# Patient Record
Sex: FEMALE | Race: BLACK OR AFRICAN AMERICAN | NOT HISPANIC OR LATINO | Employment: FULL TIME | URBAN - METROPOLITAN AREA
[De-identification: names, ages, dates, MRNs, and addresses within clinical notes are randomized per-mention and may not be internally consistent; named-entity substitution may affect disease eponyms.]

---

## 2019-08-19 PROBLEM — Z34.90 PREGNANCY: Status: ACTIVE | Noted: 2019-08-19

## 2020-02-13 PROBLEM — N93.9 VAGINAL BLEEDING: Status: ACTIVE | Noted: 2020-02-13

## 2020-03-28 PROBLEM — N85.8 ALTERATION IN COMFORT ASSOCIATED WITH UTERINE CONTRACTIONS: Status: ACTIVE | Noted: 2020-03-28

## 2020-09-03 ENCOUNTER — TELEPHONE (OUTPATIENT)
Dept: PHARMACY | Facility: CLINIC | Age: 30
End: 2020-09-03

## 2020-09-03 NOTE — TELEPHONE ENCOUNTER
LVM for callback to inform patient that [facility] received prescription for Jadenu and benefits investigation is required.  [facility] will be back in touch once insurance determination is received.

## 2020-09-09 NOTE — TELEPHONE ENCOUNTER
DOCUMENTATION ONLY:  Deferasirox 360 mg and 90 mg is covered under the patients medicaid plan without authorization for $0

## 2020-09-09 NOTE — TELEPHONE ENCOUNTER
Call Attempt #1.  M for Jerri initial consult.  Copay $0.00 for both deferasirox 90mg and deferasirox 360mg.

## 2020-09-09 NOTE — TELEPHONE ENCOUNTER
Initial Jadenu consult completed on 2020.  Jadenu will be shipped on 9/10/2020 to arrive at patient's home on 2020 via [employer]. $0.00 copay. Address confirmed. Patient plans to start Jadenu on 2020. Confirmed 2 patient identifiers - name and . Therapy Appropriate.    Patient was counseled on the administration directions:  -Take 1 - 360 mg tablet WITH 1 - 90mg tablet by mouth daily  -Take on an empty stomach or with a light meal  -Store at room temperature, in a cool, dry, place.  Keep out of reach of children/pets.    Patient was counseled on the following possible side effects, which include, but are not limited to:  Diarrhea, upset stomach, stomach pain/cramping, rash.  Patient was given hydrocortisone cream 1% to use for rash relief.  -Contact MD if signs of allergic reaction, infection, kidney/liver problems, bleeding, changes in hearing or eyesight.     DDIs:  Medication list reviewed and potential DDIs addressed.     Patient verbalized understanding. Patient does not have any further questions or concerns at this time.  Patient plans to start Jadenu on 2020. Consultation included: indication; goals of treatment; administration; storage and handling; side effects; how to handle side effects; the importance of compliance; how to handle missed doses; the importance of laboratory monitoring; the importance of keeping all follow up appointments.  Patient understands to report any medication changes to [facility] and provider. All questions answered and addressed to patients satisfaction. I will f/u with her in 1 week from start, [facility] to contact patient in 3 weeks for refills.

## 2020-09-18 NOTE — TELEPHONE ENCOUNTER
Patient just started Jadenu about 3- 4 days ago. Agreeable to completion of 7 day touchbase after being on medication for at least 7 days. Will call patient on 9/22.

## 2020-09-22 ENCOUNTER — TELEPHONE (OUTPATIENT)
Dept: PHARMACY | Facility: CLINIC | Age: 30
End: 2020-09-22

## 2020-09-22 NOTE — TELEPHONE ENCOUNTER
Call Attempt 1: [facility] reach [facility]. LVM requesting call back to discuss how she is doing on her medication.

## 2020-09-29 NOTE — TELEPHONE ENCOUNTER
Start date for Jerri: 9/15/20  Dosing, how taking: Take deferasirox (Jadeni) 1 - 360mg tablet WITH 1 - 90mg tablet once daily at the same time each day. Takes on an empty stomach around noon.   Storage: room temperature  Side effects: Patient reports some nausea. Denies any vomiting. Will send message to MD to get script for anti-nausea medication. Appetite slightly decreased. Still having about 1 meal a day.

## 2020-10-09 ENCOUNTER — TELEPHONE (OUTPATIENT)
Dept: PHARMACY | Facility: CLINIC | Age: 30
End: 2020-10-09

## 2020-10-09 NOTE — TELEPHONE ENCOUNTER
Jadenu refill confirmed. [facility] will ship Jadenu (360mg and 90mg) on 10/13 via [employer] for delivery on 10/14. Copay $0.00. Confirmed 2 patient identifiers - name and .     Patient reports taking Jadenu 360mg and 90mg tablets (total 450mg dose) once daily. She has approximately 1 week of medication remaining. Patient reports that nausea has mostly subsided. She picked up the Zofran from her local [facility] pharmacy. She reports she has not needed the medication, but will keep it on hand if needed. She reports experiencing mild headaches. She is taking Tylenol when needed for relief and resting when appropriate. No missed doses, no new medications, no new allergies or health conditions reported at this time. Allergies reviewed and medication reconciliation complete (reviewed and documented in [facility]). Patient counseled on importance of maintaining adherence and keeping lab appointments which were scheduled. All questions answered and addressed to patients satisfaction. Advised to call [facility] and provider if any issues arise.  Pt verbalized understanding.

## 2020-11-07 ENCOUNTER — PATIENT MESSAGE (OUTPATIENT)
Dept: PHARMACY | Facility: CLINIC | Age: 30
End: 2020-11-07

## 2020-11-09 ENCOUNTER — TELEPHONE (OUTPATIENT)
Dept: PHARMACY | Facility: CLINIC | Age: 30
End: 2020-11-09

## 2020-12-07 ENCOUNTER — PATIENT MESSAGE (OUTPATIENT)
Dept: PHARMACY | Facility: CLINIC | Age: 30
End: 2020-12-07

## 2020-12-09 ENCOUNTER — SPECIALTY PHARMACY (OUTPATIENT)
Dept: PHARMACY | Facility: CLINIC | Age: 30
End: 2020-12-09

## 2020-12-09 DIAGNOSIS — E83.19 IRON OVERLOAD: Primary | ICD-10-CM

## 2020-12-09 NOTE — TELEPHONE ENCOUNTER
"At follow up and refill call today, patient reported that she is having "frequent" headaches due to the Jadenu. The side effect of headaches from the PI has 'frequency not defined.' Rates the pain a 5/10. She does not normally take any medication for her headaches but uses Excedrin as needed and this did help. She stopped taking the Jadenu about 2.5 weeks ago. She does not seem willing to restart due to the headaches. I explained the importance of the medication with regard to her ferritin level. Will message MD to see if there is anything they advise or would prescribe for her headaches for her to continue on therapy.   "

## 2020-12-16 NOTE — TELEPHONE ENCOUNTER
Marivel Pepper RN notified [facility] that patient has missed the last couple of appt scheduled with Dr. Holguin. At last call, patient has stopped her Jadenu and did not seem willing to restart due to side effects of headaches. Asked if we should close out patient's Jadenu in our system at this time or continue to follow up.

## 2020-12-22 NOTE — TELEPHONE ENCOUNTER
Closing out due to MD office not being able to reach patient for follow up and patient self-discontinuing her medications. MD office is aware to reach out if patient decides to restart therapy again in the future.

## 2020-12-22 NOTE — TELEPHONE ENCOUNTER
Provider has still not been able to reach patient for follow up concerning Paulu d/c. Messaged office to see if appropriate for [facility] to close out at this time.

## 2021-01-13 ENCOUNTER — SPECIALTY PHARMACY (OUTPATIENT)
Dept: PHARMACY | Facility: CLINIC | Age: 31
End: 2021-01-13

## 2021-01-13 DIAGNOSIS — E83.19 IRON OVERLOAD: Primary | ICD-10-CM

## 2021-02-17 ENCOUNTER — PATIENT MESSAGE (OUTPATIENT)
Dept: PHARMACY | Facility: CLINIC | Age: 31
End: 2021-02-17

## 2021-02-26 ENCOUNTER — HOSPITAL ENCOUNTER (EMERGENCY)
Facility: HOSPITAL | Age: 31
Discharge: LEFT BEFORE DISCHARGE | End: 2021-02-26
Attending: EMERGENCY MEDICINE
Payer: MEDICAID

## 2021-02-26 VITALS
WEIGHT: 150 LBS | TEMPERATURE: 99 F | OXYGEN SATURATION: 96 % | BODY MASS INDEX: 23.54 KG/M2 | SYSTOLIC BLOOD PRESSURE: 98 MMHG | HEART RATE: 92 BPM | DIASTOLIC BLOOD PRESSURE: 53 MMHG | RESPIRATION RATE: 18 BRPM | HEIGHT: 67 IN

## 2021-02-26 DIAGNOSIS — R50.9 FEVER, UNSPECIFIED FEVER CAUSE: Primary | ICD-10-CM

## 2021-02-26 LAB
ALBUMIN SERPL BCP-MCNC: 4.7 G/DL (ref 3.5–5.2)
ALP SERPL-CCNC: 62 U/L (ref 55–135)
ALT SERPL W/O P-5'-P-CCNC: 32 U/L (ref 10–44)
ANION GAP SERPL CALC-SCNC: 7 MMOL/L (ref 8–16)
ANISOCYTOSIS BLD QL SMEAR: SLIGHT
AST SERPL-CCNC: 42 U/L (ref 10–40)
B-HCG UR QL: NEGATIVE
BACTERIA #/AREA URNS HPF: ABNORMAL /HPF
BASOPHILS # BLD AUTO: 0.02 K/UL (ref 0–0.2)
BASOPHILS NFR BLD: 0.2 % (ref 0–1.9)
BILIRUB SERPL-MCNC: 6.1 MG/DL (ref 0.1–1)
BILIRUB UR QL STRIP: NEGATIVE
BUN SERPL-MCNC: 7 MG/DL (ref 6–20)
CALCIUM SERPL-MCNC: 8.7 MG/DL (ref 8.7–10.5)
CHLORIDE SERPL-SCNC: 108 MMOL/L (ref 95–110)
CLARITY UR: ABNORMAL
CO2 SERPL-SCNC: 25 MMOL/L (ref 23–29)
COLOR UR: ABNORMAL
CREAT SERPL-MCNC: 0.6 MG/DL (ref 0.5–1.4)
CTP QC/QA: YES
CTP QC/QA: YES
DIFFERENTIAL METHOD: ABNORMAL
EOSINOPHIL # BLD AUTO: 0 K/UL (ref 0–0.5)
EOSINOPHIL NFR BLD: 0.1 % (ref 0–8)
ERYTHROCYTE [DISTWIDTH] IN BLOOD BY AUTOMATED COUNT: 18.3 % (ref 11.5–14.5)
EST. GFR  (AFRICAN AMERICAN): >60 ML/MIN/1.73 M^2
EST. GFR  (NON AFRICAN AMERICAN): >60 ML/MIN/1.73 M^2
GLUCOSE SERPL-MCNC: 98 MG/DL (ref 70–110)
GLUCOSE UR QL STRIP: NEGATIVE
HCT VFR BLD AUTO: 24.8 % (ref 37–48.5)
HGB BLD-MCNC: 8.7 G/DL (ref 12–16)
HGB UR QL STRIP: NEGATIVE
HYALINE CASTS #/AREA URNS LPF: 2 /LPF
HYPOCHROMIA BLD QL SMEAR: ABNORMAL
IMM GRANULOCYTES # BLD AUTO: 0.09 K/UL (ref 0–0.04)
IMM GRANULOCYTES NFR BLD AUTO: 0.8 % (ref 0–0.5)
KETONES UR QL STRIP: NEGATIVE
LACTATE SERPL-SCNC: 1.1 MMOL/L (ref 0.5–2.2)
LEUKOCYTE ESTERASE UR QL STRIP: ABNORMAL
LYMPHOCYTES # BLD AUTO: 1.7 K/UL (ref 1–4.8)
LYMPHOCYTES NFR BLD: 14.3 % (ref 18–48)
MCH RBC QN AUTO: 29.8 PG (ref 27–31)
MCHC RBC AUTO-ENTMCNC: 35.1 G/DL (ref 32–36)
MCV RBC AUTO: 85 FL (ref 82–98)
MICROSCOPIC COMMENT: ABNORMAL
MONOCYTES # BLD AUTO: 1.1 K/UL (ref 0.3–1)
MONOCYTES NFR BLD: 9.5 % (ref 4–15)
NEUTROPHILS # BLD AUTO: 8.8 K/UL (ref 1.8–7.7)
NEUTROPHILS NFR BLD: 75.1 % (ref 38–73)
NITRITE UR QL STRIP: NEGATIVE
NRBC BLD-RTO: 1 /100 WBC
PH UR STRIP: 6 [PH] (ref 5–8)
PLATELET # BLD AUTO: 542 K/UL (ref 150–350)
PMV BLD AUTO: 9.8 FL (ref 9.2–12.9)
POC MOLECULAR INFLUENZA A AGN: NEGATIVE
POC MOLECULAR INFLUENZA B AGN: NEGATIVE
POIKILOCYTOSIS BLD QL SMEAR: SLIGHT
POLYCHROMASIA BLD QL SMEAR: ABNORMAL
POTASSIUM SERPL-SCNC: 3.2 MMOL/L (ref 3.5–5.1)
PROT SERPL-MCNC: 8.9 G/DL (ref 6–8.4)
PROT UR QL STRIP: ABNORMAL
RBC # BLD AUTO: 2.92 M/UL (ref 4–5.4)
RBC #/AREA URNS HPF: 1 /HPF (ref 0–4)
RETICS/RBC NFR AUTO: 16.8 % (ref 0.5–2.5)
SARS-COV-2 RDRP RESP QL NAA+PROBE: NEGATIVE
SICKLE CELLS BLD QL SMEAR: ABNORMAL
SODIUM SERPL-SCNC: 140 MMOL/L (ref 136–145)
SP GR UR STRIP: 1.01 (ref 1–1.03)
SQUAMOUS #/AREA URNS HPF: 14 /HPF
TARGETS BLD QL SMEAR: ABNORMAL
URN SPEC COLLECT METH UR: ABNORMAL
UROBILINOGEN UR STRIP-ACNC: 1 EU/DL
WBC # BLD AUTO: 11.7 K/UL (ref 3.9–12.7)
WBC #/AREA URNS HPF: 6 /HPF (ref 0–5)

## 2021-02-26 PROCEDURE — 85025 COMPLETE CBC W/AUTO DIFF WBC: CPT

## 2021-02-26 PROCEDURE — 81000 URINALYSIS NONAUTO W/SCOPE: CPT

## 2021-02-26 PROCEDURE — 25000003 PHARM REV CODE 250: Performed by: EMERGENCY MEDICINE

## 2021-02-26 PROCEDURE — 87040 BLOOD CULTURE FOR BACTERIA: CPT

## 2021-02-26 PROCEDURE — 63600175 PHARM REV CODE 636 W HCPCS: Performed by: EMERGENCY MEDICINE

## 2021-02-26 PROCEDURE — 81025 URINE PREGNANCY TEST: CPT

## 2021-02-26 PROCEDURE — 99284 EMERGENCY DEPT VISIT MOD MDM: CPT | Mod: 25

## 2021-02-26 PROCEDURE — 85045 AUTOMATED RETICULOCYTE COUNT: CPT

## 2021-02-26 PROCEDURE — U0002 COVID-19 LAB TEST NON-CDC: HCPCS | Performed by: EMERGENCY MEDICINE

## 2021-02-26 PROCEDURE — 36415 COLL VENOUS BLD VENIPUNCTURE: CPT

## 2021-02-26 PROCEDURE — 96365 THER/PROPH/DIAG IV INF INIT: CPT

## 2021-02-26 PROCEDURE — 80053 COMPREHEN METABOLIC PANEL: CPT

## 2021-02-26 PROCEDURE — 96372 THER/PROPH/DIAG INJ SC/IM: CPT | Mod: 59

## 2021-02-26 PROCEDURE — 83605 ASSAY OF LACTIC ACID: CPT

## 2021-02-26 RX ORDER — DEXAMETHASONE SODIUM PHOSPHATE 4 MG/ML
8 INJECTION, SOLUTION INTRA-ARTICULAR; INTRALESIONAL; INTRAMUSCULAR; INTRAVENOUS; SOFT TISSUE
Status: COMPLETED | OUTPATIENT
Start: 2021-02-26 | End: 2021-02-26

## 2021-02-26 RX ORDER — ACETAMINOPHEN 500 MG
1000 TABLET ORAL
Status: COMPLETED | OUTPATIENT
Start: 2021-02-26 | End: 2021-02-26

## 2021-02-26 RX ADMIN — ACETAMINOPHEN 1000 MG: 500 TABLET ORAL at 03:02

## 2021-02-26 RX ADMIN — DEXAMETHASONE SODIUM PHOSPHATE 8 MG: 4 INJECTION, SOLUTION INTRA-ARTICULAR; INTRALESIONAL; INTRAMUSCULAR; INTRAVENOUS; SOFT TISSUE at 03:02

## 2021-02-26 RX ADMIN — CEFTRIAXONE SODIUM 2 G: 2 INJECTION, POWDER, FOR SOLUTION INTRAMUSCULAR; INTRAVENOUS at 03:02

## 2021-03-04 LAB
BACTERIA BLD CULT: NORMAL
BACTERIA BLD CULT: NORMAL

## 2021-03-05 ENCOUNTER — SPECIALTY PHARMACY (OUTPATIENT)
Dept: PHARMACY | Facility: CLINIC | Age: 31
End: 2021-03-05

## 2021-03-05 DIAGNOSIS — E83.19 IRON OVERLOAD: Primary | ICD-10-CM

## 2021-03-29 ENCOUNTER — PATIENT MESSAGE (OUTPATIENT)
Dept: PHARMACY | Facility: CLINIC | Age: 31
End: 2021-03-29

## 2021-04-07 ENCOUNTER — SPECIALTY PHARMACY (OUTPATIENT)
Dept: PHARMACY | Facility: CLINIC | Age: 31
End: 2021-04-07

## 2021-04-07 DIAGNOSIS — E83.19 IRON OVERLOAD: Primary | ICD-10-CM

## 2021-04-15 ENCOUNTER — SPECIALTY PHARMACY (OUTPATIENT)
Dept: PHARMACY | Facility: CLINIC | Age: 31
End: 2021-04-15

## 2021-05-11 ENCOUNTER — SPECIALTY PHARMACY (OUTPATIENT)
Dept: PHARMACY | Facility: CLINIC | Age: 31
End: 2021-05-11

## 2021-05-11 DIAGNOSIS — E83.19 IRON OVERLOAD: Primary | ICD-10-CM

## 2021-06-08 ENCOUNTER — PATIENT MESSAGE (OUTPATIENT)
Dept: PHARMACY | Facility: CLINIC | Age: 31
End: 2021-06-08

## 2021-06-11 ENCOUNTER — SPECIALTY PHARMACY (OUTPATIENT)
Dept: PHARMACY | Facility: CLINIC | Age: 31
End: 2021-06-11

## 2021-06-29 ENCOUNTER — SPECIALTY PHARMACY (OUTPATIENT)
Dept: PHARMACY | Facility: CLINIC | Age: 31
End: 2021-06-29

## 2021-06-29 DIAGNOSIS — E83.19 IRON OVERLOAD: Primary | ICD-10-CM

## 2021-07-09 ENCOUNTER — SPECIALTY PHARMACY (OUTPATIENT)
Dept: PHARMACY | Facility: CLINIC | Age: 31
End: 2021-07-09

## 2021-08-04 ENCOUNTER — HOSPITAL ENCOUNTER (EMERGENCY)
Facility: HOSPITAL | Age: 31
Discharge: HOME | End: 2021-08-04
Attending: EMERGENCY MEDICINE
Payer: MEDICAID

## 2021-08-04 VITALS
HEIGHT: 67 IN | RESPIRATION RATE: 18 BRPM | WEIGHT: 161.81 LBS | SYSTOLIC BLOOD PRESSURE: 108 MMHG | TEMPERATURE: 99 F | BODY MASS INDEX: 25.4 KG/M2 | HEART RATE: 83 BPM | DIASTOLIC BLOOD PRESSURE: 68 MMHG | OXYGEN SATURATION: 96 %

## 2021-08-04 DIAGNOSIS — B34.9 VIRAL SYNDROME: Primary | ICD-10-CM

## 2021-08-04 LAB
ANION GAP SERPL CALC-SCNC: 6 MMOL/L (ref 8–16)
B-HCG UR QL: NEGATIVE
BACTERIA #/AREA URNS HPF: NEGATIVE /HPF
BASOPHILS # BLD AUTO: 0.06 K/UL (ref 0–0.2)
BASOPHILS NFR BLD: 0.6 % (ref 0–1.9)
BILIRUB UR QL STRIP: NEGATIVE
BUN SERPL-MCNC: 7 MG/DL (ref 6–20)
CALCIUM SERPL-MCNC: 8.6 MG/DL (ref 8.7–10.5)
CHLORIDE SERPL-SCNC: 113 MMOL/L (ref 95–110)
CLARITY UR: CLEAR
CO2 SERPL-SCNC: 25 MMOL/L (ref 23–29)
COLOR UR: YELLOW
CREAT SERPL-MCNC: 0.6 MG/DL (ref 0.5–1.4)
CTP QC/QA: YES
DIFFERENTIAL METHOD: ABNORMAL
EOSINOPHIL # BLD AUTO: 0.5 K/UL (ref 0–0.5)
EOSINOPHIL NFR BLD: 4.7 % (ref 0–8)
ERYTHROCYTE [DISTWIDTH] IN BLOOD BY AUTOMATED COUNT: 17.3 % (ref 11.5–14.5)
EST. GFR  (AFRICAN AMERICAN): >60 ML/MIN/1.73 M^2
EST. GFR  (NON AFRICAN AMERICAN): >60 ML/MIN/1.73 M^2
GLUCOSE SERPL-MCNC: 99 MG/DL (ref 70–110)
GLUCOSE UR QL STRIP: NEGATIVE
HCT VFR BLD AUTO: 21.7 % (ref 37–48.5)
HGB BLD-MCNC: 7.9 G/DL (ref 12–16)
HGB UR QL STRIP: NEGATIVE
HYALINE CASTS #/AREA URNS LPF: 2 /LPF
IMM GRANULOCYTES # BLD AUTO: 0.04 K/UL (ref 0–0.04)
IMM GRANULOCYTES NFR BLD AUTO: 0.4 % (ref 0–0.5)
KETONES UR QL STRIP: NEGATIVE
LEUKOCYTE ESTERASE UR QL STRIP: ABNORMAL
LYMPHOCYTES # BLD AUTO: 3.7 K/UL (ref 1–4.8)
LYMPHOCYTES NFR BLD: 35.7 % (ref 18–48)
MCH RBC QN AUTO: 29.8 PG (ref 27–31)
MCHC RBC AUTO-ENTMCNC: 36.4 G/DL (ref 32–36)
MCV RBC AUTO: 82 FL (ref 82–98)
MICROSCOPIC COMMENT: ABNORMAL
MONOCYTES # BLD AUTO: 1.1 K/UL (ref 0.3–1)
MONOCYTES NFR BLD: 10.3 % (ref 4–15)
NEUTROPHILS # BLD AUTO: 5 K/UL (ref 1.8–7.7)
NEUTROPHILS NFR BLD: 48.3 % (ref 38–73)
NITRITE UR QL STRIP: NEGATIVE
NRBC BLD-RTO: 1 /100 WBC
PH UR STRIP: 7 [PH] (ref 5–8)
PLATELET # BLD AUTO: 419 K/UL (ref 150–450)
PMV BLD AUTO: 9.7 FL (ref 9.2–12.9)
POTASSIUM SERPL-SCNC: 3.9 MMOL/L (ref 3.5–5.1)
PROT UR QL STRIP: NEGATIVE
RBC # BLD AUTO: 2.65 M/UL (ref 4–5.4)
RBC #/AREA URNS HPF: 2 /HPF (ref 0–4)
SARS-COV-2 RDRP RESP QL NAA+PROBE: NEGATIVE
SODIUM SERPL-SCNC: 144 MMOL/L (ref 136–145)
SP GR UR STRIP: 1.01 (ref 1–1.03)
SQUAMOUS #/AREA URNS HPF: 3 /HPF
URN SPEC COLLECT METH UR: ABNORMAL
UROBILINOGEN UR STRIP-ACNC: 1 EU/DL
WBC # BLD AUTO: 10.38 K/UL (ref 3.9–12.7)
WBC #/AREA URNS HPF: 2 /HPF (ref 0–5)

## 2021-08-04 PROCEDURE — 36415 COLL VENOUS BLD VENIPUNCTURE: CPT | Performed by: EMERGENCY MEDICINE

## 2021-08-04 PROCEDURE — 85025 COMPLETE CBC W/AUTO DIFF WBC: CPT | Performed by: EMERGENCY MEDICINE

## 2021-08-04 PROCEDURE — 81025 URINE PREGNANCY TEST: CPT | Performed by: EMERGENCY MEDICINE

## 2021-08-04 PROCEDURE — U0002 COVID-19 LAB TEST NON-CDC: HCPCS | Performed by: EMERGENCY MEDICINE

## 2021-08-04 PROCEDURE — 99283 EMERGENCY DEPT VISIT LOW MDM: CPT

## 2021-08-04 PROCEDURE — 81000 URINALYSIS NONAUTO W/SCOPE: CPT | Performed by: EMERGENCY MEDICINE

## 2021-08-04 PROCEDURE — 80048 BASIC METABOLIC PNL TOTAL CA: CPT | Performed by: EMERGENCY MEDICINE

## 2021-08-13 ENCOUNTER — SPECIALTY PHARMACY (OUTPATIENT)
Dept: PHARMACY | Facility: CLINIC | Age: 31
End: 2021-08-13

## 2021-08-17 ENCOUNTER — SPECIALTY PHARMACY (OUTPATIENT)
Dept: PHARMACY | Facility: CLINIC | Age: 31
End: 2021-08-17

## 2021-08-17 ENCOUNTER — PATIENT MESSAGE (OUTPATIENT)
Dept: PHARMACY | Facility: CLINIC | Age: 31
End: 2021-08-17

## 2021-09-14 ENCOUNTER — SPECIALTY PHARMACY (OUTPATIENT)
Dept: PHARMACY | Facility: CLINIC | Age: 31
End: 2021-09-14

## 2021-09-22 ENCOUNTER — SPECIALTY PHARMACY (OUTPATIENT)
Dept: PHARMACY | Facility: CLINIC | Age: 31
End: 2021-09-22

## 2021-09-22 DIAGNOSIS — E83.19 IRON OVERLOAD: Primary | ICD-10-CM

## 2021-10-03 ENCOUNTER — HOSPITAL ENCOUNTER (EMERGENCY)
Facility: HOSPITAL | Age: 31
Discharge: HOME | End: 2021-10-03
Attending: EMERGENCY MEDICINE
Payer: MEDICAID

## 2021-10-03 VITALS
BODY MASS INDEX: 25.22 KG/M2 | SYSTOLIC BLOOD PRESSURE: 110 MMHG | TEMPERATURE: 98 F | OXYGEN SATURATION: 97 % | DIASTOLIC BLOOD PRESSURE: 57 MMHG | WEIGHT: 161 LBS | RESPIRATION RATE: 18 BRPM | HEART RATE: 88 BPM

## 2021-10-03 DIAGNOSIS — B34.9 VIRAL SYNDROME: ICD-10-CM

## 2021-10-03 DIAGNOSIS — D57.00 SICKLE CELL DISEASE WITH CRISIS: Primary | ICD-10-CM

## 2021-10-03 LAB
ALBUMIN SERPL BCP-MCNC: 4.2 G/DL (ref 3.5–5.2)
ALP SERPL-CCNC: 79 U/L (ref 55–135)
ALT SERPL W/O P-5'-P-CCNC: 85 U/L (ref 10–44)
ANION GAP SERPL CALC-SCNC: 4 MMOL/L (ref 8–16)
AST SERPL-CCNC: 93 U/L (ref 10–40)
BASOPHILS # BLD AUTO: 0.03 K/UL (ref 0–0.2)
BASOPHILS NFR BLD: 0.2 % (ref 0–1.9)
BILIRUB SERPL-MCNC: 4.7 MG/DL (ref 0.1–1)
BUN SERPL-MCNC: 5 MG/DL (ref 6–20)
CALCIUM SERPL-MCNC: 8.6 MG/DL (ref 8.7–10.5)
CHLORIDE SERPL-SCNC: 108 MMOL/L (ref 95–110)
CO2 SERPL-SCNC: 25 MMOL/L (ref 23–29)
CREAT SERPL-MCNC: 0.5 MG/DL (ref 0.5–1.4)
CTP QC/QA: YES
CTP QC/QA: YES
DIFFERENTIAL METHOD: ABNORMAL
EOSINOPHIL # BLD AUTO: 0.1 K/UL (ref 0–0.5)
EOSINOPHIL NFR BLD: 0.7 % (ref 0–8)
ERYTHROCYTE [DISTWIDTH] IN BLOOD BY AUTOMATED COUNT: 17.3 % (ref 11.5–14.5)
EST. GFR  (AFRICAN AMERICAN): >60 ML/MIN/1.73 M^2
EST. GFR  (NON AFRICAN AMERICAN): >60 ML/MIN/1.73 M^2
GLUCOSE SERPL-MCNC: 90 MG/DL (ref 70–110)
HCT VFR BLD AUTO: 23 % (ref 37–48.5)
HGB BLD-MCNC: 8.2 G/DL (ref 12–16)
IMM GRANULOCYTES # BLD AUTO: 0.1 K/UL (ref 0–0.04)
IMM GRANULOCYTES NFR BLD AUTO: 0.7 % (ref 0–0.5)
LYMPHOCYTES # BLD AUTO: 2.1 K/UL (ref 1–4.8)
LYMPHOCYTES NFR BLD: 14.3 % (ref 18–48)
MCH RBC QN AUTO: 29.4 PG (ref 27–31)
MCHC RBC AUTO-ENTMCNC: 35.7 G/DL (ref 32–36)
MCV RBC AUTO: 82 FL (ref 82–98)
MONOCYTES # BLD AUTO: 1.2 K/UL (ref 0.3–1)
MONOCYTES NFR BLD: 8.4 % (ref 4–15)
NEUTROPHILS # BLD AUTO: 11 K/UL (ref 1.8–7.7)
NEUTROPHILS NFR BLD: 75.7 % (ref 38–73)
NRBC BLD-RTO: 1 /100 WBC
PLATELET # BLD AUTO: 404 K/UL (ref 150–450)
PMV BLD AUTO: 9.8 FL (ref 9.2–12.9)
POC MOLECULAR INFLUENZA A AGN: NEGATIVE
POC MOLECULAR INFLUENZA B AGN: NEGATIVE
POTASSIUM SERPL-SCNC: 3.6 MMOL/L (ref 3.5–5.1)
PROT SERPL-MCNC: 8.2 G/DL (ref 6–8.4)
RBC # BLD AUTO: 2.79 M/UL (ref 4–5.4)
RETICS/RBC NFR AUTO: 16.2 % (ref 0.5–2.5)
SARS-COV-2 RDRP RESP QL NAA+PROBE: NEGATIVE
SODIUM SERPL-SCNC: 137 MMOL/L (ref 136–145)
WBC # BLD AUTO: 14.54 K/UL (ref 3.9–12.7)

## 2021-10-03 PROCEDURE — 85045 AUTOMATED RETICULOCYTE COUNT: CPT | Performed by: NURSE PRACTITIONER

## 2021-10-03 PROCEDURE — U0002 COVID-19 LAB TEST NON-CDC: HCPCS | Performed by: NURSE PRACTITIONER

## 2021-10-03 PROCEDURE — 25000003 PHARM REV CODE 250: Performed by: NURSE PRACTITIONER

## 2021-10-03 PROCEDURE — 80053 COMPREHEN METABOLIC PANEL: CPT | Performed by: NURSE PRACTITIONER

## 2021-10-03 PROCEDURE — 36415 COLL VENOUS BLD VENIPUNCTURE: CPT | Performed by: NURSE PRACTITIONER

## 2021-10-03 PROCEDURE — 99284 EMERGENCY DEPT VISIT MOD MDM: CPT | Mod: 25

## 2021-10-03 PROCEDURE — 85025 COMPLETE CBC W/AUTO DIFF WBC: CPT | Performed by: NURSE PRACTITIONER

## 2021-10-03 RX ORDER — ACETAMINOPHEN 325 MG/1
650 TABLET ORAL
Status: COMPLETED | OUTPATIENT
Start: 2021-10-03 | End: 2021-10-03

## 2021-10-03 RX ORDER — ONDANSETRON 4 MG/1
4 TABLET, ORALLY DISINTEGRATING ORAL EVERY 8 HOURS PRN
Qty: 9 TABLET | Refills: 0 | Status: SHIPPED | OUTPATIENT
Start: 2021-10-03 | End: 2021-10-06

## 2021-10-03 RX ORDER — HYDROCODONE BITARTRATE AND ACETAMINOPHEN 5; 325 MG/1; MG/1
1 TABLET ORAL EVERY 8 HOURS PRN
Qty: 15 TABLET | Refills: 0 | Status: SHIPPED | OUTPATIENT
Start: 2021-10-03 | End: 2021-10-08

## 2021-10-03 RX ADMIN — ACETAMINOPHEN 650 MG: 325 TABLET ORAL at 01:10

## 2021-10-03 RX ADMIN — SODIUM CHLORIDE 1000 ML: 900 INJECTION, SOLUTION INTRAVENOUS at 12:10

## 2021-10-15 PROBLEM — Z34.90 PREGNANCY: Status: RESOLVED | Noted: 2019-08-19 | Resolved: 2021-10-15

## 2021-10-19 ENCOUNTER — SPECIALTY PHARMACY (OUTPATIENT)
Dept: PHARMACY | Facility: CLINIC | Age: 31
End: 2021-10-19

## 2021-10-22 ENCOUNTER — SPECIALTY PHARMACY (OUTPATIENT)
Dept: PHARMACY | Facility: CLINIC | Age: 31
End: 2021-10-22

## 2021-10-30 ENCOUNTER — SPECIALTY PHARMACY (OUTPATIENT)
Dept: PHARMACY | Facility: CLINIC | Age: 31
End: 2021-10-30
Payer: MEDICAID

## 2021-12-03 ENCOUNTER — SPECIALTY PHARMACY (OUTPATIENT)
Dept: PHARMACY | Facility: CLINIC | Age: 31
End: 2021-12-03
Payer: MEDICAID

## 2022-01-04 ENCOUNTER — SPECIALTY PHARMACY (OUTPATIENT)
Dept: PHARMACY | Facility: CLINIC | Age: 32
End: 2022-01-04
Payer: MEDICAID

## 2022-01-04 DIAGNOSIS — E83.19 IRON OVERLOAD: Primary | ICD-10-CM

## 2022-01-04 NOTE — TELEPHONE ENCOUNTER
Specialty Pharmacy - Medication/Referral Authorization  Specialty Pharmacy - Refill Coordination  Specialty Pharmacy - Clinical Reassessment    Specialty Medication Orders Linked to Encounter    Flowsheet Row Most Recent Value   Medication #1 deferasirox (JADENU) 90 mg Tab (Order#[id number], Rx#[id number])   Medication #2 deferasirox (JADENU) 360 mg Tab (Order#[id number], Rx#[id number])        Jada Duarte is a 31 y.o. female, who is followed by the specialty pharmacy service for management and education.    Recent Encounters     Date Type Provider Description    01/04/2022 Specialty Pharmacy Anuja Martínez, Héctor Referral Authorization; Refill Coordination; Follow-up Clinical Reassessment    12/03/2021 Specialty Pharmacy Judy Ayla Refill Coordination    12/03/2021 Specialty Pharmacy DAGO FELTON PharmD Referral Authorization    10/30/2021 Specialty Pharmacy Marivel Gaston Refill Coordination    10/22/2021 Specialty Pharmacy Judy Ayla Refill Coordination        Clinical call attempts since last clinical assessment   No call attempts found.     Today she received follow up education for her specialty medication(s).    Current Outpatient Medications   Medication Sig    deferasirox (JADENU) 360 mg Tab Take 1 tablet (360 mg total) by mouth every other day. Take with 90mg tablet for total dose of 450mg    deferasirox (JADENU) 90 mg Tab Take 1 tablet (90 mg) by mouth every other day. Take with 360mg tablet for total dose of 450mg    folic acid (FOLVITE) 1 MG tablet Take one pill by mouth daily.   Last reviewed on 1/4/2022  1:45 PM by Anuja Martínez, Héctor    Review of patient's allergies indicates:  No Known AllergiesLast reviewed on  1/4/2022 1:45 PM by Anuja Martínez    Drug Interactions    Drug interactions evaluated: yes  Clinically relevant drug interactions identified: no  Provided the patient with educational material regarding drug interactions: not applicable       Medication  Adherence    Patient reported X missed doses in the last month: 2  Any gaps in refill history greater than 2 weeks in the last 3 months: no  Demonstrates understanding of importance of adherence: yes  Informant: patient  Reliability of informant: reliable  Provider-estimated medication adherence level: good  Reasons for non-adherence: no problems identified  Adherence tools used: directed education  Support network for adherence: family member, healthcare provider  Confirmed plan for next specialty medication refill: delivery by pharmacy  Refills needed for supportive medications: not needed       Adverse Effects    Activity change: Pos  Fatigue: Pos  Headaches: Pos  Skin: System reviewed and is negative  Eyes: System reviewed and is negative  Endocrine and Metabolic: System reviewed and is negative  Gastrointestinal: System reviewed and is negative  Genitourinary: System reviewed and is negative  Cardiovascular: System reviewed and is negative  Hematologic: System reviewed and is negative  Musculoskeletal: System reviewed and is negative  HENT: System reviewed and is negative  Psychiatric: System reviewed and is negative  Respiratory: System reviewed and is negative       Assessment Questions - Documented Responses    Flowsheet Row Most Recent Value   Assessment    Medication Reconciliation completed for patient Yes   During the past 4 weeks, has patient missed any activities due to condition or medication? No   During the past 4 weeks, did patient have any of the following urgent care visits? ER Admission   Goals of Therapy Status Achieving   Welcome packet contents reviewed and discussed with patient? No   Assesment completed? Yes   Plan Therapy continued   Do you need to open a clinical intervention (i-vent)? No   Do you want to schedule first shipment? No   Medication #1 Assessment Info    Patient status Existing medication, Exisiting to [facility]   Is this medication appropriate for the patient? Yes   Is this  medication effective? Yes   Medication #2 Assessment Info    Patient status Existing medication, Exisiting to [facility]   Is this medication appropriate for the patient? Yes   Is this medication effective? Yes        Refill Questions - Documented Responses    Flowsheet Row Most Recent Value   Patient Availability and HIPAA Verification    Does patient want to proceed with activity? Yes   HIPAA/medical authority confirmed? Yes   Relationship to patient of person spoken to? Self   Refill Screening Questions    Changes to allergies? No   Changes to medications? No   New conditions since last clinic visit? No   Unplanned office visit, urgent care, ED, or hospital admission in the last 4 weeks? Yes  [for weakness]   How does patient/caregiver feel medication is working? Very good   Financial problems or insurance changes? No   How many doses of your specialty medications were missed in the last 4 weeks? 2  [takes every other. Feels like she missed 2 days.]   Why were doses missed? Simply forgot   Would patient like to speak to a pharmacist? Yes   When does the patient need to receive the medication? 01/11/22   Refill Delivery Questions    How will the patient receive the medication? Delivery [facility]   When does the patient need to receive the medication? 01/11/22   Shipping Address Home   Address in [facility] confirmed and updated if neccessary? Yes   Expected Copay ($) 0   Is the patient able to afford the medication copay? Yes   Payment Method zero copay   Days supply of Refill 30   Supplies needed? No supplies needed   Refill activity completed? Yes   Refill activity plan Refill scheduled   Shipment/Pickup Date: 01/11/22          Objective    She has a past medical history of Anemia, Encounter for blood transfusion, Pneumonia, Pregnancy, Sickle cell anemia, Sickle cell disease (5/27/2015), and UTI (urinary tract infection).    Tried/failed medications: none    BP Readings from Last 4 Encounters:   12/26/21 119/64  "  12/01/21 (!) 105/54   10/15/21 100/70   10/03/21 (!) 110/57     Ht Readings from Last 4 Encounters:   12/26/21 5' 8" (1.727 m)   12/01/21 5' 8" (1.727 m)   10/15/21 5' 7" (1.702 m)   08/04/21 5' 7" (1.702 m)     Wt Readings from Last 4 Encounters:   12/26/21 72.6 kg (160 lb)   12/01/21 73.2 kg (161 lb 6 oz)   10/15/21 72.3 kg (159 lb 6.4 oz)   10/03/21 73 kg (161 lb)     Recent Labs   Lab Result Units 12/26/21  2136 12/01/21  1208   Creatinine mg/dL 0.7 0.6   ALT U/L 18 16   AST U/L 33 33   Hemoglobin g/dL 7.5 L 7.5 L     The goals of prescribed drug therapy management include:  · Supporting patient to meet the prescriber's medical treatment objectives  · Improving or maintaining quality of life  · Maintaining optimal therapy adherence  · Minimizing and managing side effects      Goals of Therapy Status: Achieving    Assessment/Plan  Patient plans to continue therapy without changes      Indication, dosage, appropriateness, effectiveness, safety and convenience of her specialty medication(s) were reviewed today.     Patient Counseling    Counseled the patient on the following: doses and administration discussed, safe handling, storage, and disposal discussed, possible adverse effects and management discussed, possible drug and prescription drug interactions discussed, possible drug and OTC drug and food interactions discussed, lab monitoring and follow-up discussed, therapeutic rationale discussed, cost of medications and cost implications discussed, adherence and missed doses discussed, pharmacy contact information discussed       Dosing, how taking Jadenu: she confirmed that she is still taking 1-360mg and 1-90mg tablet by mouth every other day. She did note missing about 2 days of her Jadenu. In the past she has mentioned missing doses but then taking her dose daily to help. Provider has been informed of missed doses in the past. Her ferritin level is stable and has improved since starting Jadenu and therefore " dosage and therapy are still appropriate. Encouraged her to continue to take her Jadenu as prescribed.   Storage: stores medication at room temperature  Side effects: she did note that her headaches have returned. This was an issue in the past; however, pt has been doing well on Jadenu without issues of headache for some time. Uncertain if coming from the Jadenu. She denies using any OTC options. She was prescribed migraine medication, topiramate, for headaches in the past. She confirmed that she has not used the topiramate. Explained that if her headaches persist, we would reach out to provider to get new script for topiramate if needed. Pt confirmed understanding. Denies reviewing over Jadenu medication again (side effects and warnings/precautions).   Recent infections: Denies any fever, chills, SOB  Pain: denies any pain   Energy, fatigue: energy level is low  ED/UC visits: yes. Went to ER on 12/26 due to weakness. She was tested and confirmed negative for COVID and flu and had no acute anemia  Medication list reviewed. No new allergies or health conditions.     Labs reviewed from: 12/26/21  CBC and CMP reviewed. Went to ER on 12/26 due to weakness. She was tested and confirmed negative for COVID and flu and had no acute anemia. tBili is elevated at 4.5 but is stable.   Ferritin from 12/1: 626        Tasks added this encounter   2/3/2022 - Refill Call (Auto Added)  3/28/2022 - Clinical - Follow Up Assesement (90 day)   Tasks due within next 3 months   No tasks due.     Anuja Martínez, PharmD  [facility]  [address] [place], [place] [address]  [facility] [address]  Phone: [phone]  Fax: [phone]

## 2022-01-14 ENCOUNTER — PATIENT OUTREACH (OUTPATIENT)
Dept: ADMINISTRATIVE | Facility: HOSPITAL | Age: 32
End: 2022-01-14
Payer: MEDICAID

## 2022-01-24 ENCOUNTER — PATIENT MESSAGE (OUTPATIENT)
Dept: ADMINISTRATIVE | Facility: HOSPITAL | Age: 32
End: 2022-01-24
Payer: MEDICAID

## 2022-01-26 ENCOUNTER — HOSPITAL ENCOUNTER (EMERGENCY)
Facility: HOSPITAL | Age: 32
Discharge: HOME | End: 2022-01-26
Attending: EMERGENCY MEDICINE
Payer: MEDICAID

## 2022-01-26 VITALS
HEART RATE: 90 BPM | HEIGHT: 68 IN | DIASTOLIC BLOOD PRESSURE: 69 MMHG | SYSTOLIC BLOOD PRESSURE: 107 MMHG | TEMPERATURE: 99 F | RESPIRATION RATE: 16 BRPM | BODY MASS INDEX: 24.25 KG/M2 | WEIGHT: 160 LBS | OXYGEN SATURATION: 95 %

## 2022-01-26 DIAGNOSIS — J02.9 SORE THROAT: ICD-10-CM

## 2022-01-26 DIAGNOSIS — B34.9 VIRAL SYNDROME: Primary | ICD-10-CM

## 2022-01-26 LAB — SARS-COV-2 RNA RESP QL NAA+PROBE: NOT DETECTED

## 2022-01-26 PROCEDURE — 99283 EMERGENCY DEPT VISIT LOW MDM: CPT

## 2022-01-26 PROCEDURE — U0002 COVID-19 LAB TEST NON-CDC: HCPCS | Performed by: EMERGENCY MEDICINE

## 2022-01-26 RX ORDER — PREDNISONE 20 MG/1
40 TABLET ORAL DAILY
Qty: 10 TABLET | Refills: 0 | Status: SHIPPED | OUTPATIENT
Start: 2022-01-26 | End: 2022-01-31

## 2022-01-26 NOTE — ED PROVIDER NOTES
Encounter Date: 2022       History     Chief Complaint   Patient presents with    Sore Throat     Sore throat x 2days      30 yo female with history of sickle cell disease here with sore throat, congestion and cough x 2 days. No fever. No SOB. No known sick contacts. Gradual onset. Minimally improved with cough drops. No aggravating factors. Similar to previous.         Review of patient's allergies indicates:  No Known Allergies  Past Medical History:   Diagnosis Date    Anemia     Encounter for blood transfusion     Pneumonia     Pregnancy         Sickle cell anemia     Sickle cell disease 2015    UTI (urinary tract infection)      Past Surgical History:   Procedure Laterality Date    CHOLECYSTECTOMY       Family History   Problem Relation Age of Onset    No Known Problems Mother     No Known Problems Father      Social History     Tobacco Use    Smoking status: Never Smoker    Smokeless tobacco: Never Used   Substance Use Topics    Alcohol use: No     Alcohol/week: 0.0 standard drinks     Comment: occ    Drug use: No     Review of Systems   Constitutional: Positive for fatigue. Negative for fever.   HENT: Positive for congestion and sore throat.    Respiratory: Positive for cough. Negative for shortness of breath.    Cardiovascular: Negative.    Gastrointestinal: Negative.    All other systems reviewed and are negative.      Physical Exam     Initial Vitals [22 0548]   BP Pulse Resp Temp SpO2   107/69 90 16 98.8 °F (37.1 °C) 95 %      MAP       --         Physical Exam    Nursing note and vitals reviewed.  Constitutional: She appears well-developed and well-nourished. She is not diaphoretic. No distress.   HENT:   Head: Normocephalic and atraumatic.   Eyes: Conjunctivae and EOM are normal. Pupils are equal, round, and reactive to light. No scleral icterus.   Neck: Neck supple.   Normal range of motion.  Cardiovascular: Normal rate, regular rhythm and intact distal pulses.    Pulmonary/Chest: Breath sounds normal. No respiratory distress. She has no wheezes. She has no rales.   Abdominal: Abdomen is soft. Bowel sounds are normal. She exhibits no distension. There is no abdominal tenderness. There is no rebound.   Musculoskeletal:         General: No tenderness or edema. Normal range of motion.      Cervical back: Normal range of motion and neck supple.     Neurological: She is alert and oriented to person, place, and time. She has normal strength and normal reflexes.   Skin: Skin is warm and dry. Capillary refill takes less than 2 seconds. No rash noted.         ED Course   Procedures  Labs Reviewed   SARS-COV-2 (COVID-19) QUALITATIVE PCR          Imaging Results    None          Medications - No data to display  Medical Decision Making:   Clinical Tests:   Lab Tests: Ordered                      Clinical Impression:   Final diagnoses:  [B34.9] Viral syndrome (Primary)  [J02.9] Sore throat          ED Disposition Condition    Discharge Stable        ED Prescriptions     Medication Sig Dispense Start Date End Date Auth. Provider    predniSONE (DELTASONE) 20 MG tablet Take 2 tablets (40 mg total) by mouth once daily. for 5 days 10 tablet 1/26/2022 1/31/2022 Tae Phelan MD        Follow-up Information     Follow up With Specialties Details Why Contact Info    Cal Clay MD Family Medicine Schedule an appointment as soon as possible for a visit   [address] [place] [address]  [place] [address]  [phone]             Tae Phelan MD  01/26/22 0626

## 2022-01-30 ENCOUNTER — HOSPITAL ENCOUNTER (EMERGENCY)
Facility: HOSPITAL | Age: 32
Discharge: HOME | End: 2022-01-30
Attending: STUDENT IN AN ORGANIZED HEALTH CARE EDUCATION/TRAINING PROGRAM
Payer: MEDICAID

## 2022-01-30 VITALS
BODY MASS INDEX: 23.64 KG/M2 | DIASTOLIC BLOOD PRESSURE: 74 MMHG | HEART RATE: 92 BPM | WEIGHT: 156 LBS | TEMPERATURE: 99 F | OXYGEN SATURATION: 96 % | RESPIRATION RATE: 18 BRPM | SYSTOLIC BLOOD PRESSURE: 123 MMHG | HEIGHT: 68 IN

## 2022-01-30 DIAGNOSIS — J02.9 PHARYNGITIS, UNSPECIFIED ETIOLOGY: Primary | ICD-10-CM

## 2022-01-30 LAB
GROUP A STREP, MOLECULAR: NEGATIVE
SARS-COV-2 RNA RESP QL NAA+PROBE: NOT DETECTED

## 2022-01-30 PROCEDURE — U0002 COVID-19 LAB TEST NON-CDC: HCPCS | Performed by: CLINICAL NURSE SPECIALIST

## 2022-01-30 PROCEDURE — 99284 EMERGENCY DEPT VISIT MOD MDM: CPT

## 2022-01-30 PROCEDURE — 87651 STREP A DNA AMP PROBE: CPT | Performed by: CLINICAL NURSE SPECIALIST

## 2022-01-30 RX ORDER — AZITHROMYCIN 250 MG/1
250 TABLET, FILM COATED ORAL DAILY
Qty: 6 TABLET | Refills: 0 | Status: SHIPPED | OUTPATIENT
Start: 2022-01-30 | End: 2022-07-14

## 2022-01-30 RX ORDER — BENZONATATE 100 MG/1
100 CAPSULE ORAL 3 TIMES DAILY PRN
Qty: 20 CAPSULE | Refills: 0 | Status: SHIPPED | OUTPATIENT
Start: 2022-01-30 | End: 2022-02-09

## 2022-01-30 NOTE — DISCHARGE INSTRUCTIONS
Take Tylenol or Motrin as needed for body aches, fever, headache.  Check portal for COVID swab results.  Take medications as prescribed

## 2022-01-30 NOTE — ED PROVIDER NOTES
Encounter Date: 2022       History     Chief Complaint   Patient presents with    Sore Throat     Pt stated that since Tuesday she has been experiencing sore throat/body aches/cough. Seen in ED 2 days prior - covid negative.     Boni Duarte is an 31 y.o. female who complains of sore throat, body aches, cough since . Patient was seen in the ER COVID swab was negative and placed on steroids.  Patient states no improvement with steroid.  Needs work excuse.        Review of patient's allergies indicates:  No Known Allergies  Past Medical History:   Diagnosis Date    Anemia     Encounter for blood transfusion     Pneumonia     Pregnancy         Sickle cell anemia     Sickle cell disease 2015    UTI (urinary tract infection)      Past Surgical History:   Procedure Laterality Date    CHOLECYSTECTOMY       Family History   Problem Relation Age of Onset    No Known Problems Mother     No Known Problems Father      Social History     Tobacco Use    Smoking status: Never Smoker    Smokeless tobacco: Never Used   Substance Use Topics    Alcohol use: No     Alcohol/week: 0.0 standard drinks     Comment: occ    Drug use: No     Review of Systems   Constitutional: Negative for fever.   HENT: Positive for sore throat.    Respiratory: Positive for cough. Negative for shortness of breath.    Cardiovascular: Negative for chest pain.   Gastrointestinal: Negative for nausea.   Genitourinary: Negative for dysuria.   Musculoskeletal: Positive for arthralgias. Negative for back pain.   Skin: Negative for rash.   Neurological: Negative for weakness.   Hematological: Does not bruise/bleed easily.   All other systems reviewed and are negative.      Physical Exam     Initial Vitals [22 0946]   BP Pulse Resp Temp SpO2   123/74 92 18 98.7 °F (37.1 °C) 96 %      MAP       --         Physical Exam    Nursing note and vitals reviewed.  Constitutional: She appears well-developed and  well-nourished.   HENT:   Head: Normocephalic and atraumatic.   Mouth/Throat: Posterior oropharyngeal edema and posterior oropharyngeal erythema present.   Eyes: Pupils are equal, round, and reactive to light.   Neck:   Normal range of motion.  Cardiovascular: Normal rate and regular rhythm.   Pulmonary/Chest: Breath sounds normal.   Abdominal: Abdomen is soft. Bowel sounds are normal.   Musculoskeletal:         General: Normal range of motion.      Cervical back: Normal range of motion.     Neurological: She is alert and oriented to person, place, and time.   Skin: Skin is warm and dry.   Psychiatric: She has a normal mood and affect.         ED Course   Procedures  Labs Reviewed   GROUP A STREP, MOLECULAR   SARS-COV-2 (COVID-19) QUALITATIVE PCR          Imaging Results    None          Medications - No data to display  Medical Decision Making:   Differential Diagnosis:   URI, strep, COVID  Clinical Tests:   Lab Tests: Ordered and Reviewed                      Clinical Impression:   Final diagnoses:  [J02.9] Pharyngitis, unspecified etiology (Primary)          ED Disposition Condition    Discharge Stable        ED Prescriptions     Medication Sig Dispense Start Date End Date Auth. Provider    azithromycin (Z-JOE) 250 MG tablet Take 1 tablet (250 mg total) by mouth once daily. Take first 2 tablets together, then 1 every day until finished. 6 tablet 1/30/2022  Josee Sanchez NP    benzonatate (TESSALON) 100 MG capsule Take 1 capsule (100 mg total) by mouth 3 (three) times daily as needed. 20 capsule 1/30/2022 2/9/2022 Josee Sanchez NP        Follow-up Information     Follow up With Specialties Details Why Contact Info    Cal Clay MD Family Medicine  As needed [address] [facility]  [place] [address]  [phone]             Josee Sanchez NP  01/30/22 5541

## 2022-01-30 NOTE — Clinical Note
"Boni Yeh" Gerald was seen and treated in our emergency department on 1/30/2022.  She may return to work on 02/01/2022.       If you have any questions or concerns, please don't hesitate to call.      Ahmet Rosas MD"

## 2022-02-04 ENCOUNTER — SPECIALTY PHARMACY (OUTPATIENT)
Dept: PHARMACY | Facility: CLINIC | Age: 32
End: 2022-02-04
Payer: MEDICAID

## 2022-02-04 DIAGNOSIS — E83.19 IRON OVERLOAD: Primary | ICD-10-CM

## 2022-02-04 NOTE — TELEPHONE ENCOUNTER
Specialty Pharmacy - Medication/Referral Authorization  Specialty Pharmacy - Refill Coordination    Specialty Medication Orders Linked to Encounter    Flowsheet Row Most Recent Value   Medication #1 deferasirox (JADENU) 90 mg Tab (Order#[id number], Rx#)   Medication #2 deferasirox (JADENU) 360 mg Tab (Order#[id number], Rx#)          Refill Questions - Documented Responses    Flowsheet Row Most Recent Value   Patient Availability and HIPAA Verification    Does patient want to proceed with activity? Yes   HIPAA/medical authority confirmed? Yes   Relationship to patient of person spoken to? Self   Refill Screening Questions    Changes to allergies? No   Changes to medications? Yes  [patient was on Abs and steroids after recent viral infection but states that she has stopped the steroids and completed the Abx. Currently no longer taking Abx or steroids]   New conditions since last clinic visit? No   Unplanned office visit, urgent care, ED, or hospital admission in the last 4 weeks? Yes  [recent hospitalization due to viral infection]   How does patient/caregiver feel medication is working? Good   Financial problems or insurance changes? No   How many doses of your specialty medications were missed in the last 4 weeks? 2  [she is uncertain how many she missed but did state that she missed her dose while she was admitted. She overall does well taking her medication but does miss doses. MD office is already aware.]   Why were doses missed? Away from home   Would patient like to speak to a pharmacist? Yes   When does the patient need to receive the medication? 02/15/22   Refill Delivery Questions    How will the patient receive the medication? Delivery [facility]   When does the patient need to receive the medication? 02/15/22   Shipping Address Prescription   Address in [facility] confirmed and updated if neccessary? Yes   Expected Copay ($) 0   Is the patient able to afford the medication copay? Yes   Payment Method zero  copay   Days supply of Refill 30   Supplies needed? No supplies needed   Refill activity completed? Yes   Refill activity plan Refill scheduled   Shipment/Pickup Date: 02/11/22          Current Outpatient Medications   Medication Sig    azithromycin (Z-JOE) 250 MG tablet Take 1 tablet (250 mg total) by mouth once daily. Take first 2 tablets together, then 1 every day until finished.    benzonatate (TESSALON) 100 MG capsule Take 1 capsule (100 mg total) by mouth 3 (three) times daily as needed.    deferasirox (JADENU) 360 mg Tab Take 1 tablet (360 mg total) by mouth every other day. Take with 90mg tablet for total dose of 450mg    deferasirox (JADENU) 90 mg Tab Take 1 tablet (90 mg) by mouth every other day. Take with 360mg tablet for total dose of 450mg    folic acid (FOLVITE) 1 MG tablet Take one pill by mouth daily.   Last reviewed on 2/4/2022  4:39 PM by Anuja Martínez PharmD    Review of patient's allergies indicates:  No Known Allergies Last reviewed on  2/4/2022 4:39 PM by Anuja Martínez      Tasks added this encounter   2/4/2022 - Welcome Call  2/4/2022 - Referral Authorization   Tasks due within next 3 months   2/3/2022 - Refill Call (Auto Added)  3/28/2022 - Clinical - Follow Up Assesement (90 day)     Anuja Martínez, PharmD  [facility]  [address] [place] [address]  Phone: [phone]  Fax: [phone]

## 2022-03-14 ENCOUNTER — PATIENT MESSAGE (OUTPATIENT)
Dept: PHARMACY | Facility: CLINIC | Age: 32
End: 2022-03-14
Payer: MEDICAID

## 2022-03-14 ENCOUNTER — SPECIALTY PHARMACY (OUTPATIENT)
Dept: PHARMACY | Facility: CLINIC | Age: 32
End: 2022-03-14
Payer: MEDICAID

## 2022-03-14 NOTE — TELEPHONE ENCOUNTER
Specialty Pharmacy - Refill Coordination    Specialty Medication Orders Linked to Encounter    Flowsheet Row Most Recent Value   Medication #1 deferasirox (JADENU) 90 mg Tab (Order#[id number], Rx#[id number])   Medication #2 deferasirox (JADENU) 360 mg Tab (Order#[id number], Rx#[id number])          Refill Questions - Documented Responses    Flowsheet Row Most Recent Value   Patient Availability and HIPAA Verification    Does patient want to proceed with activity? Yes   HIPAA/medical authority confirmed? Yes   Relationship to patient of person spoken to? Self   Refill Screening Questions    Changes to allergies? No   Changes to medications? No   New conditions since last clinic visit? No   Unplanned office visit, urgent care, ED, or hospital admission in the last 4 weeks? No   How does patient/caregiver feel medication is working? Good   Financial problems or insurance changes? No   How many doses of your specialty medications were missed in the last 4 weeks? 0   Would patient like to speak to a pharmacist? No   When does the patient need to receive the medication? 03/21/22   Refill Delivery Questions    How will the patient receive the medication? Delivery [facility]   When does the patient need to receive the medication? 03/21/22   Shipping Address Home   Address in [facility] confirmed and updated if neccessary? Yes   Expected Copay ($) 0   Is the patient able to afford the medication copay? Yes   Payment Method zero copay   Days supply of Refill 30   Supplies needed? No supplies needed   Refill activity completed? Yes   Refill activity plan Refill scheduled   Shipment/Pickup Date: 03/17/22          Current Outpatient Medications   Medication Sig    azithromycin (Z-JOE) 250 MG tablet Take 1 tablet (250 mg total) by mouth once daily. Take first 2 tablets together, then 1 every day until finished. (Patient not taking: Reported on 3/2/2022)    deferasirox (JADENU) 360 mg Tab Take 1 tablet (360 mg total) by mouth  every other day. Take with 90mg tablet for total dose of 450mg    deferasirox (JADENU) 90 mg Tab Take 1 tablet (90 mg) by mouth every other day. Take with 360mg tablet for total dose of 450mg    folic acid (FOLVITE) 1 MG tablet Take one pill by mouth daily.   Last reviewed on 3/2/2022  1:19 PM by Aubree Steward MA    Review of patient's allergies indicates:  No Known Allergies Last reviewed on  3/2/2022 1:16 PM by Aubree Steward      Tasks added this encounter   No tasks added.   Tasks due within next 3 months   3/28/2022 - Clinical - Follow Up Assesement (90 day)  3/10/2022 - Refill Call (Auto Added)     [facility]  [address] [place] [address]  Phone: [phone]  Fax: [phone]

## 2022-04-13 ENCOUNTER — PATIENT MESSAGE (OUTPATIENT)
Dept: PHARMACY | Facility: CLINIC | Age: 32
End: 2022-04-13
Payer: MEDICAID

## 2022-04-14 ENCOUNTER — SPECIALTY PHARMACY (OUTPATIENT)
Dept: PHARMACY | Facility: CLINIC | Age: 32
End: 2022-04-14
Payer: MEDICAID

## 2022-04-14 DIAGNOSIS — E83.19 IRON OVERLOAD: Primary | ICD-10-CM

## 2022-04-14 NOTE — TELEPHONE ENCOUNTER
Specialty Pharmacy - Refill Coordination    Specialty Medication Orders Linked to Encounter    Flowsheet Row Most Recent Value   Medication #1 deferasirox (JADENU) 90 mg Tab (Order#[id number], Rx#[id number])   Medication #2 deferasirox (JADENU) 360 mg Tab (Order#[id number], Rx#[id number])          Refill Questions - Documented Responses    Flowsheet Row Most Recent Value   Patient Availability and HIPAA Verification    Does patient want to proceed with activity? Yes   HIPAA/medical authority confirmed? Yes   Relationship to patient of person spoken to? Self   Refill Screening Questions    Changes to allergies? No   Changes to medications? No   New conditions since last clinic visit? No   Unplanned office visit, urgent care, ED, or hospital admission in the last 4 weeks? No   How does patient/caregiver feel medication is working? Good   Financial problems or insurance changes? No   How many doses of your specialty medications were missed in the last 4 weeks? 0   Would patient like to speak to a pharmacist? No   When does the patient need to receive the medication? 04/21/22   Refill Delivery Questions    How will the patient receive the medication? Delivery [facility]   When does the patient need to receive the medication? 04/21/22   Shipping Address Home   Address in [facility] confirmed and updated if neccessary? Yes   Expected Copay ($) 0   Is the patient able to afford the medication copay? Yes   Payment Method zero copay   Days supply of Refill 30   Supplies needed? No supplies needed   Refill activity completed? Yes   Refill activity plan Refill scheduled   Shipment/Pickup Date: 04/20/22          Current Outpatient Medications   Medication Sig    azithromycin (Z-JOE) 250 MG tablet Take 1 tablet (250 mg total) by mouth once daily. Take first 2 tablets together, then 1 every day until finished. (Patient not taking: Reported on 3/2/2022)    deferasirox (JADENU) 360 mg Tab Take 1 tablet (360 mg total) by mouth  every other day. Take with 90mg tablet for total dose of 450mg    deferasirox (JADENU) 90 mg Tab Take 1 tablet (90 mg) by mouth every other day. Take with 360mg tablet for total dose of 450mg    folic acid (FOLVITE) 1 MG tablet Take one pill by mouth daily.   Last reviewed on 4/14/2022 11:58 AM by Anuja Martínez, Héctor    Review of patient's allergies indicates:  No Known Allergies Last reviewed on  4/14/2022 11:58 AM by Anuja Martínez      Tasks added this encounter   No tasks added.   Tasks due within next 3 months   4/13/2022 - Refill Call (Auto Added)     Anuja Martínez, PharmD  [facility]  [address] [place] [address]  Phone: [phone]  Fax: [phone]

## 2022-04-14 NOTE — TELEPHONE ENCOUNTER
Boni Duarte is a 31 y.o. female, who is followed by the specialty pharmacy service for management and education of her Paulu.  She has been on therapy with Jadenu for 19 months.  I have reviewed her electronic medical record and current medication list and determined that specialty medication adjustment Is not needed at this time.    Patient has not experienced adverse events.  She Is adherent reporting 0 missed doses since last review.  Adherence has been encouraged with the following mechanism(s): normal routine - she is taking every other day.  She is meeting goals of therapy and will continue treatment.    Follow Up Review 4/14/2022 3/14/2022 2/4/2022   # of missed doses 0 0 2   Reason - - Away from home   New Medications? No No Yes   New Conditions? No No No   New Allergies? No No No   Medication Effective? Good Good Good   Some recent data might be hidden       Therapy is appropriate to continue.    Therapy is effective: Yes  Recommendations: none at this time.  Review Method: Patient Contact    Labs reviewed from 3/2/22: ferritin 525  TBili is elevated at 4.7 and will continue to monitor    Anuja Martínez, PharmD  [facility]  [address]  [facility] [address]  Phone: [phone]  Fax: [phone]

## 2022-04-18 ENCOUNTER — PATIENT MESSAGE (OUTPATIENT)
Dept: ADMINISTRATIVE | Facility: OTHER | Age: 32
End: 2022-04-18
Payer: MEDICAID

## 2022-05-20 ENCOUNTER — SPECIALTY PHARMACY (OUTPATIENT)
Dept: PHARMACY | Facility: CLINIC | Age: 32
End: 2022-05-20
Payer: MEDICAID

## 2022-05-20 NOTE — TELEPHONE ENCOUNTER
Specialty Pharmacy - Refill Coordination    Specialty Medication Orders Linked to Encounter    Flowsheet Row Most Recent Value   Medication #1 deferasirox (JADENU) 90 mg Tab (Order#[id number], Rx#[id number])   Medication #2 deferasirox (JADENU) 360 mg Tab (Order#[id number], Rx#[id number])          Refill Questions - Documented Responses    Flowsheet Row Most Recent Value   Patient Availability and HIPAA Verification    Does patient want to proceed with activity? Yes   HIPAA/medical authority confirmed? Yes   Relationship to patient of person spoken to? Self   Refill Screening Questions    Changes to allergies? No   Changes to medications? No   New conditions since last clinic visit? No   Unplanned office visit, urgent care, ED, or hospital admission in the last 4 weeks? No   How does patient/caregiver feel medication is working? Good   Financial problems or insurance changes? No   How many doses of your specialty medications were missed in the last 4 weeks? 1   Would patient like to speak to a pharmacist? No   When does the patient need to receive the medication? 05/24/22   Refill Delivery Questions    How will the patient receive the medication? Delivery [facility]   When does the patient need to receive the medication? 05/24/22   Shipping Address Prescription   Address in [facility] confirmed and updated if neccessary? Yes   Expected Copay ($) 0   Is the patient able to afford the medication copay? Yes   Payment Method zero copay   Days supply of Refill 30   Supplies needed? No supplies needed   Refill activity completed? Yes   Refill activity plan Refill scheduled   Shipment/Pickup Date: 05/24/22          Current Outpatient Medications   Medication Sig    azithromycin (Z-JOE) 250 MG tablet Take 1 tablet (250 mg total) by mouth once daily. Take first 2 tablets together, then 1 every day until finished.    deferasirox (JADENU) 360 mg Tab Take 1 tablet (360 mg total) by mouth every other day. Take with 90mg  tablet for total dose of 450mg    deferasirox (JADENU) 90 mg Tab Take 1 tablet (90 mg) by mouth every other day. Take with 360mg tablet for total dose of 450mg    folic acid (FOLVITE) 1 MG tablet Take one pill by mouth daily.   Last reviewed on 5/11/2022 11:17 AM by Kelley Sheikh MD    Review of patient's allergies indicates:  No Known Allergies Last reviewed on  5/11/2022 11:17 AM by Kelley Sheikh      Tasks added this encounter   6/16/2022 - Refill Call (Auto Added)   Tasks due within next 3 months   No tasks due.     [facility]  [address] [facility] [address]  Phone: [phone]  Fax: [phone]

## 2022-06-14 ENCOUNTER — HOSPITAL ENCOUNTER (EMERGENCY)
Facility: HOSPITAL | Age: 32
Discharge: HOME | End: 2022-06-14
Attending: STUDENT IN AN ORGANIZED HEALTH CARE EDUCATION/TRAINING PROGRAM
Payer: MEDICAID

## 2022-06-14 VITALS
BODY MASS INDEX: 23.57 KG/M2 | RESPIRATION RATE: 14 BRPM | DIASTOLIC BLOOD PRESSURE: 66 MMHG | HEART RATE: 89 BPM | OXYGEN SATURATION: 100 % | TEMPERATURE: 99 F | SYSTOLIC BLOOD PRESSURE: 102 MMHG | WEIGHT: 155 LBS

## 2022-06-14 DIAGNOSIS — R10.9 ABDOMINAL PAIN, UNSPECIFIED ABDOMINAL LOCATION: Primary | ICD-10-CM

## 2022-06-14 LAB
B-HCG UR QL: NEGATIVE
BILIRUB UR QL STRIP: NEGATIVE
CLARITY UR: CLEAR
COLOR UR: YELLOW
GLUCOSE UR QL STRIP: NEGATIVE
HGB UR QL STRIP: NEGATIVE
KETONES UR QL STRIP: NEGATIVE
LEUKOCYTE ESTERASE UR QL STRIP: NEGATIVE
NITRITE UR QL STRIP: NEGATIVE
PH UR STRIP: 7 [PH] (ref 5–8)
PROT UR QL STRIP: NEGATIVE
SP GR UR STRIP: 1.01 (ref 1–1.03)
URN SPEC COLLECT METH UR: ABNORMAL
UROBILINOGEN UR STRIP-ACNC: ABNORMAL EU/DL

## 2022-06-14 PROCEDURE — 99283 EMERGENCY DEPT VISIT LOW MDM: CPT | Mod: 25

## 2022-06-14 PROCEDURE — 81025 URINE PREGNANCY TEST: CPT | Performed by: STUDENT IN AN ORGANIZED HEALTH CARE EDUCATION/TRAINING PROGRAM

## 2022-06-14 PROCEDURE — 81003 URINALYSIS AUTO W/O SCOPE: CPT | Performed by: STUDENT IN AN ORGANIZED HEALTH CARE EDUCATION/TRAINING PROGRAM

## 2022-06-14 PROCEDURE — 25000003 PHARM REV CODE 250: Performed by: STUDENT IN AN ORGANIZED HEALTH CARE EDUCATION/TRAINING PROGRAM

## 2022-06-14 RX ORDER — IBUPROFEN 600 MG/1
600 TABLET ORAL
Status: COMPLETED | OUTPATIENT
Start: 2022-06-14 | End: 2022-06-14

## 2022-06-14 RX ADMIN — IBUPROFEN 600 MG: 600 TABLET ORAL at 09:06

## 2022-06-14 NOTE — Clinical Note
"Boni Yeh" Gerald was seen and treated in our emergency department on 6/14/2022.  She may return to work on 06/16/2022.       If you have any questions or concerns, please don't hesitate to call.      Glenda Amador RN    "

## 2022-06-15 NOTE — ED PROVIDER NOTES
History  Chief Complaint   Patient presents with    Abdominal Pain     Pt complains of abdominal pain below the navel x 1 days.      Patient is a 32 y/o female with hx of sickle cell anemia who presents c/o abd pain.  Pain is noted be across the lower.  Patient associated burning/frequency with urination believes she may have a urinary tract infection.  All other systems reviewed and noted to be negative.  Pain rated 6/10, described as achy and noted to be constant.  Abdominal pain was acute onset yesterday.  No specific exacerbating or alleviating factors reported.           Past Medical History:   Diagnosis Date    Anemia     Encounter for blood transfusion     Pneumonia     Pregnancy         Sickle cell anemia     Sickle cell disease 2015    UTI (urinary tract infection)        Past Surgical History:   Procedure Laterality Date    CHOLECYSTECTOMY         Family History   Problem Relation Age of Onset    No Known Problems Mother     No Known Problems Father        Social History     Tobacco Use    Smoking status: Never Smoker    Smokeless tobacco: Never Used   Substance Use Topics    Alcohol use: No     Alcohol/week: 0.0 standard drinks     Comment: occ    Drug use: No       ROS  Review of Systems   Constitutional: Negative for fever.   HENT: Negative for congestion.    Eyes: Negative for visual disturbance.   Respiratory: Negative for cough and shortness of breath.    Cardiovascular: Negative for chest pain.   Gastrointestinal: Positive for abdominal pain. Negative for nausea and vomiting.   Genitourinary: Positive for dysuria.   Musculoskeletal: Negative for arthralgias.   Skin: Negative for color change.   Allergic/Immunologic: Negative for immunocompromised state.   Neurological: Negative for headaches.   Hematological: Negative for adenopathy. Does not bruise/bleed easily.       Physical Exam  /66   Pulse 89   Temp 99.4 °F (37.4 °C)   Resp 14   Wt 70.3 kg (155 lb)   LMP  05/31/2022   SpO2 100%   Breastfeeding No   BMI 23.57 kg/m²   Physical Exam    Constitutional: She appears well-developed and well-nourished. She is cooperative.   HENT:   Head: Normocephalic and atraumatic.   Eyes: Conjunctivae, EOM and lids are normal. Pupils are equal, round, and reactive to light.   Neck: Phonation normal.   Normal range of motion.  Cardiovascular: Normal rate, regular rhythm and intact distal pulses.   Pulmonary/Chest: Breath sounds normal. No stridor. No respiratory distress.   Abdominal: Abdomen is soft. There is abdominal tenderness.   Musculoskeletal:         General: No tenderness. Normal range of motion.      Cervical back: Normal range of motion.     Neurological: She is alert and oriented to person, place, and time.   Skin: Skin is warm and dry.   Psychiatric: She has a normal mood and affect. Her speech is normal and behavior is normal.               Labs Reviewed   URINALYSIS, REFLEX TO URINE CULTURE - Abnormal; Notable for the following components:       Result Value    Urobilinogen, UA 2.0-3.0 (*)     All other components within normal limits    Narrative:     Preferred Collection Type->Urine, Clean Catch  Specimen Source->Urine   PREGNANCY TEST, URINE RAPID    Narrative:     Specimen Source->Urine                          Procedures    ED Course as of 06/15/22 0458   Tue Jun 14, 2022 2045 UA negative.  [NA]   2052 Patient with history of sickle cell anemia.  Noted symptoms do not feel like acute sickle cell pain crisis.  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  Will give Motrin for symptomatic support.  Patient advised to return to the ED as needed [NA]      ED Course User Index  [NA] Melva Horta MD            [facility]      Clinical Impression  The encounter diagnosis was Abdominal pain, unspecified abdominal location.       Melva Horta MD  06/15/22 0458

## 2022-06-16 ENCOUNTER — SPECIALTY PHARMACY (OUTPATIENT)
Dept: PHARMACY | Facility: CLINIC | Age: 32
End: 2022-06-16
Payer: MEDICAID

## 2022-06-16 NOTE — TELEPHONE ENCOUNTER
Jerri is out of refills. Outgoing call to pt to see how many she had on hand. States about a week and a half. Informed her we messaged [facility] and will call to set up refill once new RX received. Pt voiced understanding.

## 2022-06-20 NOTE — TELEPHONE ENCOUNTER
Specialty Pharmacy - Refill Coordination    Specialty Medication Orders Linked to Encounter    Flowsheet Row Most Recent Value   Medication #1 deferasirox (JADENU) 90 mg Tab (Order#[id number], Rx#[id number])   Medication #2 deferasirox (JADENU) 360 mg Tab (Order#[id number], Rx#[id number])          Refill Questions - Documented Responses    Flowsheet Row Most Recent Value   Patient Availability and HIPAA Verification    Does patient want to proceed with activity? Yes   HIPAA/medical authority confirmed? Yes   Relationship to patient of person spoken to? Self   Refill Screening Questions    Changes to allergies? No   Changes to medications? No   New conditions since last clinic visit? No   Unplanned office visit, urgent care, ED, or hospital admission in the last 4 weeks? No   How does patient/caregiver feel medication is working? Good   Financial problems or insurance changes? No   How many doses of your specialty medications were missed in the last 4 weeks? 2   Why were doses missed? Felt ill or sick   Would patient like to speak to a pharmacist? No   When does the patient need to receive the medication? 06/27/22   Refill Delivery Questions    How will the patient receive the medication? Delivery [facility]   When does the patient need to receive the medication? 06/27/22   Shipping Address Home   Address in [facility] confirmed and updated if neccessary? Yes   Expected Copay ($) 0   Is the patient able to afford the medication copay? Yes   Payment Method zero copay   Days supply of Refill 30   Supplies needed? No supplies needed   Refill activity completed? Yes   Refill activity plan Refill scheduled   Shipment/Pickup Date: 06/23/22          Current Outpatient Medications   Medication Sig    azithromycin (Z-JOE) 250 MG tablet Take 1 tablet (250 mg total) by mouth once daily. Take first 2 tablets together, then 1 every day until finished.    deferasirox (JADENU) 360 mg Tab Take 1 tablet (360 mg total) by mouth  every other day. Take with 90mg tablet for total dose of 450mg    deferasirox (JADENU) 90 mg Tab Take 1 tablet (90 mg) by mouth every other day. Take with 360mg tablet for total dose of 450mg    folic acid (FOLVITE) 1 MG tablet Take one pill by mouth daily.   Last reviewed on 6/14/2022  7:30 PM by Juan Carlos Clark NREMT-P    Review of patient's allergies indicates:  No Known Allergies Last reviewed on  6/14/2022 7:29 PM by Juan Carlos Clark    Interventions added this encounter   Closed: [facility] Patient Intervention: deferasirox (JADENU) 360 mg Tab     Tasks added this encounter   No tasks added.   Tasks due within next 3 months   6/16/2022 - Refill Call (Auto Added)     Jesusita Heart, PharmD  [facility]  [address] [place], [place] [address]  [facility] [address]  Phone: [phone]  Fax: [phone]

## 2022-07-14 PROBLEM — N75.0 BARTHOLIN CYST: Status: ACTIVE | Noted: 2022-07-14

## 2022-07-20 ENCOUNTER — SPECIALTY PHARMACY (OUTPATIENT)
Dept: PHARMACY | Facility: CLINIC | Age: 32
End: 2022-07-20
Payer: MEDICAID

## 2022-07-20 NOTE — TELEPHONE ENCOUNTER
Outgoing call to pt about Paulu. Pt states she has about 4 days on hand. RX out of refills sent request to [facility]. Will continue to follow up. Pt voiced understanding

## 2022-07-20 NOTE — TELEPHONE ENCOUNTER
Specialty Pharmacy - Refill Coordination    Specialty Medication Orders Linked to Encounter    Flowsheet Row Most Recent Value   Medication #1 deferasirox (JADENU) 90 mg Tab (Order#[id number], Rx#[id number])   Medication #2 deferasirox (JADENU) 360 mg Tab (Order#[id number], Rx#[id number])          Refill Questions - Documented Responses    Flowsheet Row Most Recent Value   Patient Availability and HIPAA Verification    Does patient want to proceed with activity? Yes   HIPAA/medical authority confirmed? Yes   Relationship to patient of person spoken to? Self   Refill Screening Questions    Changes to allergies? No   Changes to medications? No   New conditions since last clinic visit? No   Unplanned office visit, urgent care, ED, or hospital admission in the last 4 weeks? No   How does patient/caregiver feel medication is working? Good   Financial problems or insurance changes? No   How many doses of your specialty medications were missed in the last 4 weeks? 0   Would patient like to speak to a pharmacist? No   When does the patient need to receive the medication? 07/29/22   Refill Delivery Questions    How will the patient receive the medication? Mail   When does the patient need to receive the medication? 07/29/22   Shipping Address Home   Address in [facility] confirmed and updated if neccessary? Yes   Expected Copay ($) 0   Is the patient able to afford the medication copay? Yes   Payment Method zero copay   Days supply of Refill 30   Supplies needed? No supplies needed   Refill activity completed? Yes   Refill activity plan Refill scheduled   Shipment/Pickup Date: 07/26/22          Current Outpatient Medications   Medication Sig    deferasirox (JADENU) 360 mg Tab Take 1 tablet (360 mg total) by mouth every other day. Take with 90mg tablet for total dose of 450mg    deferasirox (JADENU) 90 mg Tab Take 1 tablet (90 mg) by mouth every other day. Take with 360mg tablet for total dose of 450mg    folic acid  (FOLVITE) 1 MG tablet Take one pill by mouth daily.    hydroxyurea (HYDREA) 500 mg Cap Take 1 capsule (500 mg total) by mouth once daily.   Last reviewed on 7/14/2022  3:08 PM by Chantal Wilson MD    Review of patient's allergies indicates:  No Known Allergies Last reviewed on  7/15/2022 2:46 PM by Meng Holguin      Tasks added this encounter   8/21/2022 - Refill Call (Auto Added)   Tasks due within next 3 months   No tasks due.     Alyssa Figueroa, PharmD  [facility]  [address] [facility] [address]  Phone: [phone]  Fax: [phone]

## 2022-07-27 ENCOUNTER — PATIENT MESSAGE (OUTPATIENT)
Dept: OBSTETRICS AND GYNECOLOGY | Facility: CLINIC | Age: 32
End: 2022-07-27
Payer: MEDICAID

## 2022-08-22 ENCOUNTER — SPECIALTY PHARMACY (OUTPATIENT)
Dept: PHARMACY | Facility: CLINIC | Age: 32
End: 2022-08-22
Payer: MEDICAID

## 2022-08-22 NOTE — TELEPHONE ENCOUNTER
Patient stated she has 3 days left on hand. Refill request sent to provider. We will process new script once received.

## 2022-08-23 NOTE — TELEPHONE ENCOUNTER
Specialty Pharmacy - Refill Coordination    Specialty Medication Orders Linked to Encounter    Flowsheet Row Most Recent Value   Medication #1 deferasirox (JADENU) 90 mg Tab (Order#[id number], Rx#[id number])   Medication #2 deferasirox (JADENU) 360 mg Tab (Order#[id number], Rx#[id number])          Refill Questions - Documented Responses    Flowsheet Row Most Recent Value   Patient Availability and HIPAA Verification    Does patient want to proceed with activity? Yes   HIPAA/medical authority confirmed? Yes   Relationship to patient of person spoken to? Self   Refill Screening Questions    Changes to allergies? No   Changes to medications? No   New conditions since last clinic visit? No   Unplanned office visit, urgent care, ED, or hospital admission in the last 4 weeks? No   How does patient/caregiver feel medication is working? Good   Financial problems or insurance changes? No   How many doses of your specialty medications were missed in the last 4 weeks? 0   Would patient like to speak to a pharmacist? No   When does the patient need to receive the medication? 08/27/22   Refill Delivery Questions    How will the patient receive the medication? Delivery [facility]   When does the patient need to receive the medication? 08/27/22   Shipping Address Home   Address in [facility] confirmed and updated if neccessary? Yes   Expected Copay ($) 0   Is the patient able to afford the medication copay? Yes   Payment Method zero copay   Days supply of Refill 30   Supplies needed? No supplies needed   Refill activity completed? Yes   Refill activity plan Refill scheduled   Shipment/Pickup Date: 08/24/22          Current Outpatient Medications   Medication Sig    deferasirox (JADENU) 360 mg Tab Take 1 tablet (360 mg total) by mouth every other day. Take with 90mg tablet for total dose of 450mg    deferasirox (JADENU) 90 mg Tab Take 1 tablet (90 mg) by mouth every other day. Take with 360mg tablet for total dose of 450mg     folic acid (FOLVITE) 1 MG tablet Take one pill by mouth daily.    hydroxyurea (HYDREA) 500 mg Cap Take 1 capsule (500 mg total) by mouth once daily.   Last reviewed on 8/10/2022 12:46 PM by Aubree Steward MA    Review of patient's allergies indicates:  No Known Allergies Last reviewed on  8/10/2022 12:45 PM by Aubree Steward      Tasks added this encounter   9/19/2022 - Refill Call (Auto Added)   Tasks due within next 3 months   No tasks due.     Judy Ruvalcaba [facility]  [address] [facility] [address]  Phone: [phone]  Fax: [phone]

## 2022-09-22 ENCOUNTER — PATIENT MESSAGE (OUTPATIENT)
Dept: PHARMACY | Facility: CLINIC | Age: 32
End: 2022-09-22
Payer: MEDICAID

## 2022-09-23 ENCOUNTER — SPECIALTY PHARMACY (OUTPATIENT)
Dept: PHARMACY | Facility: CLINIC | Age: 32
End: 2022-09-23
Payer: MEDICAID

## 2022-09-23 NOTE — TELEPHONE ENCOUNTER
Specialty Pharmacy - Refill Coordination    Specialty Medication Orders Linked to Encounter      Flowsheet Row Most Recent Value   Medication #1 deferasirox (JADENU) 90 mg Tab (Order#[id number], Rx#[id number])   Medication #2 deferasirox (JADENU) 360 mg Tab (Order#[id number], Rx#[id number])            Refill Questions - Documented Responses      Flowsheet Row Most Recent Value   Patient Availability and HIPAA Verification    Does patient want to proceed with activity? Yes   HIPAA/medical authority confirmed? Yes   Relationship to patient of person spoken to? Self   Refill Screening Questions    Changes to allergies? No   Changes to medications? No   New conditions since last clinic visit? No   Unplanned office visit, urgent care, ED, or hospital admission in the last 4 weeks? No   How does patient/caregiver feel medication is working? Good   Financial problems or insurance changes? No   How many doses of your specialty medications were missed in the last 4 weeks? 1   Why were doses missed? Simply forgot   Would patient like to speak to a pharmacist? No   When does the patient need to receive the medication? 09/30/22   Refill Delivery Questions    How will the patient receive the medication? Delivery [facility]   When does the patient need to receive the medication? 09/30/22   Shipping Address Home   Address in [facility] confirmed and updated if neccessary? Yes   Expected Copay ($) 0   Is the patient able to afford the medication copay? Yes   Payment Method zero copay   Days supply of Refill 30   Supplies needed? No supplies needed   Refill activity completed? Yes   Refill activity plan Refill scheduled   Shipment/Pickup Date: 09/27/22            Current Outpatient Medications   Medication Sig    deferasirox (JADENU) 360 mg Tab Take 1 tablet (360 mg total) by mouth every other day. Take with 90mg tablet for total dose of 450mg    deferasirox (JADENU) 90 mg Tab Take 1 tablet (90 mg) by mouth every other day. Take  with 360mg tablet for total dose of 450mg    folic acid (FOLVITE) 1 MG tablet Take one pill by mouth daily.    hydroxyurea (HYDREA) 500 mg Cap Take 1 capsule (500 mg total) by mouth once daily.   Last reviewed on 9/2/2022 10:17 AM by Johanna Desir MA    Review of patient's allergies indicates:  No Known Allergies Last reviewed on  9/2/2022 10:17 AM by Johanna Desir      Tasks added this encounter   No tasks added.   Tasks due within next 3 months   9/19/2022 - Refill Call (Auto Added)     Kitty Garcia, PharmD  [facility]  [address] [place] [address]  Phone: [phone]  Fax: [phone]

## 2022-10-22 ENCOUNTER — HOSPITAL ENCOUNTER (EMERGENCY)
Facility: HOSPITAL | Age: 32
Discharge: HOME | End: 2022-10-22
Attending: EMERGENCY MEDICINE
Payer: MEDICAID

## 2022-10-22 VITALS
HEART RATE: 97 BPM | OXYGEN SATURATION: 98 % | TEMPERATURE: 99 F | BODY MASS INDEX: 23.49 KG/M2 | RESPIRATION RATE: 18 BRPM | HEIGHT: 68 IN | SYSTOLIC BLOOD PRESSURE: 105 MMHG | WEIGHT: 155 LBS | DIASTOLIC BLOOD PRESSURE: 60 MMHG

## 2022-10-22 DIAGNOSIS — R50.9 FEVER: ICD-10-CM

## 2022-10-22 DIAGNOSIS — J02.0 STREP PHARYNGITIS: Primary | ICD-10-CM

## 2022-10-22 LAB
ALBUMIN SERPL BCP-MCNC: 4.5 G/DL (ref 3.5–5.2)
ALP SERPL-CCNC: 64 U/L (ref 55–135)
ALT SERPL W/O P-5'-P-CCNC: 33 U/L (ref 10–44)
ANION GAP SERPL CALC-SCNC: 7 MMOL/L (ref 8–16)
AST SERPL-CCNC: 43 U/L (ref 10–40)
BACTERIA #/AREA URNS HPF: NEGATIVE /HPF
BASOPHILS # BLD AUTO: 0.06 K/UL (ref 0–0.2)
BASOPHILS NFR BLD: 0.3 % (ref 0–1.9)
BILIRUB SERPL-MCNC: 7 MG/DL (ref 0.1–1)
BILIRUB UR QL STRIP: ABNORMAL
BUN SERPL-MCNC: 5 MG/DL (ref 6–20)
CALCIUM SERPL-MCNC: 8.6 MG/DL (ref 8.7–10.5)
CHLORIDE SERPL-SCNC: 102 MMOL/L (ref 95–110)
CLARITY UR: ABNORMAL
CO2 SERPL-SCNC: 27 MMOL/L (ref 23–29)
COLOR UR: YELLOW
CREAT SERPL-MCNC: 0.6 MG/DL (ref 0.5–1.4)
CTP QC/QA: YES
CTP QC/QA: YES
DIFFERENTIAL METHOD: ABNORMAL
EOSINOPHIL # BLD AUTO: 0 K/UL (ref 0–0.5)
EOSINOPHIL NFR BLD: 0 % (ref 0–8)
ERYTHROCYTE [DISTWIDTH] IN BLOOD BY AUTOMATED COUNT: 15.6 % (ref 11.5–14.5)
EST. GFR  (NO RACE VARIABLE): >60 ML/MIN/1.73 M^2
GLUCOSE SERPL-MCNC: 100 MG/DL (ref 70–110)
GLUCOSE UR QL STRIP: NEGATIVE
GROUP A STREP, MOLECULAR: POSITIVE
HCT VFR BLD AUTO: 25.5 % (ref 37–48.5)
HGB BLD-MCNC: 9 G/DL (ref 12–16)
HGB UR QL STRIP: NEGATIVE
HYALINE CASTS #/AREA URNS LPF: 1.9 /LPF
IMM GRANULOCYTES # BLD AUTO: 0.1 K/UL (ref 0–0.04)
IMM GRANULOCYTES NFR BLD AUTO: 0.5 % (ref 0–0.5)
KETONES UR QL STRIP: ABNORMAL
LACTATE SERPL-SCNC: 1.1 MMOL/L (ref 0.5–2.2)
LEUKOCYTE ESTERASE UR QL STRIP: ABNORMAL
LYMPHOCYTES # BLD AUTO: 2.1 K/UL (ref 1–4.8)
LYMPHOCYTES NFR BLD: 10.3 % (ref 18–48)
MAGNESIUM SERPL-MCNC: 2.2 MG/DL (ref 1.6–2.6)
MCH RBC QN AUTO: 31.1 PG (ref 27–31)
MCHC RBC AUTO-ENTMCNC: 35.3 G/DL (ref 32–36)
MCV RBC AUTO: 88 FL (ref 82–98)
MICROSCOPIC COMMENT: ABNORMAL
MONOCYTES # BLD AUTO: 2 K/UL (ref 0.3–1)
MONOCYTES NFR BLD: 10.1 % (ref 4–15)
NEUTROPHILS # BLD AUTO: 15.9 K/UL (ref 1.8–7.7)
NEUTROPHILS NFR BLD: 78.8 % (ref 38–73)
NITRITE UR QL STRIP: NEGATIVE
NRBC BLD-RTO: 0 /100 WBC
PH UR STRIP: 7 [PH] (ref 5–8)
PHOSPHATE SERPL-MCNC: 2.5 MG/DL (ref 2.7–4.5)
PLATELET # BLD AUTO: 380 K/UL (ref 150–450)
PMV BLD AUTO: 10.1 FL (ref 9.2–12.9)
POC MOLECULAR INFLUENZA A AGN: NEGATIVE
POC MOLECULAR INFLUENZA B AGN: NEGATIVE
POTASSIUM SERPL-SCNC: 3.1 MMOL/L (ref 3.5–5.1)
PROCALCITONIN SERPL IA-MCNC: 0.15 NG/ML
PROT SERPL-MCNC: 9 G/DL (ref 6–8.4)
PROT UR QL STRIP: ABNORMAL
RBC # BLD AUTO: 2.89 M/UL (ref 4–5.4)
RBC #/AREA URNS HPF: 3 /HPF (ref 0–4)
RETICS/RBC NFR AUTO: 12.9 % (ref 0.5–2.5)
SARS-COV-2 RDRP RESP QL NAA+PROBE: NEGATIVE
SODIUM SERPL-SCNC: 136 MMOL/L (ref 136–145)
SP GR UR STRIP: 1.01 (ref 1–1.03)
SQUAMOUS #/AREA URNS HPF: 11 /HPF
URN SPEC COLLECT METH UR: ABNORMAL
UROBILINOGEN UR STRIP-ACNC: ABNORMAL EU/DL
WBC # BLD AUTO: 20.21 K/UL (ref 3.9–12.7)
WBC #/AREA URNS HPF: 4 /HPF (ref 0–5)

## 2022-10-22 PROCEDURE — 85025 COMPLETE CBC W/AUTO DIFF WBC: CPT | Performed by: EMERGENCY MEDICINE

## 2022-10-22 PROCEDURE — 84145 PROCALCITONIN (PCT): CPT | Performed by: EMERGENCY MEDICINE

## 2022-10-22 PROCEDURE — 63600175 PHARM REV CODE 636 W HCPCS: Performed by: EMERGENCY MEDICINE

## 2022-10-22 PROCEDURE — 81000 URINALYSIS NONAUTO W/SCOPE: CPT | Performed by: EMERGENCY MEDICINE

## 2022-10-22 PROCEDURE — 96375 TX/PRO/DX INJ NEW DRUG ADDON: CPT

## 2022-10-22 PROCEDURE — 83735 ASSAY OF MAGNESIUM: CPT | Performed by: EMERGENCY MEDICINE

## 2022-10-22 PROCEDURE — 87635 SARS-COV-2 COVID-19 AMP PRB: CPT | Performed by: EMERGENCY MEDICINE

## 2022-10-22 PROCEDURE — 87040 BLOOD CULTURE FOR BACTERIA: CPT | Mod: 59 | Performed by: EMERGENCY MEDICINE

## 2022-10-22 PROCEDURE — 25000003 PHARM REV CODE 250: Performed by: EMERGENCY MEDICINE

## 2022-10-22 PROCEDURE — 83605 ASSAY OF LACTIC ACID: CPT | Performed by: EMERGENCY MEDICINE

## 2022-10-22 PROCEDURE — 85045 AUTOMATED RETICULOCYTE COUNT: CPT | Performed by: EMERGENCY MEDICINE

## 2022-10-22 PROCEDURE — 96374 THER/PROPH/DIAG INJ IV PUSH: CPT

## 2022-10-22 PROCEDURE — 87651 STREP A DNA AMP PROBE: CPT | Performed by: EMERGENCY MEDICINE

## 2022-10-22 PROCEDURE — 84100 ASSAY OF PHOSPHORUS: CPT | Performed by: EMERGENCY MEDICINE

## 2022-10-22 PROCEDURE — 80053 COMPREHEN METABOLIC PANEL: CPT | Performed by: EMERGENCY MEDICINE

## 2022-10-22 PROCEDURE — 36415 COLL VENOUS BLD VENIPUNCTURE: CPT | Performed by: EMERGENCY MEDICINE

## 2022-10-22 PROCEDURE — 99284 EMERGENCY DEPT VISIT MOD MDM: CPT | Mod: 25

## 2022-10-22 PROCEDURE — 87502 INFLUENZA DNA AMP PROBE: CPT

## 2022-10-22 RX ORDER — IBUPROFEN 800 MG/1
800 TABLET ORAL
Status: COMPLETED | OUTPATIENT
Start: 2022-10-22 | End: 2022-10-22

## 2022-10-22 RX ORDER — AMOXICILLIN AND CLAVULANATE POTASSIUM 875; 125 MG/1; MG/1
1 TABLET, FILM COATED ORAL 2 TIMES DAILY
Qty: 14 TABLET | Refills: 0 | Status: SHIPPED | OUTPATIENT
Start: 2022-10-22 | End: 2022-10-29

## 2022-10-22 RX ORDER — DEXAMETHASONE SODIUM PHOSPHATE 4 MG/ML
8 INJECTION, SOLUTION INTRA-ARTICULAR; INTRALESIONAL; INTRAMUSCULAR; INTRAVENOUS; SOFT TISSUE
Status: COMPLETED | OUTPATIENT
Start: 2022-10-22 | End: 2022-10-22

## 2022-10-22 RX ORDER — ACETAMINOPHEN 500 MG
1000 TABLET ORAL
Status: COMPLETED | OUTPATIENT
Start: 2022-10-22 | End: 2022-10-22

## 2022-10-22 RX ORDER — DEXAMETHASONE SODIUM PHOSPHATE 4 MG/ML
8 INJECTION, SOLUTION INTRA-ARTICULAR; INTRALESIONAL; INTRAMUSCULAR; INTRAVENOUS; SOFT TISSUE
Status: DISCONTINUED | OUTPATIENT
Start: 2022-10-22 | End: 2022-10-22

## 2022-10-22 RX ADMIN — ACETAMINOPHEN 1000 MG: 500 TABLET ORAL at 10:10

## 2022-10-22 RX ADMIN — DEXAMETHASONE SODIUM PHOSPHATE 8 MG: 4 INJECTION, SOLUTION INTRA-ARTICULAR; INTRALESIONAL; INTRAMUSCULAR; INTRAVENOUS; SOFT TISSUE at 10:10

## 2022-10-22 RX ADMIN — IBUPROFEN 800 MG: 800 TABLET, FILM COATED ORAL at 10:10

## 2022-10-22 RX ADMIN — SODIUM CHLORIDE, SODIUM LACTATE, POTASSIUM CHLORIDE, AND CALCIUM CHLORIDE 2109 ML: .6; .31; .03; .02 INJECTION, SOLUTION INTRAVENOUS at 10:10

## 2022-10-22 RX ADMIN — CEFTRIAXONE 2 G: 2 INJECTION, SOLUTION INTRAVENOUS at 11:10

## 2022-10-22 NOTE — ED PROVIDER NOTES
Encounter Date: 10/22/2022       History     Chief Complaint   Patient presents with    Sore Throat     Pt stated that she went to Urgent Care last night - negative flu/covid/strep. Stated that she was referred to ED today due to hx sickle cell anemia - presents with complaints of fatigue/neck pain/ sore throat for the past 2 days.      32-year-old female with sickle cell disease presents to the emergency department with this sore throat for the past few days, running fever as well, seen at urgent care yesterday and was negative for influenza, COVID as well as strep.  Into the emergency department for evaluation.  Temperature here is 102.2° F. patient complaining of mild low back pain that is improved since yesterday as well.  No nausea vomiting, no abdominal pain, no chest pain, no shortness of breath.  Alert oriented x4, GCS is 15    Review of patient's allergies indicates:  No Known Allergies  Past Medical History:   Diagnosis Date    Anemia     Encounter for blood transfusion     Pneumonia     Pregnancy         Sickle cell anemia     Sickle cell disease 2015    UTI (urinary tract infection)      Past Surgical History:   Procedure Laterality Date    CHOLECYSTECTOMY       Family History   Problem Relation Age of Onset    No Known Problems Mother     No Known Problems Father      Social History     Tobacco Use    Smoking status: Never    Smokeless tobacco: Never   Substance Use Topics    Alcohol use: No     Alcohol/week: 0.0 standard drinks     Comment: occ    Drug use: No     Review of Systems   Constitutional:  Positive for fever.   HENT:  Positive for sore throat.    Respiratory:  Negative for shortness of breath.    Cardiovascular:  Negative for chest pain.   Gastrointestinal:  Negative for nausea.   Genitourinary:  Negative for dysuria.   Musculoskeletal:  Negative for back pain.   Skin:  Negative for rash.   Neurological:  Negative for weakness.   Hematological:  Does not bruise/bleed easily.   All  other systems reviewed and are negative.    Physical Exam     Initial Vitals [10/22/22 0954]   BP Pulse Resp Temp SpO2   115/62 (!) 126 18 (!) 102.2 °F (39 °C) 97 %      MAP       --         Physical Exam    Nursing note and vitals reviewed.  Constitutional: She appears well-developed and well-nourished. She is not diaphoretic. No distress.   Febrile   HENT:   Head: Normocephalic and atraumatic.   Erythema to posterior pharynx, no signs of abscess, no exudate, airway patent   Eyes: Conjunctivae and EOM are normal. Pupils are equal, round, and reactive to light.   Neck: Neck supple.   Normal range of motion.  Cardiovascular:  Regular rhythm, normal heart sounds and intact distal pulses.           No murmur heard.  Tachycardic at 126 beats per minute   Pulmonary/Chest: Breath sounds normal. No respiratory distress. She has no wheezes. She has no rhonchi. She has no rales. She exhibits no tenderness.   Abdominal: Abdomen is soft. Bowel sounds are normal.   Musculoskeletal:         General: No tenderness or edema. Normal range of motion.      Cervical back: Normal range of motion and neck supple.     Neurological: She is alert and oriented to person, place, and time. She has normal strength. No cranial nerve deficit. GCS score is 15. GCS eye subscore is 4. GCS verbal subscore is 5. GCS motor subscore is 6.   Skin: Skin is warm and dry. Capillary refill takes less than 2 seconds.       ED Course   Procedures  Labs Reviewed   GROUP A STREP, MOLECULAR - Abnormal; Notable for the following components:       Result Value    Group A Strep, Molecular Positive (*)     All other components within normal limits   CBC W/ AUTO DIFFERENTIAL - Abnormal; Notable for the following components:    WBC 20.21 (*)     RBC 2.89 (*)     Hemoglobin 9.0 (*)     Hematocrit 25.5 (*)     MCH 31.1 (*)     RDW 15.6 (*)     Gran # (ANC) 15.9 (*)     Immature Grans (Abs) 0.10 (*)     Mono # 2.0 (*)     Gran % 78.8 (*)     Lymph % 10.3 (*)     All  other components within normal limits   RETICULOCYTES - Abnormal; Notable for the following components:    Retic 12.9 (*)     All other components within normal limits   COMPREHENSIVE METABOLIC PANEL - Abnormal; Notable for the following components:    Potassium 3.1 (*)     BUN 5 (*)     Calcium 8.6 (*)     Total Protein 9.0 (*)     Total Bilirubin 7.0 (*)     AST 43 (*)     Anion Gap 7 (*)     All other components within normal limits   URINALYSIS, REFLEX TO URINE CULTURE - Abnormal; Notable for the following components:    Appearance, UA Cloudy (*)     Protein, UA 1+ (*)     Ketones, UA 1+ (*)     Bilirubin (UA) 1+ (*)     Urobilinogen, UA 4.0-6.0 (*)     Leukocytes, UA Trace (*)     All other components within normal limits    Narrative:     Preferred Collection Type->Urine, Clean Catch  Specimen Source->Urine   PHOSPHORUS - Abnormal; Notable for the following components:    Phosphorus 2.5 (*)     All other components within normal limits   URINALYSIS MICROSCOPIC - Abnormal; Notable for the following components:    Hyaline Casts, UA 1.9 (*)     All other components within normal limits    Narrative:     Preferred Collection Type->Urine, Clean Catch  Specimen Source->Urine   CULTURE, BLOOD   CULTURE, BLOOD   LACTIC ACID, PLASMA   MAGNESIUM   LACTIC ACID, PLASMA   PROCALCITONIN   POCT INFLUENZA A/B MOLECULAR   SARS-COV-2 RDRP GENE          Imaging Results              X-Ray Chest 1 View (Final result)  Result time 10/22/22 11:06:28      Final result by Marcelina Goodman MD (10/22/22 11:06:28)                   Impression:      No acute findings      Electronically signed by: Marcelina Goodman MD  Date:    10/22/2022  Time:    11:06               Narrative:    EXAMINATION:  XR CHEST 1 VIEW    CLINICAL HISTORY:  Fever, unspecified    FINDINGS:  No acute infiltrates or signs of CHF.  No change from prior                                       Medications   cefTRIAXone (ROCEPHIN) 2 g/50 mL D5W IVPB (2 g Intravenous New  "Bag 10/22/22 1110)   lactated ringers bolus 2,109 mL (2,109 mLs Intravenous New Bag 10/22/22 1038)   acetaminophen tablet 1,000 mg (1,000 mg Oral Given 10/22/22 1027)   ibuprofen tablet 800 mg (800 mg Oral Given 10/22/22 1027)   dexAMETHasone injection 8 mg (8 mg Intravenous Given 10/22/22 1027)     Medical Decision Making:   Differential Diagnosis:   Pharyngitis, viral syndrome, sickle cell anemia, fever  Clinical Tests:   Sepsis Perfusion Assessment: "I attest a sepsis perfusion exam was performed within 6 hours of sepsis, severe sepsis, or septic shock presentation, following fluid resuscitation."    Sepsis Perfusion Assessment Complete: 10/22/2022 11:10 AM             ED Course as of 10/22/22 1134   Sat Oct 22, 2022   1108 Sepsis post fluid bolus initiation tissue perfusion exam:    See current vital signs.    Cardiopulmonary:   Heart: Regular rate and rhythm   Lungs: Clear to auscultation bilaterally    Capillary refill: < 3 seconds  Peripheral pulses: radial 2+ bilaterally  Skin: warm/dry/pink with good turgor      [SD]   1131 Patient much improved after fluids and an deeper retics.  She is strep positive here.  Will give IV Rocephin, as well as discharge with p.o. antibiotics.  Strict ER precautions given for any worsening [SD]      ED Course User Index  [SD] Anjel Melgar MD                 Clinical Impression:   Final diagnoses:  [R50.9] Fever  [J02.0] Strep pharyngitis (Primary)      ED Disposition Condition    Discharge Stable          ED Prescriptions       Medication Sig Dispense Start Date End Date Auth. Provider    amoxicillin-clavulanate 875-125mg (AUGMENTIN) 875-125 mg per tablet Take 1 tablet by mouth 2 (two) times daily. for 7 days 14 tablet 10/22/2022 10/29/2022 Anjel Melgar MD          Follow-up Information       Follow up With Specialties Details Why Contact Info Additional Information    Primary care physician  In 2 days       [facility] Department Emergency Medicine  If " symptoms worsen, As needed [address] [facility] [address]  [phone] Floor 1             Anjel Melgar MD  10/22/22 1135

## 2022-10-22 NOTE — Clinical Note
"Boni Yeh" Gerald was seen and treated in our emergency department on 10/22/2022.  She may return to work on 10/25/2022.       If you have any questions or concerns, please don't hesitate to call.      Anjel Melgar MD"

## 2022-10-24 ENCOUNTER — SPECIALTY PHARMACY (OUTPATIENT)
Dept: PHARMACY | Facility: CLINIC | Age: 32
End: 2022-10-24
Payer: MEDICAID

## 2022-10-24 NOTE — TELEPHONE ENCOUNTER
Outgoing call to pt. Pt reports having 3 days of Jadenu on hand. Refill request sent to Dr. Holguin.

## 2022-10-25 NOTE — TELEPHONE ENCOUNTER
Specialty Pharmacy - Refill Coordination    Specialty Medication Orders Linked to Encounter      Flowsheet Row Most Recent Value   Medication #1 deferasirox (JADENU) 90 mg Tab (Order#[id number], Rx#[id number])   Medication #2 deferasirox (JADENU) 360 mg Tab (Order#[id number], Rx#[id number])          Pt treated in ED for strep infection. Discharged or PO Augmentin. No DDI with Jadenu.     Refill Questions - Documented Responses      Flowsheet Row Most Recent Value   Patient Availability and HIPAA Verification    Does patient want to proceed with activity? Yes   HIPAA/medical authority confirmed? Yes   Relationship to patient of person spoken to? Self   Refill Screening Questions    Changes to allergies? No   Changes to medications? Yes  [Augmentin for strep, no DDI]   New conditions since last clinic visit? No   Unplanned office visit, urgent care, ED, or hospital admission in the last 4 weeks? Yes  [ED for strep infection]   How does patient/caregiver feel medication is working? Good   Financial problems or insurance changes? No   How many doses of your specialty medications were missed in the last 4 weeks? 0   Would patient like to speak to a pharmacist? No   When does the patient need to receive the medication? 10/27/22   Refill Delivery Questions    How will the patient receive the medication? [facility]   When does the patient need to receive the medication? 10/27/22   Shipping Address Home   Address in [facility] confirmed and updated if neccessary? Yes   Expected Copay ($) 0   Is the patient able to afford the medication copay? Yes   Payment Method zero copay   Days supply of Refill 30   Supplies needed? No supplies needed   Refill activity completed? Yes   Refill activity plan Refill scheduled   Shipment/Pickup Date: 10/26/22            Current Outpatient Medications   Medication Sig    amoxicillin-clavulanate 875-125mg (AUGMENTIN) 875-125 mg per tablet Take 1 tablet by mouth 2 (two) times daily. for 7 days     deferasirox (JADENU) 360 mg Tab Take 1 tablet (360 mg total) by mouth every other day. Take with 90mg tablet for total dose of 450mg    deferasirox (JADENU) 90 mg Tab Take 1 tablet (90 mg) by mouth every other day. Take with 360mg tablet for total dose of 450mg    folic acid (FOLVITE) 1 MG tablet Take one pill by mouth daily.    hydroxyurea (HYDREA) 500 mg Cap Take 1 capsule (500 mg total) by mouth once daily.   Last reviewed on 9/2/2022 10:17 AM by Johanna Desir MA    Review of patient's allergies indicates:  No Known Allergies Last reviewed on  10/24/2022 2:49 PM by Meng Holguin      Tasks added this encounter   11/19/2022 - Refill Call (Auto Added)   Tasks due within next 3 months   1/14/2023 - Clinical - Follow Up Assesement (Annual)     Cindy Ponce, PharmD  [facility]  [address] [place], [place] [address]  [facility] [address]  Phone: [phone]  Fax: [phone]

## 2022-10-25 NOTE — TELEPHONE ENCOUNTER
Outgoing call regarding Jandeu refill. Pt stated she went to ER and was given Augmentin for Strepp. Pt stated she has also been running fever off and on. Transferred to [facility]

## 2022-10-26 ENCOUNTER — PATIENT OUTREACH (OUTPATIENT)
Dept: EMERGENCY MEDICINE | Facility: HOSPITAL | Age: 32
End: 2022-10-26
Payer: MEDICAID

## 2022-10-26 NOTE — PROGRESS NOTES
Patient declined assistance making a follow-up appointment with her PCP at this time. Patient declined ED navigation assessment and denied any needs/resources at this time.       Anitha Rivera  ED Navigator- [place]  [phone]

## 2022-10-27 LAB
BACTERIA BLD CULT: NORMAL
BACTERIA BLD CULT: NORMAL

## 2022-11-25 ENCOUNTER — SPECIALTY PHARMACY (OUTPATIENT)
Dept: PHARMACY | Facility: CLINIC | Age: 32
End: 2022-11-25
Payer: MEDICAID

## 2022-11-25 NOTE — TELEPHONE ENCOUNTER
Specialty Pharmacy - Refill Coordination    Specialty Medication Orders Linked to Encounter      Flowsheet Row Most Recent Value   Medication #1 deferasirox (JADENU) 360 mg Tab (Order#[id number], Rx#[id number])   Medication #2 deferasirox (JADENU) 90 mg Tab (Order#[id number], Rx#[id number])            Refill Questions - Documented Responses      Flowsheet Row Most Recent Value   Patient Availability and HIPAA Verification    Does patient want to proceed with activity? Yes   HIPAA/medical authority confirmed? Yes   Relationship to patient of person spoken to? Self   Refill Screening Questions    Changes to allergies? No   Changes to medications? No   New conditions since last clinic visit? No   Unplanned office visit, urgent care, ED, or hospital admission in the last 4 weeks? No   How does patient/caregiver feel medication is working? Good   Financial problems or insurance changes? No   How many doses of your specialty medications were missed in the last 4 weeks? 0   Would patient like to speak to a pharmacist? No   When does the patient need to receive the medication? 11/29/22   Refill Delivery Questions    How will the patient receive the medication? MEDRx   When does the patient need to receive the medication? 11/29/22   Shipping Address Home   Address in [facility] confirmed and updated if neccessary? Yes   Expected Copay ($) 0   Is the patient able to afford the medication copay? Yes   Payment Method zero copay   Days supply of Refill 30   Supplies needed? No supplies needed   Refill activity completed? Yes   Refill activity plan Refill scheduled   Shipment/Pickup Date: 11/25/22            Current Outpatient Medications   Medication Sig    deferasirox (JADENU) 360 mg Tab Take 1 tablet (360 mg total) by mouth every other day. Take with 90mg tablet for total dose of 450mg    deferasirox (JADENU) 90 mg Tab Take 1 tablet (90 mg) by mouth every other day. Take with 360mg tablet for total dose of 450mg     folic acid (FOLVITE) 1 MG tablet Take one pill by mouth daily.    hydroxyurea (HYDREA) 500 mg Cap Take 1 capsule (500 mg total) by mouth once daily.   Last reviewed on 11/2/2022  1:55 PM by Trisha Mcmahon RN    Review of patient's allergies indicates:  No Known Allergies Last reviewed on  11/2/2022 1:54 PM by Trisha Mcmahon      Tasks added this encounter   12/22/2022 - Refill Call (Auto Added)   Tasks due within next 3 months   1/14/2023 - Clinical - Follow Up Assesement (Annual)     [facility]  [address]  [facility] [address]  Phone: [phone]  Fax: [phone]

## 2022-12-22 ENCOUNTER — SPECIALTY PHARMACY (OUTPATIENT)
Dept: PHARMACY | Facility: CLINIC | Age: 32
End: 2022-12-22
Payer: MEDICAID

## 2022-12-22 NOTE — TELEPHONE ENCOUNTER
Outgoing calling regarding Jadenu refills, informed pt contacting provider for refill approval and will follow up once provider respond

## 2022-12-27 NOTE — TELEPHONE ENCOUNTER
Specialty Pharmacy - Refill Coordination    Specialty Medication Orders Linked to Encounter      Flowsheet Row Most Recent Value   Medication #1 deferasirox (JADENU) 360 mg Tab (Order#[id number], Rx#[id number])   Medication #2 deferasirox (JADENU) 90 mg Tab (Order#[id number], Rx#[id number])            Refill Questions - Documented Responses      Flowsheet Row Most Recent Value   Patient Availability and HIPAA Verification    Does patient want to proceed with activity? Yes   HIPAA/medical authority confirmed? Yes   Relationship to patient of person spoken to? Self   Refill Screening Questions    Changes to allergies? No   Changes to medications? No   New conditions since last clinic visit? No   Unplanned office visit, urgent care, ED, or hospital admission in the last 4 weeks? No   How does patient/caregiver feel medication is working? Good   Financial problems or insurance changes? No   How many doses of your specialty medications were missed in the last 4 weeks? 1   Would patient like to speak to a pharmacist? No   When does the patient need to receive the medication? 12/29/22   Refill Delivery Questions    How will the patient receive the medication? MEDRx   When does the patient need to receive the medication? 12/29/22   Shipping Address Home   Address in [facility] confirmed and updated if neccessary? Yes   Expected Copay ($) 0   Is the patient able to afford the medication copay? Yes   Payment Method zero copay   Days supply of Refill 30   Supplies needed? No supplies needed   Refill activity completed? Yes   Refill activity plan Refill scheduled   Shipment/Pickup Date: 12/28/22            Current Outpatient Medications   Medication Sig    deferasirox (JADENU) 360 mg Tab Take 1 tablet (360 mg total) by mouth every other day. Take with 90mg tablet for total dose of 450mg    deferasirox (JADENU) 90 mg Tab Take 1 tablet (90 mg) by mouth every other day. Take with 360mg tablet for total dose of 450mg     folic acid (FOLVITE) 1 MG tablet Take one pill by mouth daily.    hydroxyurea (HYDREA) 500 mg Cap Take 1 capsule (500 mg total) by mouth once daily.   Last reviewed on 12/9/2022 12:55 PM by Cal Clay MD    Review of patient's allergies indicates:  No Known Allergies Last reviewed on  12/9/2022 12:55 PM by Cal Clay      Tasks added this encounter   1/21/2023 - Refill Call (Auto Added)   Tasks due within next 3 months   1/14/2023 - Clinical - Follow Up Assesement (Annual)     Selam Rossi, Patient Care Assistant  [facility]  [address]  [facility] [address]  Phone: [phone]  Fax: [phone]

## 2023-01-23 ENCOUNTER — PATIENT MESSAGE (OUTPATIENT)
Dept: PHARMACY | Facility: CLINIC | Age: 33
End: 2023-01-23
Payer: MEDICAID

## 2023-01-26 ENCOUNTER — SPECIALTY PHARMACY (OUTPATIENT)
Dept: PHARMACY | Facility: CLINIC | Age: 33
End: 2023-01-26
Payer: MEDICAID

## 2023-01-26 NOTE — TELEPHONE ENCOUNTER
Outgoing call regarding Jadenu refill, informed pt needs to contact the insurance company. Pt states she has a few days on hand, asked if she has enough til Tuesaday, pt stated yes. Rejection states PRESCRIBER NOT ENROLLED IN PROVIDER  PORTAL. Routed to assigned [facility]

## 2023-01-27 NOTE — TELEPHONE ENCOUNTER
Specialty Pharmacy - Refill Coordination    Specialty Medication Orders Linked to Encounter      Flowsheet Row Most Recent Value   Medication #1 deferasirox (JADENU) 360 mg Tab (Order#[id number], Rx#[id number])   Medication #2 deferasirox (JADENU) 90 mg Tab (Order#[id number], Rx#[id number])            Refill Questions - Documented Responses      Flowsheet Row Most Recent Value   Patient Availability and HIPAA Verification    Does patient want to proceed with activity? Yes   HIPAA/medical authority confirmed? Yes   Relationship to patient of person spoken to? Self   Refill Screening Questions    Changes to allergies? No   Changes to medications? No   New conditions since last clinic visit? No   Unplanned office visit, urgent care, ED, or hospital admission in the last 4 weeks? No   How does patient/caregiver feel medication is working? Good   Financial problems or insurance changes? No   How many doses of your specialty medications were missed in the last 4 weeks? 1   Would patient like to speak to a pharmacist? No   When does the patient need to receive the medication? 01/31/23   Refill Delivery Questions    How will the patient receive the medication? MEDRx   When does the patient need to receive the medication? 01/31/23   Shipping Address Home   Address in [facility] confirmed and updated if neccessary? Yes   Expected Copay ($) 0   Is the patient able to afford the medication copay? Yes   Payment Method zero copay   Days supply of Refill 30   Supplies needed? No supplies needed   Refill activity completed? Yes   Refill activity plan Refill scheduled   Shipment/Pickup Date: 01/30/23            Current Outpatient Medications   Medication Sig    deferasirox (JADENU) 360 mg Tab Take 1 tablet (360 mg total) by mouth every other day. Take with 90mg tablet for total dose of 450mg    deferasirox (JADENU) 90 mg Tab Take 1 tablet (90 mg) by mouth every other day. Take with 360mg tablet for total dose of 450mg     folic acid (FOLVITE) 1 MG tablet Take one pill by mouth daily.    hydroxyurea (HYDREA) 500 mg Cap Take 1 capsule (500 mg total) by mouth once daily.   Last reviewed on 12/9/2022 12:55 PM by Cal Clay MD    Review of patient's allergies indicates:  No Known Allergies Last reviewed on  12/9/2022 12:55 PM by Cal Clay      Tasks added this encounter   2/23/2023 - Refill Call (Auto Added)   Tasks due within next 3 months   No tasks due.     Francy Issa, PharmD  [facility] - [facility] Pharm[facility]  [address] [facility] [address]  Phone: [phone]  Fax: [phone]

## 2023-02-23 ENCOUNTER — SPECIALTY PHARMACY (OUTPATIENT)
Dept: PHARMACY | Facility: CLINIC | Age: 33
End: 2023-02-23
Payer: MEDICAID

## 2023-02-23 NOTE — TELEPHONE ENCOUNTER
Outgoing call regarding Jadenu refill, pt states she has a week and a half of medication on hand. Informed pt contacting provider for refill approval. And will follow up once provider respond

## 2023-02-27 NOTE — TELEPHONE ENCOUNTER
Specialty Pharmacy - Refill Coordination    Specialty Medication Orders Linked to Encounter      Flowsheet Row Most Recent Value   Medication #1 deferasirox (JADENU) 360 mg Tab (Order#[id number], Rx#[id number])   Medication #2 deferasirox (JADENU) 90 mg Tab (Order#[id number], Rx#[id number])            Refill Questions - Documented Responses      Flowsheet Row Most Recent Value   Patient Availability and HIPAA Verification    Does patient want to proceed with activity? Yes   HIPAA/medical authority confirmed? Yes   Relationship to patient of person spoken to? Self   Refill Screening Questions    Changes to allergies? No   Changes to medications? No   Unplanned office visit, urgent care, ED, or hospital admission in the last 4 weeks? No   How does patient/caregiver feel medication is working? Good   Financial problems or insurance changes? No   How many doses of your specialty medications were missed in the last 4 weeks? 1   Why were doses missed? Simply forgot   Would patient like to speak to a pharmacist? No   When does the patient need to receive the medication? 03/03/23   Refill Delivery Questions    How will the patient receive the medication? MEDRx   When does the patient need to receive the medication? 03/03/23   Shipping Address Home   Address in [facility] confirmed and updated if neccessary? Yes   Expected Copay ($) 0   Is the patient able to afford the medication copay? Yes   Payment Method zero copay   Days supply of Refill 30   Supplies needed? No supplies needed   Refill activity completed? Yes   Refill activity plan Refill scheduled   Shipment/Pickup Date: 02/28/23            Current Outpatient Medications   Medication Sig    deferasirox (JADENU) 360 mg Tab Take 1 tablet (360 mg total) by mouth every other day. Take with 90mg tablet for total dose of 450mg    deferasirox (JADENU) 90 mg Tab Take 1 tablet (90 mg) by mouth every other day. Take with 360mg tablet for total dose of 450mg    folic acid  (FOLVITE) 1 MG tablet Take one pill by mouth daily.    hydroxyurea (HYDREA) 500 mg Cap Take 1 capsule (500 mg total) by mouth once daily.   Last reviewed on 12/9/2022 12:55 PM by Cal Clay MD    Review of patient's allergies indicates:  No Known Allergies Last reviewed on  12/9/2022 12:55 PM by Cal Clay      Tasks added this encounter   3/26/2023 - Refill Call (Auto Added)   Tasks due within next 3 months   No tasks due.     Iliana Dozier  [facility] - Specialty Pharmacy  [address] [address]  Phone: [phone]  Fax: [phone]

## 2023-03-27 ENCOUNTER — SPECIALTY PHARMACY (OUTPATIENT)
Dept: PHARMACY | Facility: CLINIC | Age: 33
End: 2023-03-27
Payer: MEDICAID

## 2023-03-27 NOTE — TELEPHONE ENCOUNTER
Outgoing regarding Jadenu refill, informed pt contacting provider for refill approval. Pt states she has 3 days on hand. Informed pt will follow up once provider respond.

## 2023-04-04 NOTE — TELEPHONE ENCOUNTER
Specialty Pharmacy - Refill Coordination    Specialty Medication Orders Linked to Encounter      Flowsheet Row Most Recent Value   Medication #1 deferasirox (JADENU) 90 mg Tab (Order#[id number], Rx#[id number])   Medication #2 deferasirox (JADENU) 360 mg Tab (Order#[id number], Rx#[id number])            Refill Questions - Documented Responses      Flowsheet Row Most Recent Value   Patient Availability and HIPAA Verification    Does patient want to proceed with activity? Yes   HIPAA/medical authority confirmed? Yes   Relationship to patient of person spoken to? Self   Refill Screening Questions    Changes to allergies? No   Changes to medications? No   New conditions since last clinic visit? No   Unplanned office visit, urgent care, ED, or hospital admission in the last 4 weeks? No   How does patient/caregiver feel medication is working? Good   Financial problems or insurance changes? No   How many doses of your specialty medications were missed in the last 4 weeks? 1   Would patient like to speak to a pharmacist? No   When does the patient need to receive the medication? 04/05/23   Refill Delivery Questions    How will the patient receive the medication? MEDRx   When does the patient need to receive the medication? 04/05/23   Shipping Address Home   Address in [facility] confirmed and updated if neccessary? Yes   Expected Copay ($) 0   Is the patient able to afford the medication copay? Yes   Payment Method zero copay   Days supply of Refill 30   Supplies needed? No supplies needed   Refill activity completed? Yes   Refill activity plan Refill scheduled   Shipment/Pickup Date: 04/04/23            Current Outpatient Medications   Medication Sig    deferasirox (JADENU) 360 mg Tab Take 1 tablet (360 mg total) by mouth every other day. Take with 90mg tablet for total dose of 450mg    deferasirox (JADENU) 90 mg Tab Take 1 tablet (90 mg) by mouth every other day. Take with 360mg tablet for total dose of 450mg     folic acid (FOLVITE) 1 MG tablet Take one pill by mouth daily.    hydroxyurea (HYDREA) 500 mg Cap Take 1 capsule (500 mg total) by mouth once daily.   Last reviewed on 12/9/2022 12:55 PM by Cal Clay MD    Review of patient's allergies indicates:  No Known Allergies Last reviewed on  12/9/2022 12:55 PM by Cal Clay      Tasks added this encounter   4/28/2023 - Refill Call (Auto Added)   Tasks due within next 3 months   No tasks due.     Rima William  [facility] - Specialty Pharmacy  [address] [address]  Phone: [phone]  Fax: [phone]

## 2023-04-28 ENCOUNTER — SPECIALTY PHARMACY (OUTPATIENT)
Dept: PHARMACY | Facility: CLINIC | Age: 33
End: 2023-04-28
Payer: MEDICAID

## 2023-04-28 NOTE — TELEPHONE ENCOUNTER
Outgoing call regarding Jadenu refill, informed pt contacting provider for refill approval. Pt states she has a few day on hand. Informed pt will follow up once provider respond.

## 2023-05-01 NOTE — TELEPHONE ENCOUNTER
Specialty Pharmacy - Refill Coordination    Specialty Medication Orders Linked to Encounter      Flowsheet Row Most Recent Value   Medication #1 deferasirox (JADENU) 90 mg Tab (Order#[id number], Rx#[id number])   Medication #2 deferasirox (JADENU) 360 mg Tab (Order#[id number], Rx#[id number])            Refill Questions - Documented Responses      Flowsheet Row Most Recent Value   Refill Screening Questions    Changes to allergies? No   Changes to medications? No   New conditions since last clinic visit? No   Unplanned office visit, urgent care, ED, or hospital admission in the last 4 weeks? No   Financial problems or insurance changes? No   How many doses of your specialty medications were missed in the last 4 weeks? 0   Would patient like to speak to a pharmacist? No   When does the patient need to receive the medication? 05/02/23   Refill Delivery Questions    How will the patient receive the medication? MEDRx   When does the patient need to receive the medication? 05/02/23   Shipping Address Home   Address in [facility] confirmed and updated if neccessary? Yes   Expected Copay ($) 0   Is the patient able to afford the medication copay? Yes   Payment Method zero copay   Days supply of Refill 30   Supplies needed? No supplies needed   Refill activity completed? Yes   Refill activity plan Refill scheduled   Shipment/Pickup Date: 05/01/23            Current Outpatient Medications   Medication Sig    deferasirox (JADENU) 360 mg Tab Take 1 tablet (360 mg total) by mouth every other day. Take with 90mg tablet for total dose of 450mg    deferasirox (JADENU) 90 mg Tab Take 1 tablet (90 mg) by mouth every other day. Take with 360mg tablet for total dose of 450mg    folic acid (FOLVITE) 1 MG tablet Take one pill by mouth daily.    hydroxyurea (HYDREA) 500 mg Cap Take 1 capsule (500 mg total) by mouth once daily.   Last reviewed on 12/9/2022 12:55 PM by Cal Clay MD    Review of patient's allergies  indicates:  No Known Allergies Last reviewed on  12/9/2022 12:55 PM by Cal Clay      Tasks added this encounter   No tasks added.   Tasks due within next 3 months   4/30/2023 - Refill Coordination Outreach (1 time occurrence)     [facility]  [address]  [facility] [address]  Phone: [phone]  Fax: [phone]

## 2023-05-25 ENCOUNTER — PATIENT MESSAGE (OUTPATIENT)
Dept: PHARMACY | Facility: CLINIC | Age: 33
End: 2023-05-25
Payer: MEDICAID

## 2023-05-30 ENCOUNTER — PATIENT MESSAGE (OUTPATIENT)
Dept: PHARMACY | Facility: CLINIC | Age: 33
End: 2023-05-30
Payer: MEDICAID

## 2023-06-05 ENCOUNTER — SPECIALTY PHARMACY (OUTPATIENT)
Dept: PHARMACY | Facility: CLINIC | Age: 33
End: 2023-06-05
Payer: MEDICAID

## 2023-06-05 NOTE — TELEPHONE ENCOUNTER
Spoke with patient and she confirmed that she was instructed to hold her Jadenu after seeing Dr. Holguin on 5/24. Confirmed in provider notes that pt was instructed to hold for 1 month. She will follow up with Dr. Holguin again on 6/22 to see how to proceed.Ferritin level at 300 and controlled. Will close out if Jadenu no longer needed after follow up appt on 6/22.

## 2023-06-25 ENCOUNTER — PATIENT MESSAGE (OUTPATIENT)
Dept: OBSTETRICS AND GYNECOLOGY | Facility: HOSPITAL | Age: 33
End: 2023-06-25
Payer: MEDICAID

## 2025-02-17 ENCOUNTER — LAB VISIT (OUTPATIENT)
Dept: LAB | Facility: HOSPITAL | Age: 35
End: 2025-02-17
Attending: OBSTETRICS & GYNECOLOGY
Payer: MEDICAID

## 2025-02-17 ENCOUNTER — OFFICE VISIT (OUTPATIENT)
Dept: OBSTETRICS AND GYNECOLOGY | Facility: CLINIC | Age: 35
End: 2025-02-17
Payer: MEDICAID

## 2025-02-17 VITALS
DIASTOLIC BLOOD PRESSURE: 80 MMHG | HEIGHT: 67 IN | WEIGHT: 172 LBS | BODY MASS INDEX: 27 KG/M2 | SYSTOLIC BLOOD PRESSURE: 126 MMHG | HEART RATE: 82 BPM

## 2025-02-17 DIAGNOSIS — Z34.82 ENCOUNTER FOR SUPERVISION OF OTHER NORMAL PREGNANCY IN SECOND TRIMESTER: ICD-10-CM

## 2025-02-17 DIAGNOSIS — Z36.9 VISIT FOR PRENATAL SCREENING: ICD-10-CM

## 2025-02-17 DIAGNOSIS — Z36.89 SCREENING, ANTENATAL, FOR FETAL ANATOMIC SURVEY: ICD-10-CM

## 2025-02-17 DIAGNOSIS — D57.1 SICKLE CELL DISEASE WITHOUT CRISIS: ICD-10-CM

## 2025-02-17 DIAGNOSIS — Z3A.15 15 WEEKS GESTATION OF PREGNANCY: ICD-10-CM

## 2025-02-17 DIAGNOSIS — Z36.9 VISIT FOR PRENATAL SCREENING: Primary | ICD-10-CM

## 2025-02-17 LAB
ABO + RH BLD: NORMAL
BASOPHILS # BLD AUTO: 0.05 K/UL (ref 0–0.2)
BASOPHILS NFR BLD: 0.3 % (ref 0–1.9)
BILIRUB UR QL STRIP: NEGATIVE
BLD GP AB SCN CELLS X3 SERPL QL: NORMAL
CLARITY UR: ABNORMAL
COLOR UR: YELLOW
DIFFERENTIAL METHOD BLD: ABNORMAL
EOSINOPHIL # BLD AUTO: 0.4 K/UL (ref 0–0.5)
EOSINOPHIL NFR BLD: 2.4 % (ref 0–8)
ERYTHROCYTE [DISTWIDTH] IN BLOOD BY AUTOMATED COUNT: 16.7 % (ref 11.5–14.5)
GLUCOSE UR QL STRIP: NEGATIVE
HCT VFR BLD AUTO: 23.4 % (ref 37–48.5)
HCV AB SERPL QL IA: NORMAL
HGB BLD-MCNC: 8.5 G/DL (ref 12–16)
HGB UR QL STRIP: NEGATIVE
HIV 1+2 AB+HIV1 P24 AG SERPL QL IA: NORMAL
IMM GRANULOCYTES # BLD AUTO: 0.14 K/UL (ref 0–0.04)
IMM GRANULOCYTES NFR BLD AUTO: 0.9 % (ref 0–0.5)
KETONES UR QL STRIP: NEGATIVE
LEUKOCYTE ESTERASE UR QL STRIP: ABNORMAL
LYMPHOCYTES # BLD AUTO: 2.8 K/UL (ref 1–4.8)
LYMPHOCYTES NFR BLD: 18.5 % (ref 18–48)
MCH RBC QN AUTO: 31.6 PG (ref 27–31)
MCHC RBC AUTO-ENTMCNC: 36.3 G/DL (ref 32–36)
MCV RBC AUTO: 87 FL (ref 82–98)
MONOCYTES # BLD AUTO: 1.6 K/UL (ref 0.3–1)
MONOCYTES NFR BLD: 10.5 % (ref 4–15)
NEUTROPHILS # BLD AUTO: 10.3 K/UL (ref 1.8–7.7)
NEUTROPHILS NFR BLD: 67.4 % (ref 38–73)
NITRITE UR QL STRIP: NEGATIVE
NRBC BLD-RTO: 1 /100 WBC
PH UR STRIP: 7 [PH] (ref 5–8)
PLATELET # BLD AUTO: 427 K/UL (ref 150–450)
PMV BLD AUTO: 9.9 FL (ref 9.2–12.9)
PROT UR QL STRIP: ABNORMAL
RBC # BLD AUTO: 2.69 M/UL (ref 4–5.4)
SP GR UR STRIP: 1 (ref 1–1.03)
SPECIMEN OUTDATE: NORMAL
T4 SERPL-MCNC: 10.1 UG/DL (ref 4.5–11.5)
THYROPEROXIDASE IGG SERPL-ACNC: <6 IU/ML
TSH SERPL DL<=0.005 MIU/L-ACNC: 2.67 UIU/ML (ref 0.4–4)
URN SPEC COLLECT METH UR: ABNORMAL
UROBILINOGEN UR STRIP-ACNC: NEGATIVE EU/DL
WBC # BLD AUTO: 15.26 K/UL (ref 3.9–12.7)

## 2025-02-17 PROCEDURE — 84481 FREE ASSAY (FT-3): CPT | Performed by: OBSTETRICS & GYNECOLOGY

## 2025-02-17 PROCEDURE — 87661 TRICHOMONAS VAGINALIS AMPLIF: CPT | Performed by: OBSTETRICS & GYNECOLOGY

## 2025-02-17 PROCEDURE — 81000 URINALYSIS NONAUTO W/SCOPE: CPT | Performed by: OBSTETRICS & GYNECOLOGY

## 2025-02-17 PROCEDURE — 86803 HEPATITIS C AB TEST: CPT | Performed by: OBSTETRICS & GYNECOLOGY

## 2025-02-17 PROCEDURE — 87086 URINE CULTURE/COLONY COUNT: CPT | Performed by: OBSTETRICS & GYNECOLOGY

## 2025-02-17 PROCEDURE — 36415 COLL VENOUS BLD VENIPUNCTURE: CPT | Performed by: OBSTETRICS & GYNECOLOGY

## 2025-02-17 PROCEDURE — 87389 HIV-1 AG W/HIV-1&-2 AB AG IA: CPT | Performed by: OBSTETRICS & GYNECOLOGY

## 2025-02-17 PROCEDURE — 86850 RBC ANTIBODY SCREEN: CPT | Performed by: OBSTETRICS & GYNECOLOGY

## 2025-02-17 PROCEDURE — 84443 ASSAY THYROID STIM HORMONE: CPT | Performed by: OBSTETRICS & GYNECOLOGY

## 2025-02-17 PROCEDURE — 99999 PR PBB SHADOW E&M-EST. PATIENT-LVL III: CPT | Mod: PBBFAC,,, | Performed by: OBSTETRICS & GYNECOLOGY

## 2025-02-17 PROCEDURE — 87340 HEPATITIS B SURFACE AG IA: CPT | Performed by: OBSTETRICS & GYNECOLOGY

## 2025-02-17 PROCEDURE — 86376 MICROSOMAL ANTIBODY EACH: CPT | Performed by: OBSTETRICS & GYNECOLOGY

## 2025-02-17 PROCEDURE — 86593 SYPHILIS TEST NON-TREP QUANT: CPT | Performed by: OBSTETRICS & GYNECOLOGY

## 2025-02-17 PROCEDURE — 86787 VARICELLA-ZOSTER ANTIBODY: CPT | Performed by: OBSTETRICS & GYNECOLOGY

## 2025-02-17 PROCEDURE — 86762 RUBELLA ANTIBODY: CPT | Performed by: OBSTETRICS & GYNECOLOGY

## 2025-02-17 PROCEDURE — 84436 ASSAY OF TOTAL THYROXINE: CPT | Performed by: OBSTETRICS & GYNECOLOGY

## 2025-02-17 PROCEDURE — 85025 COMPLETE CBC W/AUTO DIFF WBC: CPT | Performed by: OBSTETRICS & GYNECOLOGY

## 2025-02-17 PROCEDURE — 99213 OFFICE O/P EST LOW 20 MIN: CPT | Mod: PBBFAC,TH | Performed by: OBSTETRICS & GYNECOLOGY

## 2025-02-17 RX ORDER — PRENATAL WITH FERROUS FUM AND FOLIC ACID 3080; 920; 120; 400; 22; 1.84; 3; 20; 10; 1; 12; 200; 27; 25; 2 [IU]/1; [IU]/1; MG/1; [IU]/1; MG/1; MG/1; MG/1; MG/1; MG/1; MG/1; UG/1; MG/1; MG/1; MG/1; MG/1
1 TABLET ORAL DAILY
Start: 2025-02-17 | End: 2026-02-17

## 2025-02-17 NOTE — PROGRESS NOTES
Subjective:    Patient ID: Boni Duarte is a 34 y.o. y.o. female    Chief Complaint:   Chief Complaint   Patient presents with    Possible Pregnancy     Here for pregnancy confirmation. States she had some spotting but has not had any today.        History of Present Illness:  Boni presents today for  confirmation  of pregnancy.  Patient reports that she had some spotting for the last few days but has not had any today.  She reports no nausea or vomiting.  She is taking over-the-counter prenatal vitamins      Review of Systems   Constitutional:  Positive for fatigue. Negative for chills and fever.   Respiratory:  Negative for shortness of breath.    Cardiovascular:  Negative for chest pain.   Gastrointestinal:  Negative for abdominal pain, constipation, diarrhea, nausea and vomiting.   Genitourinary:  Negative for bladder incontinence, dysuria, hot flashes, pelvic pain and vaginal bleeding.   Neurological:  Negative for headaches.   Psychiatric/Behavioral:  Negative for depression.          Objective:    Vital Signs:  Vitals:    02/17/25 1103   BP: 126/80   Pulse: 82     Wt Readings from Last 1 Encounters:   02/17/25 78 kg (172 lb)     Body mass index is 26.94 kg/m².    Physical Exam:  General:  alert,normal appearing gravid female   Skin:  Skin color, texture, turgor normal. No rashes or lesions   Neck: supple, trachea midline, no adenopathy or thyromegaly   Respiratory:  clear to auscultation bilaterally   Heart:  regular rate and rhythm, S1, S2 normal, no murmur, click, rub or gallop   Abdomen:  Soft, non-tender. Bowel sounds normal. No masses,  no organomegaly   Pelvis: External genitalia: normal general appearance  Urinary system: urethral meatus normal, bladder nontender  Vaginal: normal mucosa without prolapse or lesions  Cervix: normal appearance  Uterus: enlarged 16 weeks size  Adnexa: normal bimanual exam       Ultrasound:  Single viable intrauterine pregnancy with JOHN 08/10/2025.  .   Limited anatomy completed but appears within normal limits    Explained that the vaginal spotting could have been from a burst small vessel on the surface of the cervix.  Reassurance given as it has resolved.  All questions answered and precautions given    I spent a total of 30 minutes on the day of the visit.  This includes face to face time and non-face to face time preparing to see the patient (eg, review of tests), obtaining and/or reviewing separately obtained history, documenting clinical information in the electronic or other health record, independently interpreting results and communicating results to the patient/family/caregiver, or care coordinator.       Assessment:      1. Visit for prenatal screening    2. Screening, , for fetal anatomic survey    3. 15 weeks gestation of pregnancy    4. Encounter for supervision of other normal pregnancy in second trimester          Plan:      Visit for prenatal screening  -     CBC Auto Differential; Future; Expected date: 2025  -     Type & Screen; Future; Expected date: 2025  -     Hepatitis B Surface Antigen; Future; Expected date: 2025  -     Rubella Antibody, IgG; Future; Expected date: 2025  -     Treponema Pallidum (Syphillis) Antibody; Future; Expected date: 2025  -     HIV 1/2 Ag/Ab (4th Gen); Future; Expected date: 2025  -     Hepatitis C Antibody; Future; Expected date: 2025  -     TSH; Future; Expected date: 2025  -     Thyroid Peroxidase Antibody; Future; Expected date: 2025  -     T4; Future; Expected date: 2025  -     T3, Free; Future; Expected date: 2025  -     Varicella Zoster Antibody, IgG; Future; Expected date: 2025  -     Antibody titer; Future; Expected date: 2025  -     C. trachomatis/N. gonorrhoeae by AMP DNA Ochalejandrina; Urine  -     Urinalysis  -     Urine culture  -     Liquid-Based Pap Smear, Screening    Screening, , for fetal anatomic survey  -      [id number] Plus W/ Sca* T21, T18, T13 & Y + X; Future; Expected date: 02/17/2025    15 weeks gestation of pregnancy    Encounter for supervision of other normal pregnancy in second trimester    Other orders  -     PNV,calcium 72/iron/folic acid (PRENATAL VITAMIN) Tab; Take 1 tablet by mouth once daily.           Mariela Pathak MD, FACOG   02/17/2025 11:33 AM

## 2025-02-18 ENCOUNTER — RESULTS FOLLOW-UP (OUTPATIENT)
Dept: OBSTETRICS AND GYNECOLOGY | Facility: CLINIC | Age: 35
End: 2025-02-18
Payer: MEDICAID

## 2025-02-18 DIAGNOSIS — D57.1 SICKLE CELL DISEASE WITHOUT CRISIS: Primary | ICD-10-CM

## 2025-02-18 DIAGNOSIS — Z3A.15 15 WEEKS GESTATION OF PREGNANCY: ICD-10-CM

## 2025-02-18 LAB
BACTERIA #/AREA URNS HPF: ABNORMAL /HPF
HBV SURFACE AG SERPL QL IA: NORMAL
HYALINE CASTS #/AREA URNS LPF: 1.45 /LPF
MICROSCOPIC COMMENT: ABNORMAL
RBC #/AREA URNS HPF: 1 /HPF (ref 0–4)
RUBV IGG SER-ACNC: 64.4 IU/ML
RUBV IGG SER-IMP: REACTIVE
SQUAMOUS #/AREA URNS HPF: 14 /HPF
T3FREE SERPL-MCNC: 2.9 PG/ML (ref 2.3–4.2)
TREPONEMA PALLIDUM IGG+IGM AB [PRESENCE] IN SERUM OR PLASMA BY IMMUNOASSAY: NONREACTIVE
VARICELLA INTERPRETATION: POSITIVE
VARICELLA ZOSTER IGG: 10.6 S/CO
WBC #/AREA URNS HPF: 9 /HPF (ref 0–5)

## 2025-02-18 RX ORDER — FOLIC ACID 1 MG/1
1 TABLET ORAL DAILY
Qty: 100 TABLET | Refills: 3 | Status: SHIPPED | OUTPATIENT
Start: 2025-02-18 | End: 2026-02-18

## 2025-02-18 RX ORDER — FERROUS SULFATE 325(65) MG
325 TABLET, DELAYED RELEASE (ENTERIC COATED) ORAL
Qty: 90 TABLET | Refills: 6 | Status: SHIPPED | OUTPATIENT
Start: 2025-02-18

## 2025-02-19 LAB — BACTERIA UR CULT: NO GROWTH

## 2025-02-21 LAB
CHLAMYDIA/N. GONORROHEAE AMP DNA: NORMAL
HPV16+18+H RISK 12 DNA CVX-IMP: NORMAL
LIQUID BASED PAP SMEAR, SCREENING: NORMAL
TRICHOMONAS VAGINALIS, DNA, URINE: NORMAL

## 2025-02-25 ENCOUNTER — ROUTINE PRENATAL (OUTPATIENT)
Dept: OBSTETRICS AND GYNECOLOGY | Facility: CLINIC | Age: 35
End: 2025-02-25
Payer: MEDICAID

## 2025-02-25 VITALS
BODY MASS INDEX: 27.57 KG/M2 | WEIGHT: 176 LBS | SYSTOLIC BLOOD PRESSURE: 101 MMHG | HEART RATE: 89 BPM | DIASTOLIC BLOOD PRESSURE: 56 MMHG

## 2025-02-25 DIAGNOSIS — Z3A.16 16 WEEKS GESTATION OF PREGNANCY: Primary | ICD-10-CM

## 2025-02-25 DIAGNOSIS — D57.1 SICKLE CELL DISEASE WITHOUT CRISIS: ICD-10-CM

## 2025-02-25 DIAGNOSIS — O09.899 SUPERVISION OF OTHER HIGH RISK PREGNANCY, ANTEPARTUM: ICD-10-CM

## 2025-02-25 LAB
BACTERIA #/AREA URNS HPF: ABNORMAL /HPF
BILIRUB UR QL STRIP: NEGATIVE
CLARITY UR: ABNORMAL
COLOR UR: YELLOW
GLUCOSE UR QL STRIP: NEGATIVE
HGB UR QL STRIP: ABNORMAL
HYALINE CASTS #/AREA URNS LPF: 0.3 /LPF
KETONES UR QL STRIP: NEGATIVE
LEUKOCYTE ESTERASE UR QL STRIP: ABNORMAL
MICROSCOPIC COMMENT: ABNORMAL
NITRITE UR QL STRIP: NEGATIVE
PH UR STRIP: 7 [PH] (ref 5–8)
PROT UR QL STRIP: NEGATIVE
RBC #/AREA URNS HPF: 1 /HPF (ref 0–4)
SP GR UR STRIP: 1.01 (ref 1–1.03)
SQUAMOUS #/AREA URNS HPF: 4 /HPF
URN SPEC COLLECT METH UR: ABNORMAL
UROBILINOGEN UR STRIP-ACNC: 1 EU/DL
WBC #/AREA URNS HPF: >100 /HPF (ref 0–5)

## 2025-02-25 PROCEDURE — 87086 URINE CULTURE/COLONY COUNT: CPT | Performed by: OBSTETRICS & GYNECOLOGY

## 2025-02-25 PROCEDURE — 99999 PR PBB SHADOW E&M-EST. PATIENT-LVL III: CPT | Mod: PBBFAC,,, | Performed by: OBSTETRICS & GYNECOLOGY

## 2025-02-25 PROCEDURE — 81000 URINALYSIS NONAUTO W/SCOPE: CPT | Performed by: OBSTETRICS & GYNECOLOGY

## 2025-02-25 PROCEDURE — 99213 OFFICE O/P EST LOW 20 MIN: CPT | Mod: PBBFAC,TH | Performed by: OBSTETRICS & GYNECOLOGY

## 2025-02-25 PROCEDURE — 99214 OFFICE O/P EST MOD 30 MIN: CPT | Mod: TH,S$PBB,, | Performed by: OBSTETRICS & GYNECOLOGY

## 2025-02-25 NOTE — PROGRESS NOTES
New OB visit today.  Patient states she is doing fairly well.  Does complain of some nausea still.  She does not desire any medication for it.  She is requesting medication for gas.  Recommended Gas-X and also recommended that she may take Tylenol for any regular aches and pains.  Precautions given regarding pain crisis.  Also reviewed labs and all within normal limits other than her profound anemia.  She is taking her iron and folic acid.  Urine sent for culture    Vitals reviewed and fetal well-being reassuring.  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  Discussed good sleep habits and all other questions answered.  Return to clinic in 2 weeks with AFP

## 2025-02-27 LAB — BACTERIA UR CULT: NORMAL

## 2025-03-02 ENCOUNTER — PATIENT MESSAGE (OUTPATIENT)
Dept: OTHER | Facility: OTHER | Age: 35
End: 2025-03-02
Payer: MEDICAID

## 2025-03-08 ENCOUNTER — HOSPITAL ENCOUNTER (EMERGENCY)
Facility: HOSPITAL | Age: 35
Discharge: HOME | End: 2025-03-08
Attending: STUDENT IN AN ORGANIZED HEALTH CARE EDUCATION/TRAINING PROGRAM
Payer: MEDICAID

## 2025-03-08 VITALS
HEART RATE: 79 BPM | HEIGHT: 67 IN | SYSTOLIC BLOOD PRESSURE: 98 MMHG | WEIGHT: 175.5 LBS | RESPIRATION RATE: 27 BRPM | TEMPERATURE: 100 F | OXYGEN SATURATION: 99 % | DIASTOLIC BLOOD PRESSURE: 61 MMHG | BODY MASS INDEX: 27.55 KG/M2

## 2025-03-08 DIAGNOSIS — R07.9 CHEST PAIN: Primary | ICD-10-CM

## 2025-03-08 DIAGNOSIS — N39.0 URINARY TRACT INFECTION WITHOUT HEMATURIA, SITE UNSPECIFIED: ICD-10-CM

## 2025-03-08 DIAGNOSIS — D57.00 SICKLE CELL PAIN CRISIS: ICD-10-CM

## 2025-03-08 DIAGNOSIS — N93.9 VAGINAL SPOTTING: ICD-10-CM

## 2025-03-08 LAB
ABO + RH BLD: NORMAL
ALBUMIN SERPL BCP-MCNC: 3.2 G/DL (ref 3.5–5.2)
ALP SERPL-CCNC: 66 U/L (ref 55–135)
ALT SERPL W/O P-5'-P-CCNC: 14 U/L (ref 10–44)
ANION GAP SERPL CALC-SCNC: 8 MMOL/L (ref 8–16)
AST SERPL-CCNC: 43 U/L (ref 10–40)
BACTERIA #/AREA URNS HPF: ABNORMAL /HPF
BASOPHILS # BLD AUTO: 0.05 K/UL (ref 0–0.2)
BASOPHILS NFR BLD: 0.3 % (ref 0–1.9)
BILIRUB SERPL-MCNC: 2.6 MG/DL (ref 0.1–1)
BILIRUB UR QL STRIP: NEGATIVE
BLD GP AB SCN CELLS X3 SERPL QL: NORMAL
BUN SERPL-MCNC: 6 MG/DL (ref 6–20)
CALCIUM SERPL-MCNC: 8.7 MG/DL (ref 8.7–10.5)
CHLORIDE SERPL-SCNC: 108 MMOL/L (ref 95–110)
CLARITY UR: ABNORMAL
CO2 SERPL-SCNC: 20 MMOL/L (ref 23–29)
COLOR UR: ABNORMAL
CREAT SERPL-MCNC: 0.4 MG/DL (ref 0.5–1.4)
DIFFERENTIAL METHOD BLD: ABNORMAL
EOSINOPHIL # BLD AUTO: 0.2 K/UL (ref 0–0.5)
EOSINOPHIL NFR BLD: 1.6 % (ref 0–8)
ERYTHROCYTE [DISTWIDTH] IN BLOOD BY AUTOMATED COUNT: 17.1 % (ref 11.5–14.5)
EST. GFR  (NO RACE VARIABLE): >60 ML/MIN/1.73 M^2
GLUCOSE SERPL-MCNC: 89 MG/DL (ref 70–110)
GLUCOSE UR QL STRIP: NEGATIVE
HCT VFR BLD AUTO: 22.9 % (ref 37–48.5)
HGB BLD-MCNC: 8.2 G/DL (ref 12–16)
HGB UR QL STRIP: ABNORMAL
HYALINE CASTS #/AREA URNS LPF: 0.7 /LPF
IMM GRANULOCYTES # BLD AUTO: 0.11 K/UL (ref 0–0.04)
IMM GRANULOCYTES NFR BLD AUTO: 0.7 % (ref 0–0.5)
KETONES UR QL STRIP: NEGATIVE
LEUKOCYTE ESTERASE UR QL STRIP: ABNORMAL
LIPASE SERPL-CCNC: 13 U/L (ref 4–60)
LYMPHOCYTES # BLD AUTO: 2.8 K/UL (ref 1–4.8)
LYMPHOCYTES NFR BLD: 18.7 % (ref 18–48)
MCH RBC QN AUTO: 31.3 PG (ref 27–31)
MCHC RBC AUTO-ENTMCNC: 35.8 G/DL (ref 32–36)
MCV RBC AUTO: 87 FL (ref 82–98)
MICROSCOPIC COMMENT: ABNORMAL
MONOCYTES # BLD AUTO: 1.5 K/UL (ref 0.3–1)
MONOCYTES NFR BLD: 9.9 % (ref 4–15)
NEUTROPHILS # BLD AUTO: 10.4 K/UL (ref 1.8–7.7)
NEUTROPHILS NFR BLD: 68.8 % (ref 38–73)
NITRITE UR QL STRIP: NEGATIVE
NRBC BLD-RTO: 3 /100 WBC
OHS QRS DURATION: 88 MS
OHS QTC CALCULATION: 435 MS
PH UR STRIP: 7 [PH] (ref 5–8)
PLATELET # BLD AUTO: 361 K/UL (ref 150–450)
PMV BLD AUTO: 9.5 FL (ref 9.2–12.9)
POTASSIUM SERPL-SCNC: 3.9 MMOL/L (ref 3.5–5.1)
PROT SERPL-MCNC: 7.1 G/DL (ref 6–8.4)
PROT UR QL STRIP: ABNORMAL
RBC # BLD AUTO: 2.62 M/UL (ref 4–5.4)
RBC #/AREA URNS HPF: >100 /HPF (ref 0–4)
RETICS/RBC NFR AUTO: 19.2 % (ref 0.5–2.5)
SODIUM SERPL-SCNC: 136 MMOL/L (ref 136–145)
SP GR UR STRIP: 1.01 (ref 1–1.03)
SPECIMEN OUTDATE: NORMAL
SQUAMOUS #/AREA URNS HPF: 11 /HPF
TROPONIN I SERPL DL<=0.01 NG/ML-MCNC: <3 NG/L (ref 0–14)
URN SPEC COLLECT METH UR: ABNORMAL
UROBILINOGEN UR STRIP-ACNC: 1 EU/DL
WBC # BLD AUTO: 15.06 K/UL (ref 3.9–12.7)
WBC #/AREA URNS HPF: >100 /HPF (ref 0–5)

## 2025-03-08 PROCEDURE — 81000 URINALYSIS NONAUTO W/SCOPE: CPT | Performed by: STUDENT IN AN ORGANIZED HEALTH CARE EDUCATION/TRAINING PROGRAM

## 2025-03-08 PROCEDURE — 36415 COLL VENOUS BLD VENIPUNCTURE: CPT | Performed by: STUDENT IN AN ORGANIZED HEALTH CARE EDUCATION/TRAINING PROGRAM

## 2025-03-08 PROCEDURE — 83690 ASSAY OF LIPASE: CPT | Performed by: STUDENT IN AN ORGANIZED HEALTH CARE EDUCATION/TRAINING PROGRAM

## 2025-03-08 PROCEDURE — 87088 URINE BACTERIA CULTURE: CPT | Performed by: STUDENT IN AN ORGANIZED HEALTH CARE EDUCATION/TRAINING PROGRAM

## 2025-03-08 PROCEDURE — 96374 THER/PROPH/DIAG INJ IV PUSH: CPT

## 2025-03-08 PROCEDURE — 87086 URINE CULTURE/COLONY COUNT: CPT | Performed by: STUDENT IN AN ORGANIZED HEALTH CARE EDUCATION/TRAINING PROGRAM

## 2025-03-08 PROCEDURE — 85025 COMPLETE CBC W/AUTO DIFF WBC: CPT | Performed by: STUDENT IN AN ORGANIZED HEALTH CARE EDUCATION/TRAINING PROGRAM

## 2025-03-08 PROCEDURE — 86901 BLOOD TYPING SEROLOGIC RH(D): CPT | Performed by: STUDENT IN AN ORGANIZED HEALTH CARE EDUCATION/TRAINING PROGRAM

## 2025-03-08 PROCEDURE — 93005 ELECTROCARDIOGRAM TRACING: CPT

## 2025-03-08 PROCEDURE — 99285 EMERGENCY DEPT VISIT HI MDM: CPT | Mod: 25

## 2025-03-08 PROCEDURE — 84484 ASSAY OF TROPONIN QUANT: CPT | Performed by: STUDENT IN AN ORGANIZED HEALTH CARE EDUCATION/TRAINING PROGRAM

## 2025-03-08 PROCEDURE — 93010 ELECTROCARDIOGRAM REPORT: CPT | Mod: ,,, | Performed by: INTERNAL MEDICINE

## 2025-03-08 PROCEDURE — 85045 AUTOMATED RETICULOCYTE COUNT: CPT | Performed by: STUDENT IN AN ORGANIZED HEALTH CARE EDUCATION/TRAINING PROGRAM

## 2025-03-08 PROCEDURE — 80053 COMPREHEN METABOLIC PANEL: CPT | Performed by: STUDENT IN AN ORGANIZED HEALTH CARE EDUCATION/TRAINING PROGRAM

## 2025-03-08 PROCEDURE — 25000003 PHARM REV CODE 250: Performed by: STUDENT IN AN ORGANIZED HEALTH CARE EDUCATION/TRAINING PROGRAM

## 2025-03-08 PROCEDURE — 96361 HYDRATE IV INFUSION ADD-ON: CPT

## 2025-03-08 PROCEDURE — 63600175 PHARM REV CODE 636 W HCPCS: Performed by: STUDENT IN AN ORGANIZED HEALTH CARE EDUCATION/TRAINING PROGRAM

## 2025-03-08 RX ORDER — SUCRALFATE 1 G/10ML
2 SUSPENSION ORAL
Status: COMPLETED | OUTPATIENT
Start: 2025-03-08 | End: 2025-03-08

## 2025-03-08 RX ORDER — CEPHALEXIN 500 MG/1
500 CAPSULE ORAL EVERY 12 HOURS
Qty: 14 CAPSULE | Refills: 0 | Status: SHIPPED | OUTPATIENT
Start: 2025-03-08 | End: 2025-03-15

## 2025-03-08 RX ORDER — CEFTRIAXONE 1 G/1
1 INJECTION, POWDER, FOR SOLUTION INTRAMUSCULAR; INTRAVENOUS
Status: COMPLETED | OUTPATIENT
Start: 2025-03-08 | End: 2025-03-08

## 2025-03-08 RX ADMIN — CEFTRIAXONE SODIUM 1 G: 1 INJECTION, POWDER, FOR SOLUTION INTRAMUSCULAR; INTRAVENOUS at 06:03

## 2025-03-08 RX ADMIN — SODIUM CHLORIDE 1000 ML: 9 INJECTION, SOLUTION INTRAVENOUS at 04:03

## 2025-03-08 RX ADMIN — SUCRALFATE 2 G: 1 SUSPENSION ORAL at 04:03

## 2025-03-08 NOTE — Clinical Note
"Boni Yeh" Gerald was seen and treated in our emergency department on 3/8/2025.  She may return to work on 03/10/2025.       If you have any questions or concerns, please don't hesitate to call.      Sidra RHOADES    "

## 2025-03-09 LAB — BACTERIA UR CULT: ABNORMAL

## 2025-03-12 ENCOUNTER — PATIENT MESSAGE (OUTPATIENT)
Dept: ADMINISTRATIVE | Facility: OTHER | Age: 35
End: 2025-03-12
Payer: MEDICAID

## 2025-03-12 ENCOUNTER — PATIENT MESSAGE (OUTPATIENT)
Dept: OBSTETRICS AND GYNECOLOGY | Facility: CLINIC | Age: 35
End: 2025-03-12

## 2025-03-12 ENCOUNTER — ROUTINE PRENATAL (OUTPATIENT)
Dept: OBSTETRICS AND GYNECOLOGY | Facility: CLINIC | Age: 35
End: 2025-03-12
Payer: MEDICAID

## 2025-03-12 ENCOUNTER — PATIENT MESSAGE (OUTPATIENT)
Dept: MATERNAL FETAL MEDICINE | Facility: CLINIC | Age: 35
End: 2025-03-12
Payer: MEDICAID

## 2025-03-12 VITALS
SYSTOLIC BLOOD PRESSURE: 98 MMHG | DIASTOLIC BLOOD PRESSURE: 62 MMHG | HEART RATE: 111 BPM | WEIGHT: 174.38 LBS | BODY MASS INDEX: 27.31 KG/M2

## 2025-03-12 DIAGNOSIS — O09.92 HIGH-RISK PREGNANCY IN SECOND TRIMESTER: Primary | ICD-10-CM

## 2025-03-12 DIAGNOSIS — D57.1 SICKLE CELL DISEASE WITHOUT CRISIS: ICD-10-CM

## 2025-03-12 DIAGNOSIS — O99.012 ANEMIA DURING PREGNANCY IN SECOND TRIMESTER: ICD-10-CM

## 2025-03-12 PROCEDURE — 99999 PR PBB SHADOW E&M-EST. PATIENT-LVL III: CPT | Mod: PBBFAC,,, | Performed by: OBSTETRICS & GYNECOLOGY

## 2025-03-12 PROCEDURE — 99213 OFFICE O/P EST LOW 20 MIN: CPT | Mod: PBBFAC,TH | Performed by: OBSTETRICS & GYNECOLOGY

## 2025-03-12 PROCEDURE — 99213 OFFICE O/P EST LOW 20 MIN: CPT | Mod: TH,S$PBB,, | Performed by: OBSTETRICS & GYNECOLOGY

## 2025-03-12 NOTE — PROGRESS NOTES
Patient presents for prenatal visit at 18 weeks.  This is the patient's 2nd pregnancy she has had 1 previous full-term vaginal delivery at 38 weeks.  Patient has a history of sickle cell disease.  Patient states that she does not often have a crisis.  She was recently seen in the emergency room and diagnosed as a sickle cell crisis but the patient does not believe it was actually 1.  Patient is followed by Heme-Onc and her last visit there was in August of last year.  Patient is anemic with a hematocrit of 22%.  Patient states she will schedule follow up appointment there.  I will place a referral to Maternal-Fetal Medicine for evaluation as to iron replacement strategy patient states she has had relatively few transfusions in her lifetime.    Patient with no complaints. Denies vaginal bleeding or cramping.  Anatomy scan ordered. RTC in 4 weeks    Vitals signs, FHTs, urine dip, and PE findings documented, reviewed and available in OB flow chart.           I spent a total of 20 minutes on the day of the visit.This includes face to face time and non-face to face time preparing to see the patient (eg, review of tests), Obtaining and/or reviewing separately obtained history, Documenting clinical information in the electronic or other health record, Independently interpreting resultsand communicating results to the patient/family/caregiver, or Care coordination.     Coffective counseling sheet Fall In Love discussed with mother. Reinforced immediate skin to skin, the magic first hour, importance of the first feeding and delaying routine procedures. Encouraged mother to download Coffective mobile aly if she has not already done so. Mother verbalizes understanding.

## 2025-03-23 ENCOUNTER — PATIENT MESSAGE (OUTPATIENT)
Dept: OTHER | Facility: OTHER | Age: 35
End: 2025-03-23
Payer: COMMERCIAL

## 2025-03-26 ENCOUNTER — LAB VISIT (OUTPATIENT)
Dept: LAB | Facility: HOSPITAL | Age: 35
End: 2025-03-26
Attending: INTERNAL MEDICINE
Payer: COMMERCIAL

## 2025-03-26 ENCOUNTER — PROCEDURE VISIT (OUTPATIENT)
Dept: OBSTETRICS AND GYNECOLOGY | Facility: CLINIC | Age: 35
End: 2025-03-26
Payer: COMMERCIAL

## 2025-03-26 ENCOUNTER — ROUTINE PRENATAL (OUTPATIENT)
Dept: OBSTETRICS AND GYNECOLOGY | Facility: CLINIC | Age: 35
End: 2025-03-26
Payer: COMMERCIAL

## 2025-03-26 VITALS
BODY MASS INDEX: 27.63 KG/M2 | DIASTOLIC BLOOD PRESSURE: 74 MMHG | HEART RATE: 86 BPM | SYSTOLIC BLOOD PRESSURE: 112 MMHG | WEIGHT: 176.38 LBS

## 2025-03-26 DIAGNOSIS — O09.92 HIGH-RISK PREGNANCY IN SECOND TRIMESTER: ICD-10-CM

## 2025-03-26 DIAGNOSIS — O99.012 ANEMIA DURING PREGNANCY IN SECOND TRIMESTER: ICD-10-CM

## 2025-03-26 DIAGNOSIS — O09.92 HIGH-RISK PREGNANCY IN SECOND TRIMESTER: Primary | ICD-10-CM

## 2025-03-26 DIAGNOSIS — D57.1 SICKLE CELL DISEASE WITHOUT CRISIS: ICD-10-CM

## 2025-03-26 DIAGNOSIS — D57.00 SICKLE CELL DISEASE WITH CRISIS: ICD-10-CM

## 2025-03-26 DIAGNOSIS — Z3A.20 20 WEEKS GESTATION OF PREGNANCY: ICD-10-CM

## 2025-03-26 DIAGNOSIS — E83.19 IRON OVERLOAD: ICD-10-CM

## 2025-03-26 LAB
ABSOLUTE EOSINOPHIL (OHS): 0.31 K/UL
ABSOLUTE MONOCYTE (OHS): 1.19 K/UL (ref 0.3–1)
ABSOLUTE NEUTROPHIL COUNT (OHS): 8.4 K/UL (ref 1.8–7.7)
ALBUMIN SERPL BCP-MCNC: 2.9 G/DL (ref 3.5–5.2)
ALP SERPL-CCNC: 83 UNIT/L (ref 40–150)
ALT SERPL W/O P-5'-P-CCNC: 22 UNIT/L (ref 10–44)
ANION GAP (OHS): 7 MMOL/L (ref 8–16)
AST SERPL-CCNC: 43 UNIT/L (ref 11–45)
BASOPHILS # BLD AUTO: 0.05 K/UL
BASOPHILS NFR BLD AUTO: 0.4 %
BILIRUB SERPL-MCNC: 2.3 MG/DL (ref 0.1–1)
BILIRUB UR QL STRIP.AUTO: NEGATIVE
BUN SERPL-MCNC: 5 MG/DL (ref 6–20)
CALCIUM SERPL-MCNC: 8.8 MG/DL (ref 8.7–10.5)
CHLORIDE SERPL-SCNC: 107 MMOL/L (ref 95–110)
CLARITY UR: CLEAR
CO2 SERPL-SCNC: 22 MMOL/L (ref 23–29)
COLOR UR AUTO: YELLOW
CREAT SERPL-MCNC: 0.4 MG/DL (ref 0.5–1.4)
ERYTHROCYTE [DISTWIDTH] IN BLOOD BY AUTOMATED COUNT: 17.5 % (ref 11.5–14.5)
FERRITIN SERPL-MCNC: 447.3 NG/ML (ref 20–300)
GFR SERPLBLD CREATININE-BSD FMLA CKD-EPI: >60 ML/MIN/1.73/M2
GLUCOSE SERPL-MCNC: 88 MG/DL (ref 70–110)
GLUCOSE UR QL STRIP: NEGATIVE
HCT VFR BLD AUTO: 24 % (ref 37–48.5)
HGB BLD-MCNC: 8.3 GM/DL (ref 12–16)
HGB UR QL STRIP: NEGATIVE
IMM GRANULOCYTES # BLD AUTO: 0.1 K/UL (ref 0–0.04)
IMM GRANULOCYTES NFR BLD AUTO: 0.8 % (ref 0–0.5)
KETONES UR QL STRIP: NEGATIVE
LEUKOCYTE ESTERASE UR QL STRIP: NEGATIVE
LYMPHOCYTES # BLD AUTO: 3 K/UL (ref 1–4.8)
MCH RBC QN AUTO: 30.9 PG (ref 27–50)
MCHC RBC AUTO-ENTMCNC: 34.6 G/DL (ref 32–36)
MCV RBC AUTO: 89 FL (ref 82–98)
NITRITE UR QL STRIP: NEGATIVE
NUCLEATED RBC (/100WBC) (OHS): 4 /100 WBC
PH UR STRIP: 7 [PH]
PLATELET # BLD AUTO: 563 K/UL (ref 150–450)
PMV BLD AUTO: 9.8 FL (ref 9.2–12.9)
POTASSIUM SERPL-SCNC: 3.6 MMOL/L (ref 3.5–5.1)
PROT SERPL-MCNC: 7.2 GM/DL (ref 6–8.4)
PROT UR QL STRIP: NEGATIVE
RBC # BLD AUTO: 2.69 M/UL (ref 4–5.4)
RELATIVE EOSINOPHIL (OHS): 2.4 %
RELATIVE LYMPHOCYTE (OHS): 23 % (ref 18–48)
RELATIVE MONOCYTE (OHS): 9.1 % (ref 4–15)
RELATIVE NEUTROPHIL (OHS): 64.3 % (ref 38–73)
SODIUM SERPL-SCNC: 136 MMOL/L (ref 136–145)
SP GR UR STRIP: 1.01
UROBILINOGEN UR STRIP-ACNC: 1 EU/DL
WBC # BLD AUTO: 13.05 K/UL (ref 3.9–12.7)

## 2025-03-26 PROCEDURE — 84450 TRANSFERASE (AST) (SGOT): CPT

## 2025-03-26 PROCEDURE — 36415 COLL VENOUS BLD VENIPUNCTURE: CPT

## 2025-03-26 PROCEDURE — 99999 PR PBB SHADOW E&M-EST. PATIENT-LVL II: CPT | Mod: PBBFAC,,, | Performed by: OBSTETRICS & GYNECOLOGY

## 2025-03-26 PROCEDURE — 81003 URINALYSIS AUTO W/O SCOPE: CPT

## 2025-03-26 PROCEDURE — 85025 COMPLETE CBC W/AUTO DIFF WBC: CPT

## 2025-03-26 PROCEDURE — 76805 OB US >/= 14 WKS SNGL FETUS: CPT | Mod: PBBFAC | Performed by: OBSTETRICS & GYNECOLOGY

## 2025-03-26 PROCEDURE — 99212 OFFICE O/P EST SF 10 MIN: CPT | Mod: PBBFAC,TH | Performed by: OBSTETRICS & GYNECOLOGY

## 2025-03-26 PROCEDURE — 82728 ASSAY OF FERRITIN: CPT

## 2025-03-26 PROCEDURE — 99213 OFFICE O/P EST LOW 20 MIN: CPT | Mod: TH,S$PBB,, | Performed by: OBSTETRICS & GYNECOLOGY

## 2025-03-26 NOTE — PROGRESS NOTES
Patient with no complaints. Denies vaginal bleeding or cramping.  Good FM. Discussed with patient having glucose testing for gestational diabetes preformed between 24-28 weeks. RTC in 4 weeks.     Vitals signs, FHTs, urine dip, and PE findings documented, reviewed and available in OB flow chart.           I spent a total of 20 minutes on the day of the visit.This includes face to face time and non-face to face time preparing to see the patient (eg, review of tests), Obtaining and/or reviewing separately obtained history, Documenting clinical information in the electronic or other health record, Independently interpreting resultsand communicating results to the patient/family/caregiver, or Care coordination.     Coffective counseling sheet Learn Your Baby and Protect Breastfeeding discussed with mother. Instructed regarding feeding cues and methods to calm baby. Encouraged mother to download Coffective mobile aly if she has not already done so.  Mother verbalized understanding.

## 2025-03-27 NOTE — ASSESSMENT & PLAN NOTE
Last crisis >1 year ago  FOB not a carrier per patient but   G1 pregnancy in  uncomplicated, no crises, daughter doing well her bday is tomorrow      The patient was counseled about sickle cell anemia in pregnancy. The patient was counseled regarding maternal risks, which include but are not limited to: sickle cell crisis and acute chest syndrome, antepartum hospitalization, venous thromboembolic disease, stroke, preeclampsia/eclampsia,  delivery, infectious morbidity,  delivery, and death. The patient was counseled regarding fetal risks which include but are not limited to: miscarriage, fetal growth restriction, stillbirth, low birthweight, and prematurity. The patient was counseled regarding the inheritance risk. Sickle cell disease is inherited in an autosomal recessive fashion. The carrier rate in the  population is 1:12.     Recommendations:  Laboratory evaluation:  For partner: Hemoglobin electrophoresis and CBC (and additional testing for alpha or beta thalassemia as indicated) ( screening to be performed after birth)  Prenatal diagnosis is available if desired/indicated  Baseline labs: CBC and CMP reviewed. Needs P/C ratio or 24 hour urine protein  Urine culture q trimester  CBC monthly  Medications:  Folic acid 4 mg  Aspirin 81 mg, starting at 12 weeks gestation. We reviewed this today and Rx sent.   Avoid hydroxyurea in pregnancy. She is not on any medications currently.   Limited data on Jadenu in pregnancy. If it is required for iron chelation per Hematology, please re-engage MFM so we can discuss r/b/a  Narcotics per heme/onc-avoid abrupt cessation and avoid NSAIDs, particularly after 32 weeks  Avoid iron supplementation due to risks of iron overload. I have asked Boni to hold her PNV and iron supplement at this time. Can try PNV without iron. Encouraged discussion with Dr. Holguin on Tuesday.   Prophylactic lovenox or heparin while admitted to the  hospital antepartum or postpartum  Vaccinations (per primary physician):  Hep B, pneumovax, flu vaccination annually, and COVID vaccination as appropriate  Baseline maternal evaluation (ordered by primary OB):  Maternal echo - needs (last in )  EKG - reviewed from   Pulmonary function studies, if not performed previously  Ophthalmic exam - needs, reports last >3 years ago  Ultrasounds (with MFM):  Detailed anatomic survey at 19-20 weeks - completed 3/26  Serial growth ultrasounds starting at 26-28 weeks, to be performed every 4-6 weeks -  Antepartum testing twice weekly at 32 weeks (to be facilitated by primary OB)  Delivery timin weeks, unless indicated earlier

## 2025-03-28 ENCOUNTER — OFFICE VISIT (OUTPATIENT)
Dept: MATERNAL FETAL MEDICINE | Facility: CLINIC | Age: 35
End: 2025-03-28
Payer: COMMERCIAL

## 2025-03-28 DIAGNOSIS — O99.012 ANEMIA DURING PREGNANCY IN SECOND TRIMESTER: ICD-10-CM

## 2025-03-28 DIAGNOSIS — Z36.89 ENCOUNTER FOR ULTRASOUND TO ASSESS FETAL GROWTH: ICD-10-CM

## 2025-03-28 DIAGNOSIS — D57.00 SICKLE CELL DISEASE WITH CRISIS: Primary | ICD-10-CM

## 2025-03-28 DIAGNOSIS — Z36.2 ENCOUNTER FOR FOLLOW-UP ULTRASOUND OF FETAL ANATOMY: ICD-10-CM

## 2025-03-28 DIAGNOSIS — D57.1 SICKLE CELL DISEASE WITHOUT CRISIS: ICD-10-CM

## 2025-03-28 NOTE — PROGRESS NOTES
"MATERNAL-FETAL MEDICINE   CONSULT NOTE    Provider requesting consultation: Dr. Mariela Pathak, Dr. Maximilian Desir    The patient location is: Home  The chief complaint leading to consultation is: Sickle Cell Disease affecting pregnancy    Visit type: audiovisual     Face to Face time with patient: 17m 16s    Each patient to whom he or she provides medical services by telemedicine is:  (1) informed of the relationship between the physician and patient and the respective role of any other health care provider with respect to management of the patient; and (2) notified that he or she may decline to receive medical services by telemedicine and may withdraw from such care at any time.    SUBJECTIVE:     Ms. Boni Duarte is a 34 y.o.  female with IUP at 20w5d who is seen in consultation by MFM for evaluation and management of:  Problem   Sickle Cell Disease     Boni shared that she's dealt with SCD her entire life. She follows with hematology, Dr. Holguin, typically q3 months, though has not seen him during this pregnancy. Last note I could find for review was 2024. Next appt is on . Describes her pain crises as "coming out of nowhere" with swelling and sharp pains, no specific joint is disproportionately affected. Triggers are weather changes, stress, poor sleep. Has not had a crisis in >1 year and denies ever requiring hospitalization. Reports that FOB is not affected by SCD or has sickle cell trait, though on further discussion, is uncertain if he's had blood testing in pursuit of this.     From a medications perspective, she was managed on Jadenu for some time, but has not been on for >1 year.     Currently she is on folic acid. Was additionally started on iron and PNV containing iron.          Medication List with Changes/Refills   Current Medications    FERROUS SULFATE 325 (65 FE) MG EC TABLET    Take 1 tablet (325 mg total) by mouth 3 (three) times daily with meals.    FOLIC ACID " (FOLVITE) 1 MG TABLET    Take 1 tablet (1 mg total) by mouth once daily.    PNV,CALCIUM 72/IRON/FOLIC ACID (PRENATAL VITAMIN) TAB    Take 1 tablet by mouth once daily.       Review of patient's allergies indicates:  No Known Allergies    PMH:  Past Medical History:   Diagnosis Date    Anemia     Encounter for blood transfusion     Pneumonia     Pregnancy         Sickle cell anemia     Sickle cell disease 2015    UTI (urinary tract infection)        PObHx:  OB History    Para Term  AB Living   2 1 1 0 0 1   SAB IAB Ectopic Multiple Live Births   0 0 0 0 1      # Outcome Date GA Lbr Narciso/2nd Weight Sex Type Anes PTL Lv   2 Current            1 Term 20 38w3d   F Vag-Spont   MARILYN       PSH:  Past Surgical History:   Procedure Laterality Date    CHOLECYSTECTOMY         Family history:family history includes No Known Problems in her father and mother.    Social history: reports that she has never smoked. She has never used smokeless tobacco. She reports that she does not drink alcohol and does not use drugs.    Genetic history:  The patient denies any inherited genetic diseases or birth defects in herself or her partner's personal history or family.    Objective:   LMP 2024 (Approximate)     Physical Exam   Deferred in setting of telemedicine. Overall, patient well appearing and engaged during our video visit, with no evidence of conversational dyspnea.       Ultrasound report reviewed: 3/26/2025 anatomic survey complete    Significant labs/imaging:  Lab Results   Component Value Date    WBC 13.05 (H) 2025    HGB 8.3 (L) 2025    HCT 24.0 (L) 2025    MCV 89 2025     (H) 2025       CMP  Sodium   Date Value Ref Range Status   2025 136 136 - 145 mmol/L Final   2025 136 136 - 145 mmol/L Final     Potassium   Date Value Ref Range Status   2025 3.6 3.5 - 5.1 mmol/L Final   2025 3.9 3.5 - 5.1 mmol/L Final     Chloride   Date Value Ref  Range Status   03/26/2025 107 95 - 110 mmol/L Final   03/08/2025 108 95 - 110 mmol/L Final     CO2   Date Value Ref Range Status   03/26/2025 22 (L) 23 - 29 mmol/L Final   03/08/2025 20 (L) 23 - 29 mmol/L Final     Glucose   Date Value Ref Range Status   03/08/2025 89 70 - 110 mg/dL Final     BUN   Date Value Ref Range Status   03/26/2025 5 (L) 6 - 20 mg/dL Final     Creatinine   Date Value Ref Range Status   03/26/2025 0.4 (L) 0.5 - 1.4 mg/dL Final     Calcium   Date Value Ref Range Status   03/26/2025 8.8 8.7 - 10.5 mg/dL Final   03/08/2025 8.7 8.7 - 10.5 mg/dL Final     Total Protein   Date Value Ref Range Status   03/08/2025 7.1 6.0 - 8.4 g/dL Final     Albumin   Date Value Ref Range Status   03/26/2025 2.9 (L) 3.5 - 5.2 g/dL Final   03/08/2025 3.2 (L) 3.5 - 5.2 g/dL Final     Total Bilirubin   Date Value Ref Range Status   03/08/2025 2.6 (H) 0.1 - 1.0 mg/dL Final     Comment:     For infants and newborns, interpretation of results should be based  on gestational age, weight and in agreement with clinical  observations.    Premature Infant recommended reference ranges:  Up to 24 hours.............<8.0 mg/dL  Up to 48 hours............<12.0 mg/dL  3-5 days..................<15.0 mg/dL  6-29 days.................<15.0 mg/dL       Bilirubin Total   Date Value Ref Range Status   03/26/2025 2.3 (H) 0.1 - 1.0 mg/dL Final     Comment:     For infants and newborns, interpretation of results should be based   on gestational age, weight and in agreement with clinical   observations.    Premature Infant recommended reference ranges:   0-24 hours:  <8.0 mg/dL   24-48 hours: <12.0 mg/dL   3-5 days:    <15.0 mg/dL   6-29 days:   <15.0 mg/dL     Alkaline Phosphatase   Date Value Ref Range Status   03/08/2025 66 55 - 135 U/L Final     ALP   Date Value Ref Range Status   03/26/2025 83 40 - 150 unit/L Final     AST   Date Value Ref Range Status   03/26/2025 43 11 - 45 unit/L Final   03/08/2025 43 (H) 10 - 40 U/L Final     ALT    Date Value Ref Range Status   2025 22 10 - 44 unit/L Final   2025 14 10 - 44 U/L Final     Anion Gap   Date Value Ref Range Status   2025 7 (L) 8 - 16 mmol/L Final     eGFR   Date Value Ref Range Status   2025 >60 >60 mL/min/1.73/m2 Final     Comment:     Estimated GFR calculated using the CKD-EPI creatinine () equation.   2025 >60.0 >60 mL/min/1.73 m^2 Final         ASSESSMENT/PLAN:     34 y.o.  female with IUP at 20w5d     Sickle cell disease  Last crisis >1 year ago  FOB not a carrier per patient but   G1 pregnancy in  uncomplicated, no crises, daughter doing well her bday is tomorrow      The patient was counseled about sickle cell anemia in pregnancy. The patient was counseled regarding maternal risks, which include but are not limited to: sickle cell crisis and acute chest syndrome, antepartum hospitalization, venous thromboembolic disease, stroke, preeclampsia/eclampsia,  delivery, infectious morbidity,  delivery, and death. The patient was counseled regarding fetal risks which include but are not limited to: miscarriage, fetal growth restriction, stillbirth, low birthweight, and prematurity. The patient was counseled regarding the inheritance risk. Sickle cell disease is inherited in an autosomal recessive fashion. The carrier rate in the  population is 1:12.     Recommendations:  Laboratory evaluation:  For partner: Hemoglobin electrophoresis and CBC (and additional testing for alpha or beta thalassemia as indicated) ([place] screening to be performed after birth)  Prenatal diagnosis is available if desired/indicated  Baseline labs: CBC and CMP reviewed. Needs P/C ratio or 24 hour urine protein  Urine culture q trimester  CBC monthly  Medications:  Folic acid 4 mg  Aspirin 81 mg, starting at 12 weeks gestation. We reviewed this today and Rx sent.   Avoid hydroxyurea in pregnancy. She is not on any medications currently.   Limited  data on Jadenu in pregnancy. If it is required for iron chelation per Hematology, please re-engage MFM so we can discuss r/b/a  Narcotics per heme/onc-avoid abrupt cessation and avoid NSAIDs, particularly after 32 weeks  Avoid iron supplementation due to risks of iron overload. I have asked Boni to hold her PNV and iron supplement at this time. Can try PNV without iron. Encouraged discussion with Dr. Holguin on Tuesday.   Prophylactic lovenox or heparin while admitted to the hospital antepartum or postpartum  Vaccinations (per primary physician):  Hep B, pneumovax, flu vaccination annually, and COVID vaccination as appropriate  Baseline maternal evaluation (ordered by primary OB):  Maternal echo - needs (last in )  EKG - reviewed from   Pulmonary function studies, if not performed previously  Ophthalmic exam - needs, reports last >3 years ago  Ultrasounds (with MFM):  Detailed anatomic survey at 19-20 weeks - completed 3/26  Serial growth ultrasounds starting at 26-28 weeks, to be performed every 4-6 weeks -  Antepartum testing twice weekly at 32 weeks (to be facilitated by primary OB)  Delivery timin weeks, unless indicated earlier      FOLLOW UP:   F/u in 6 weeks for US/MFM visit    45 minutes of total time spent on the encounter, which includes face to face time and non-face to face time preparing to see the patient (eg, review of tests), obtaining and/or reviewing separately obtained history, documenting clinical information in the electronic or other health record, independently interpreting results (not separately reported) and communicating results to the patient/family/caregiver, or care coordination (not separately reported).      Vicki Sands  Maternal-Fetal Medicine    Electronically Signed by Vicki Sands 2025

## 2025-04-03 ENCOUNTER — HOSPITAL ENCOUNTER (EMERGENCY)
Facility: HOSPITAL | Age: 35
Discharge: ANOTHER HOSPITAL | End: 2025-04-04
Attending: EMERGENCY MEDICINE
Payer: COMMERCIAL

## 2025-04-03 DIAGNOSIS — R07.9 CHEST PAIN: ICD-10-CM

## 2025-04-03 DIAGNOSIS — D57.01 ACUTE CHEST SYNDROME: Primary | ICD-10-CM

## 2025-04-03 LAB
ABSOLUTE EOSINOPHIL (OHS): 0.17 K/UL
ABSOLUTE MONOCYTE (OHS): 1.78 K/UL (ref 0.3–1)
ABSOLUTE NEUTROPHIL COUNT (OHS): 9.95 K/UL (ref 1.8–7.7)
ALBUMIN SERPL BCP-MCNC: 2.9 G/DL (ref 3.5–5.2)
ALP SERPL-CCNC: 84 UNIT/L (ref 40–150)
ALT SERPL W/O P-5'-P-CCNC: 13 UNIT/L (ref 10–44)
ANION GAP (OHS): 10 MMOL/L (ref 8–16)
AST SERPL-CCNC: 39 UNIT/L (ref 11–45)
BASOPHILS # BLD AUTO: 0.03 K/UL
BASOPHILS NFR BLD AUTO: 0.2 %
BILIRUB SERPL-MCNC: 2.3 MG/DL (ref 0.1–1)
BUN SERPL-MCNC: 5 MG/DL (ref 6–20)
CALCIUM SERPL-MCNC: 8.5 MG/DL (ref 8.7–10.5)
CHLORIDE SERPL-SCNC: 104 MMOL/L (ref 95–110)
CO2 SERPL-SCNC: 21 MMOL/L (ref 23–29)
CREAT SERPL-MCNC: 0.5 MG/DL (ref 0.5–1.4)
ERYTHROCYTE [DISTWIDTH] IN BLOOD BY AUTOMATED COUNT: 17.4 % (ref 11.5–14.5)
GFR SERPLBLD CREATININE-BSD FMLA CKD-EPI: >60 ML/MIN/1.73/M2
GLUCOSE SERPL-MCNC: 86 MG/DL (ref 70–110)
HCT VFR BLD AUTO: 24.7 % (ref 37–48.5)
HGB BLD-MCNC: 8.8 GM/DL (ref 12–16)
IMM GRANULOCYTES # BLD AUTO: 0.1 K/UL (ref 0–0.04)
IMM GRANULOCYTES NFR BLD AUTO: 0.7 % (ref 0–0.5)
LYMPHOCYTES # BLD AUTO: 2.42 K/UL (ref 1–4.8)
MCH RBC QN AUTO: 30.7 PG (ref 27–31)
MCHC RBC AUTO-ENTMCNC: 35.6 G/DL (ref 32–36)
MCV RBC AUTO: 86 FL (ref 82–98)
NUCLEATED RBC (/100WBC) (OHS): 3 /100 WBC
PLATELET # BLD AUTO: 424 K/UL (ref 150–450)
PMV BLD AUTO: 9.5 FL (ref 9.2–12.9)
POTASSIUM SERPL-SCNC: 3.7 MMOL/L (ref 3.5–5.1)
PROT SERPL-MCNC: 7.3 GM/DL (ref 6–8.4)
RBC # BLD AUTO: 2.87 M/UL (ref 4–5.4)
RELATIVE EOSINOPHIL (OHS): 1.2 %
RELATIVE LYMPHOCYTE (OHS): 16.7 % (ref 18–48)
RELATIVE MONOCYTE (OHS): 12.3 % (ref 4–15)
RELATIVE NEUTROPHIL (OHS): 68.9 % (ref 38–73)
RETICS/RBC NFR AUTO: 14.8 % (ref 0.5–2.5)
SODIUM SERPL-SCNC: 135 MMOL/L (ref 136–145)
TROPONIN I SERPL HS-MCNC: <3 NG/L
WBC # BLD AUTO: 14.45 K/UL (ref 3.9–12.7)

## 2025-04-03 PROCEDURE — 93005 ELECTROCARDIOGRAM TRACING: CPT

## 2025-04-03 PROCEDURE — 87040 BLOOD CULTURE FOR BACTERIA: CPT | Performed by: EMERGENCY MEDICINE

## 2025-04-03 PROCEDURE — 93010 ELECTROCARDIOGRAM REPORT: CPT | Mod: ,,, | Performed by: INTERNAL MEDICINE

## 2025-04-03 PROCEDURE — 96365 THER/PROPH/DIAG IV INF INIT: CPT

## 2025-04-03 PROCEDURE — 99285 EMERGENCY DEPT VISIT HI MDM: CPT | Mod: 25

## 2025-04-03 PROCEDURE — 84484 ASSAY OF TROPONIN QUANT: CPT | Performed by: EMERGENCY MEDICINE

## 2025-04-03 PROCEDURE — 85045 AUTOMATED RETICULOCYTE COUNT: CPT | Performed by: EMERGENCY MEDICINE

## 2025-04-03 PROCEDURE — 96361 HYDRATE IV INFUSION ADD-ON: CPT

## 2025-04-03 PROCEDURE — 96375 TX/PRO/DX INJ NEW DRUG ADDON: CPT

## 2025-04-03 PROCEDURE — 85025 COMPLETE CBC W/AUTO DIFF WBC: CPT | Performed by: EMERGENCY MEDICINE

## 2025-04-03 PROCEDURE — 25000003 PHARM REV CODE 250: Performed by: EMERGENCY MEDICINE

## 2025-04-03 PROCEDURE — 36415 COLL VENOUS BLD VENIPUNCTURE: CPT | Performed by: EMERGENCY MEDICINE

## 2025-04-03 PROCEDURE — 63600175 PHARM REV CODE 636 W HCPCS: Performed by: EMERGENCY MEDICINE

## 2025-04-03 PROCEDURE — 80053 COMPREHEN METABOLIC PANEL: CPT | Performed by: EMERGENCY MEDICINE

## 2025-04-03 RX ORDER — CEFTRIAXONE 1 G/1
1 INJECTION, POWDER, FOR SOLUTION INTRAMUSCULAR; INTRAVENOUS
Status: DISCONTINUED | OUTPATIENT
Start: 2025-04-03 | End: 2025-04-04 | Stop reason: HOSPADM

## 2025-04-03 RX ADMIN — CEFTRIAXONE SODIUM 1 G: 1 INJECTION, POWDER, FOR SOLUTION INTRAMUSCULAR; INTRAVENOUS at 09:04

## 2025-04-03 RX ADMIN — AZITHROMYCIN MONOHYDRATE 500 MG: 500 INJECTION, POWDER, LYOPHILIZED, FOR SOLUTION INTRAVENOUS at 09:04

## 2025-04-03 RX ADMIN — SODIUM CHLORIDE 1000 ML: 9 INJECTION, SOLUTION INTRAVENOUS at 07:04

## 2025-04-04 ENCOUNTER — HOSPITAL ENCOUNTER (INPATIENT)
Facility: OTHER | Age: 35
LOS: 3 days | Discharge: HOME | DRG: 831 | End: 2025-04-07
Attending: OBSTETRICS & GYNECOLOGY | Admitting: OBSTETRICS & GYNECOLOGY
Payer: COMMERCIAL

## 2025-04-04 VITALS
HEIGHT: 67 IN | SYSTOLIC BLOOD PRESSURE: 109 MMHG | RESPIRATION RATE: 23 BRPM | WEIGHT: 176.38 LBS | BODY MASS INDEX: 27.68 KG/M2 | HEART RATE: 86 BPM | OXYGEN SATURATION: 99 % | DIASTOLIC BLOOD PRESSURE: 69 MMHG | TEMPERATURE: 99 F

## 2025-04-04 DIAGNOSIS — D57.01 ACUTE CHEST SYNDROME: ICD-10-CM

## 2025-04-04 DIAGNOSIS — D57.00 SICKLE CELL PAIN CRISIS: Primary | ICD-10-CM

## 2025-04-04 LAB
BACTERIA #/AREA URNS AUTO: ABNORMAL /HPF
BILIRUB UR QL STRIP.AUTO: NEGATIVE
CLARITY UR: ABNORMAL
COLOR UR AUTO: YELLOW
GLUCOSE UR QL STRIP: NEGATIVE
HGB UR QL STRIP: NEGATIVE
INDIRECT COOMBS: NORMAL
KETONES UR QL STRIP: NEGATIVE
LEUKOCYTE ESTERASE UR QL STRIP: ABNORMAL
MICROSCOPIC COMMENT: ABNORMAL
NITRITE UR QL STRIP: NEGATIVE
OHS QRS DURATION: 86 MS
OHS QTC CALCULATION: 440 MS
PH UR STRIP: 6 [PH]
PROT UR QL STRIP: NEGATIVE
RBC #/AREA URNS AUTO: 2 /HPF (ref 0–4)
RH BLD: NORMAL
SP GR UR STRIP: 1.01
SPECIMEN OUTDATE: NORMAL
SQUAMOUS #/AREA URNS AUTO: 29 /HPF
UROBILINOGEN UR STRIP-ACNC: ABNORMAL EU/DL
WBC #/AREA URNS AUTO: 12 /HPF (ref 0–5)

## 2025-04-04 PROCEDURE — 86850 RBC ANTIBODY SCREEN: CPT

## 2025-04-04 PROCEDURE — 87086 URINE CULTURE/COLONY COUNT: CPT

## 2025-04-04 PROCEDURE — 0202U NFCT DS 22 TRGT SARS-COV-2: CPT

## 2025-04-04 PROCEDURE — 63600175 PHARM REV CODE 636 W HCPCS

## 2025-04-04 PROCEDURE — 86902 BLOOD TYPE ANTIGEN DONOR EA: CPT

## 2025-04-04 PROCEDURE — 25000003 PHARM REV CODE 250

## 2025-04-04 PROCEDURE — 99223 1ST HOSP IP/OBS HIGH 75: CPT | Mod: ,,, | Performed by: OBSTETRICS & GYNECOLOGY

## 2025-04-04 PROCEDURE — 11000001 HC ACUTE MED/SURG PRIVATE ROOM

## 2025-04-04 PROCEDURE — 81003 URINALYSIS AUTO W/O SCOPE: CPT

## 2025-04-04 PROCEDURE — 86920 COMPATIBILITY TEST SPIN: CPT

## 2025-04-04 RX ORDER — AMOXICILLIN 250 MG
1 CAPSULE ORAL NIGHTLY PRN
Status: DISCONTINUED | OUTPATIENT
Start: 2025-04-04 | End: 2025-04-07 | Stop reason: HOSPADM

## 2025-04-04 RX ORDER — SIMETHICONE 80 MG
1 TABLET,CHEWABLE ORAL EVERY 6 HOURS PRN
Status: DISCONTINUED | OUTPATIENT
Start: 2025-04-04 | End: 2025-04-07 | Stop reason: HOSPADM

## 2025-04-04 RX ORDER — OXYCODONE HYDROCHLORIDE 10 MG/1
10 TABLET ORAL EVERY 6 HOURS PRN
Refills: 0 | Status: DISCONTINUED | OUTPATIENT
Start: 2025-04-04 | End: 2025-04-07 | Stop reason: HOSPADM

## 2025-04-04 RX ORDER — DIPHENHYDRAMINE HCL 25 MG
25 CAPSULE ORAL EVERY 4 HOURS PRN
Status: DISCONTINUED | OUTPATIENT
Start: 2025-04-04 | End: 2025-04-07 | Stop reason: HOSPADM

## 2025-04-04 RX ORDER — HYDROCODONE BITARTRATE AND ACETAMINOPHEN 500; 5 MG/1; MG/1
TABLET ORAL
Status: DISCONTINUED | OUTPATIENT
Start: 2025-04-04 | End: 2025-04-07 | Stop reason: HOSPADM

## 2025-04-04 RX ORDER — DIPHENHYDRAMINE HYDROCHLORIDE 50 MG/ML
25 INJECTION, SOLUTION INTRAMUSCULAR; INTRAVENOUS EVERY 4 HOURS PRN
Status: DISCONTINUED | OUTPATIENT
Start: 2025-04-04 | End: 2025-04-07 | Stop reason: HOSPADM

## 2025-04-04 RX ORDER — PRENATAL WITH FERROUS FUM AND FOLIC ACID 3080; 920; 120; 400; 22; 1.84; 3; 20; 10; 1; 12; 200; 27; 25; 2 [IU]/1; [IU]/1; MG/1; [IU]/1; MG/1; MG/1; MG/1; MG/1; MG/1; MG/1; UG/1; MG/1; MG/1; MG/1; MG/1
1 TABLET ORAL DAILY
Status: DISCONTINUED | OUTPATIENT
Start: 2025-04-04 | End: 2025-04-07 | Stop reason: HOSPADM

## 2025-04-04 RX ORDER — OXYCODONE HYDROCHLORIDE 5 MG/1
5 TABLET ORAL EVERY 4 HOURS PRN
Refills: 0 | Status: DISCONTINUED | OUTPATIENT
Start: 2025-04-04 | End: 2025-04-07 | Stop reason: HOSPADM

## 2025-04-04 RX ORDER — CEFTRIAXONE 2 G/1
2 INJECTION, POWDER, FOR SOLUTION INTRAMUSCULAR; INTRAVENOUS
Status: DISCONTINUED | OUTPATIENT
Start: 2025-04-05 | End: 2025-04-07

## 2025-04-04 RX ORDER — PRENATAL WITH FERROUS FUM AND FOLIC ACID 3080; 920; 120; 400; 22; 1.84; 3; 20; 10; 1; 12; 200; 27; 25; 2 [IU]/1; [IU]/1; MG/1; [IU]/1; MG/1; MG/1; MG/1; MG/1; MG/1; MG/1; UG/1; MG/1; MG/1; MG/1; MG/1
1 TABLET ORAL DAILY
Status: DISCONTINUED | OUTPATIENT
Start: 2025-04-04 | End: 2025-04-04

## 2025-04-04 RX ORDER — ONDANSETRON 8 MG/1
8 TABLET, ORALLY DISINTEGRATING ORAL EVERY 8 HOURS PRN
Status: DISCONTINUED | OUTPATIENT
Start: 2025-04-04 | End: 2025-04-07 | Stop reason: HOSPADM

## 2025-04-04 RX ORDER — SODIUM CHLORIDE 0.9 % (FLUSH) 0.9 %
10 SYRINGE (ML) INJECTION
Status: DISCONTINUED | OUTPATIENT
Start: 2025-04-04 | End: 2025-04-07 | Stop reason: HOSPADM

## 2025-04-04 RX ORDER — ACETAMINOPHEN 325 MG/1
650 TABLET ORAL EVERY 6 HOURS PRN
Status: DISCONTINUED | OUTPATIENT
Start: 2025-04-04 | End: 2025-04-07 | Stop reason: HOSPADM

## 2025-04-04 RX ORDER — SODIUM CHLORIDE, SODIUM LACTATE, POTASSIUM CHLORIDE, CALCIUM CHLORIDE 600; 310; 30; 20 MG/100ML; MG/100ML; MG/100ML; MG/100ML
INJECTION, SOLUTION INTRAVENOUS CONTINUOUS
Status: DISCONTINUED | OUTPATIENT
Start: 2025-04-04 | End: 2025-04-06

## 2025-04-04 RX ORDER — CEFTRIAXONE 1 G/1
1 INJECTION, POWDER, FOR SOLUTION INTRAMUSCULAR; INTRAVENOUS EVERY 8 HOURS
Status: DISCONTINUED | OUTPATIENT
Start: 2025-04-04 | End: 2025-04-04

## 2025-04-04 RX ORDER — CEFTRIAXONE 1 G/1
1 INJECTION, POWDER, FOR SOLUTION INTRAMUSCULAR; INTRAVENOUS
Status: DISCONTINUED | OUTPATIENT
Start: 2025-04-04 | End: 2025-04-04

## 2025-04-04 RX ORDER — FOLIC ACID 1 MG/1
4 TABLET ORAL DAILY
Status: DISCONTINUED | OUTPATIENT
Start: 2025-04-04 | End: 2025-04-07 | Stop reason: HOSPADM

## 2025-04-04 RX ADMIN — AZITHROMYCIN MONOHYDRATE 500 MG: 500 INJECTION, POWDER, LYOPHILIZED, FOR SOLUTION INTRAVENOUS at 09:04

## 2025-04-04 RX ADMIN — SODIUM CHLORIDE, POTASSIUM CHLORIDE, SODIUM LACTATE AND CALCIUM CHLORIDE: 600; 310; 30; 20 INJECTION, SOLUTION INTRAVENOUS at 06:04

## 2025-04-04 RX ADMIN — CEFTRIAXONE SODIUM 1 G: 1 INJECTION, POWDER, FOR SOLUTION INTRAMUSCULAR; INTRAVENOUS at 01:04

## 2025-04-04 RX ADMIN — PRENATAL VIT W/ FE FUMARATE-FA TAB 27-0.8 MG 1 TABLET: 27-0.8 TAB at 11:04

## 2025-04-04 RX ADMIN — FOLIC ACID 4 MG: 1 TABLET ORAL at 11:04

## 2025-04-04 RX ADMIN — CEFTRIAXONE SODIUM 1 G: 1 INJECTION, POWDER, FOR SOLUTION INTRAMUSCULAR; INTRAVENOUS at 10:04

## 2025-04-04 RX ADMIN — SODIUM CHLORIDE, POTASSIUM CHLORIDE, SODIUM LACTATE AND CALCIUM CHLORIDE: 600; 310; 30; 20 INJECTION, SOLUTION INTRAVENOUS at 10:04

## 2025-04-04 RX ADMIN — OXYCODONE HYDROCHLORIDE 5 MG: 5 TABLET ORAL at 09:04

## 2025-04-04 NOTE — LETTER
April 7, 2025         [address]  [place] [address]  Phone: [phone]  Fax: [phone]       Patient: Boni Duarte   YOB: 1990  Date of Visit: 04/07/2025    To Whom It May Concern:    Sara Duarte  was at [facility] on 4/4/25 - 04/07/2025. The patient may return to work/school on 4/8/25 {With/no:95986} restrictions. If you have any questions or concerns, or if I can be of further assistance, please do not hesitate to contact me.    Sincerely,    Aric Alas RN

## 2025-04-04 NOTE — ED PROVIDER NOTES
"Encounter Date: 4/3/2025       History     Chief Complaint   Patient presents with    Chest Pain     Pt to the ER w/ complaints of chest pain x 2 days, reports "throbbing pain" radiating to her back. Denies any other symptoms. 21 weeks pregnant, . Hx of sickle cell.     34-year-old female with a history of sickle cell anemia presents to the emergency department for evaluation due to chest pain.  Onset 3 days prior to ED evaluation.  Patient describes midsternal "throbbing pain".  No associated symptoms.  Nontoxic.  Afebrile.  Moist mucous membranes.  Well hydrated.  No cough.  No dyspnea.  No wheezing.  No stridor.  Tolerating oral secretions.        Review of patient's allergies indicates:  No Known Allergies  Past Medical History:   Diagnosis Date    Anemia     Encounter for blood transfusion     Pneumonia     Pregnancy         Sickle cell anemia     Sickle cell disease 2015    UTI (urinary tract infection)      Past Surgical History:   Procedure Laterality Date    CHOLECYSTECTOMY       Family History   Problem Relation Name Age of Onset    No Known Problems Mother      No Known Problems Father       Social History[1]  Review of Systems   Cardiovascular:  Positive for chest pain.   All other systems reviewed and are negative.      Physical Exam     Initial Vitals   BP Pulse Resp Temp SpO2   25 1852 25 1852 25 1852 25 1905 25 1852   129/80 83 18 98.8 °F (37.1 °C) 97 %      MAP       --                Physical Exam    Nursing note and vitals reviewed.  Constitutional: She appears well-developed and well-nourished.   HENT:   Head: Normocephalic and atraumatic.   Eyes: EOM are normal. Pupils are equal, round, and reactive to light.   Neck: Neck supple.   Normal range of motion.  Cardiovascular:  Normal rate, regular rhythm and normal heart sounds.     Exam reveals no gallop and no friction rub.       No murmur heard.  Pulmonary/Chest: Effort normal and breath sounds normal. "   Abdominal: Abdomen is soft. Bowel sounds are normal. She exhibits no distension. There is no abdominal tenderness.   Musculoskeletal:         General: Normal range of motion.      Cervical back: Normal range of motion and neck supple.     Neurological: She is alert and oriented to person, place, and time. GCS score is 15. GCS eye subscore is 4. GCS verbal subscore is 5. GCS motor subscore is 6.   Skin: Skin is warm and dry. No rash noted.   Psychiatric: She has a normal mood and affect.         ED Course   Procedures  Labs Reviewed   COMPREHENSIVE METABOLIC PANEL - Abnormal       Result Value    Sodium 135 (*)     Potassium 3.7      Chloride 104      CO2 21 (*)     Glucose 86      BUN 5 (*)     Creatinine 0.5      Calcium 8.5 (*)     Protein Total 7.3      Albumin 2.9 (*)     Bilirubin Total 2.3 (*)     ALP 84      AST 39      ALT 13      Anion Gap 10      eGFR >60     RETICULOCYTES - Abnormal    Retic Count, Automated 14.8 (*)    CBC WITH DIFFERENTIAL - Abnormal    WBC 14.45 (*)     RBC 2.87 (*)     HGB 8.8 (*)     HCT 24.7 (*)     MCV 86      MCH 30.7      MCHC 35.6      RDW 17.4 (*)     Platelet Count 424      MPV 9.5      Nucleated RBC 3 (*)     Neut % 68.9      Lymph % 16.7 (*)     Mono % 12.3      Eos % 1.2      Basophil % 0.2      Imm Grans % 0.7 (*)     Neut # 9.95 (*)     Lymph # 2.42      Mono # 1.78 (*)     Eos # 0.17      Baso # 0.03      Imm Grans # 0.10 (*)    TROPONIN I HIGH SENSITIVITY - Normal    Troponin High Sensitive <3     CULTURE, BLOOD   CULTURE, BLOOD   CBC W/ AUTO DIFFERENTIAL    Narrative:     The following orders were created for panel order CBC Auto Differential.  Procedure                               Abnormality         Status                     ---------                               -----------         ------                     CBC with Differential[[id number]]       Abnormal            Final result                 Please view results for these tests on the individual orders.      EKG Readings: (Independently Interpreted)   Initial Reading: No STEMI. Rhythm: Normal Sinus Rhythm. Heart Rate: 86. Ectopy: No Ectopy. ST Segments: Normal ST Segments. T Waves Flipped: V1. Clinical Impression: Normal Sinus Rhythm       Imaging Results              X-Ray Chest AP Portable (Preliminary result)  Result time 04/03/25 20:37:59      Wet Read by Arya Steward DO (04/03/25 20:37:59, [facility] Emergency Department, Emergency Medicine)    Right lower lobe infiltrate.  No pneumothorax.                                     Medications   cefTRIAXone injection 1 g (1 g Intravenous Given 4/3/25 2126)   azithromycin (ZITHROMAX) 500 mg in 0.9% NaCl 250 mL IVPB (admixture device) (500 mg Intravenous New Bag 4/3/25 2126)   sodium chloride 0.9% bolus 1,000 mL 1,000 mL (1,000 mLs Intravenous New Bag 4/3/25 1949)     Medical Decision Making  Amount and/or Complexity of Data Reviewed  Labs: ordered.  Radiology: ordered and independent interpretation performed.    Risk  Prescription drug management.               ED Course as of 04/03/25 2221   Thu Apr 03, 2025 2054 The patient has Acute Chest Syndrome. IV antibiotics initiated.  [DH]   2116 Discussed with Dr. Gresham. Dr. Gresham advises transfer for OB/GYN. []   2203 Discussed with Dr. Allen at [facility] in [place] who accepts transfer. [DH]      ED Course User Index  [DH] Arya Setward DO                           Clinical Impression:  Final diagnoses:  [R07.9] Chest pain  [D57.01] Acute chest syndrome (Primary)          ED Disposition Condition    Transfer to Another Facility Stable                    [1]   Social History  Tobacco Use    Smoking status: Never    Smokeless tobacco: Never   Substance Use Topics    Alcohol use: No     Alcohol/week: 0.0 standard drinks of alcohol     Comment: occ    Drug use: No        Arya Stewadr DO  04/03/25 2221

## 2025-04-04 NOTE — H&P
[facility]  Maternal & Fetal Medicine  History & Physical    Patient Name: Boni Duarte  MRN: [id number]  Admission Date: 2025  Attending Physician: Jacky Aragon III, *   Primary Care Provider: Cal Clay MD    Subjective:     Principal Problem:Acute chest syndrome    HPI: Ms. Boni Duarte is a 34 y.o. [id number] at 21w5d who presents as a transfer from OSH for management of vaso-occlusive crisis with suspicion for acute chest syndrome in the setting of known sickle cell disease.     This IUP is complicated by Sickle Cell Disease.    Patient presented to OSH with chief complaint of chest pain for the past three days. She describes it as diffuse, midsternal, throbbing pain with no other associated symptoms. At OSH, patient's VSS, afebrile. Negative troponin. Leukocytosis of 14.5 noted. H/H 8.8/25 (baseline 7.5/20). Ferritin 447. Reticulocyte count 14.8. CXR revealed heterogenous opacities which may reflect atelectasis or pneumonia. Patient was initated on IV antibiotics and mIVF for treatment of acute chest syndrome and then transferred to [facility] for higher level of care.    Here, patient reports chest and back pain since Tuesday that has not resolved with PO tylenol administration. She denies fevers, chills, nausea/vomiting, or urinary symptoms. She reports her sickle cell disease is well-controlled with minimal hospital admissions. She has not required any transfusions within the past year. She is unsure of her possible triggers, but possibly attributes it to colder weather. Patient reports history of ACS in her chart but denies this.     Patient follows with hematology/oncology outpatient with Dr. Holguin.     OB History    Para Term  AB Living   2 1 1 0 0 1   SAB IAB Ectopic Multiple Live Births   0 0 0 0 1      # Outcome Date GA Lbr Narciso/2nd Weight Sex Type Anes PTL Lv   2 Current            1 Term 20 38w3d   F Vag-Spont   MARILYN     Past Medical  History:   Diagnosis Date    Anemia     Encounter for blood transfusion     Pneumonia     Pregnancy         Sickle cell anemia     Sickle cell disease 2015    UTI (urinary tract infection)        Past Surgical History:   Procedure Laterality Date    CHOLECYSTECTOMY         Review of patient's allergies indicates:  No Known Allergies    Prescriptions Prior to Admission[1]  Family History       Problem Relation (Age of Onset)    No Known Problems Mother, Father          Tobacco Use    Smoking status: Never    Smokeless tobacco: Never   Substance and Sexual Activity    Alcohol use: No     Alcohol/week: 0.0 standard drinks of alcohol     Comment: occ    Drug use: No    Sexual activity: Yes     Partners: Male     Birth control/protection: None     Review of Systems   Constitutional:  Negative for chills and fever.   Respiratory:  Negative for shortness of breath.    Cardiovascular:  Positive for chest pain.   Gastrointestinal:  Negative for abdominal pain, diarrhea and vomiting.   Genitourinary:  Negative for vaginal bleeding and vaginal discharge.   Musculoskeletal:  Positive for back pain.   Neurological:  Negative for headaches.   Psychiatric/Behavioral:  Negative for depression. The patient is not nervous/anxious.      Objective:     Vital Signs (24h Range):  Temp:  [98.5 °F (36.9 °C)-98.8 °F (37.1 °C)] 98.5 °F (36.9 °C)  Pulse:  [] 88  Resp:  [18-29] 20  SpO2:  [96 %-100 %] 96 %  BP: (109-132)/(60-80) 114/72     Physical Exam:   Constitutional: She appears well-developed and well-nourished. No distress.   2L NC in place    HENT:   Head: Normocephalic and atraumatic.    Eyes: Conjunctivae are normal.      Pulmonary/Chest: Effort normal. No respiratory distress.                       Skin: Skin is warm and dry.    Psychiatric: She has a normal mood and affect. Her behavior is normal. Thought content normal.       Significant Labs: CBC:    Recent Labs   Lab 25   WBC 14.45*   HGB 8.8*   HCT  24.7*       and CMP:   Recent Labs   Lab 25  1912   *   K 3.7      CO2 21*   BUN 5*   CREATININE 0.5   CALCIUM 8.5*   ALBUMIN 2.9*   BILITOT 2.3*   ALKPHOS 84   AST 39   ALT 13   ANIONGAP 10     FHT: 163bpm    X-Ray Chest PA And Lateral  Result Date: 2025  Bibasilar opacities, possibly representing sequela of atelectasis, noting that superimposed edema or infectious or non infectious inflammatory infiltrate cannot be excluded.  Correlation is advised. Electronically signed by: Smooth Forrest Date:    2025 Time:    10:31    X-Ray Chest AP Portable  Result Date: 4/3/2025  As above. Electronically signed by: Arthur Guillory MD Date:    2025 Time:    23:12        Assessment/Plan:   21w5d weeks gestation presents for:    Active Diagnoses:    Diagnosis Date Noted POA    PRINCIPAL PROBLEM:  Acute chest syndrome [D57.01] 2015 Yes    High-risk pregnancy in second trimester [O09.92] 2019 Not Applicable    Iron overload [E83.19] 2015 Yes    Sickle cell pain crisis [D57.00] 2015 Yes    Sickle cell disease [D57.1] 2015 Yes      Problems Resolved During this Admission:       Ms. Boni Duarte is a 34 y.o. [id number] at 21w5d who presents as a transfer from OSH for management of acute chest syndrome in the setting of known sickle cell disease.    Acute Chest Syndrome  - VSS, afebrile  - PE findings as above, 2L NC in place  - Work-up from OSH: Negative troponin. Leukocytosis of 14.5 noted. H/H 8.8/25 (baseline 7.5/20). Ferritin 447. Reticulocyte count 14.8. CXR revealed heterogenous opacities which may reflect atelectasis or pneumonia. Blood cultures pending.   Plan:   - Covid/Flu/Resp panel and Urine culture ordered  - Continue ceftriaxone/azithromycin   - CBC ordered   - mIVF LR @ 125cc/hr   - 1u pRBC held   - Repeat CXR ordered   - Maintain O2 sat > 95%   - Diet: Regular   - Monitoring: FHT qD    Sickle Cell Disease  - Hematologist: Dr. Holguin  - Baseline Hgb:  7-8  - Admission CBC and Ferritin ordered  - Home regimen: Tylenol PRN  - Tylenol PRN, Oxy 5/10 PRN ordered    21 weeks gestation of pregnancy   - Primary OB: Maximilian Desir MD  - Delivery consents, including vaginal, operative, and , and blood consents reviewed and signed at time of admission.   - Presentation: cephalic; If moving forward with delivery, will rescan to determine fetal pesentation   - Growth Ultrasound: 392g @ 20w6d  - Diet: Regular  - Monitoring: FHT qD  - Aneuploidy Screening: Neg MT21  - 1hr GTT: not yet indicated   - GBS: unknown   - Tdap: Needs between 27-36 weeks   - Continue PNV  - Contraception: Undecided; Will continue to discuss       Nan Todd MD  Maternal & Fetal Medicine  [facility]         [1]   Medications Prior to Admission   Medication Sig Dispense Refill Last Dose/Taking    folic acid (FOLVITE) 1 MG tablet Take 4 tablets (4 mg total) by mouth once daily. 100 tablet 3     PNV,calcium 72/iron/folic acid (PRENATAL VITAMIN) Tab Take 1 tablet by mouth once daily.

## 2025-04-04 NOTE — Clinical Note
April 7, 2025         [address]  [place] [address]  Phone: [phone]  Fax: [phone]       Patient: Boni Duarte   YOB: 1990  Date of Visit: 04/07/2025    To Whom It May Concern:    Sara Duarte  was at [facility] on 04/07/2025. The patient may return to work/school on *** {With/no:45563} restrictions. If you have any questions or concerns, or if I can be of further assistance, please do not hesitate to contact me.    Sincerely,    Aric Alas RN

## 2025-04-05 LAB — BACTERIA UR CULT: NO GROWTH

## 2025-04-05 PROCEDURE — 99232 SBSQ HOSP IP/OBS MODERATE 35: CPT | Mod: ,,, | Performed by: OBSTETRICS & GYNECOLOGY

## 2025-04-05 PROCEDURE — 63600175 PHARM REV CODE 636 W HCPCS

## 2025-04-05 PROCEDURE — 11000001 HC ACUTE MED/SURG PRIVATE ROOM

## 2025-04-05 PROCEDURE — 25000003 PHARM REV CODE 250

## 2025-04-05 RX ORDER — AZITHROMYCIN 250 MG/1
500 TABLET, FILM COATED ORAL NIGHTLY
Status: DISCONTINUED | OUTPATIENT
Start: 2025-04-06 | End: 2025-04-07 | Stop reason: HOSPADM

## 2025-04-05 RX ADMIN — AZITHROMYCIN MONOHYDRATE 500 MG: 500 INJECTION, POWDER, LYOPHILIZED, FOR SOLUTION INTRAVENOUS at 08:04

## 2025-04-05 RX ADMIN — SODIUM CHLORIDE, POTASSIUM CHLORIDE, SODIUM LACTATE AND CALCIUM CHLORIDE: 600; 310; 30; 20 INJECTION, SOLUTION INTRAVENOUS at 10:04

## 2025-04-05 RX ADMIN — SODIUM CHLORIDE, POTASSIUM CHLORIDE, SODIUM LACTATE AND CALCIUM CHLORIDE: 600; 310; 30; 20 INJECTION, SOLUTION INTRAVENOUS at 02:04

## 2025-04-05 RX ADMIN — FOLIC ACID 4 MG: 1 TABLET ORAL at 09:04

## 2025-04-05 RX ADMIN — PRENATAL VIT W/ FE FUMARATE-FA TAB 27-0.8 MG 1 TABLET: 27-0.8 TAB at 09:04

## 2025-04-05 RX ADMIN — CEFTRIAXONE SODIUM 2 G: 2 INJECTION, POWDER, FOR SOLUTION INTRAMUSCULAR; INTRAVENOUS at 09:04

## 2025-04-05 NOTE — NURSING
1900 Received reports from Aric FIGUEROA RN. Plan of care assumed.      1935 RN to bedside.    2000 FHT of 164 bpm per doppler.

## 2025-04-05 NOTE — PROGRESS NOTES
PROGRESS NOTE  ANTEPARTUM  Admit Date: 2025   LOS: 1 day     Reason for Admission:  Acute chest syndrome    SUBJECTIVE:     34 y.o.  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 secondary to vaso-occlusive crisis with concern for acute chest syndrome. Patient was afebrile at the time of admission but CXR was obtained due to reports of chest pain. CXR remarkable for bibasilar infiltrates, ceftriaxone and azithromycin started secondary to concern for acute chest syndrome. Was placed on 2L O2 per NC to maintain oxygen saturations >95%.     This morning patient reports improvement in chest pain. Denies SOB, currently on NC 2L (wearing intermittently) Denies None contractions, No vaginal bleeding , No loss of fluid    Scheduled Meds:   azithromycin  500 mg Intravenous Q24H    cefTRIAXone (Rocephin) IV (PEDS and ADULTS)  2 g Intravenous Q24H    folic acid  4 mg Oral Daily    prenatal vitamin  1 tablet Oral Daily     Continuous Infusions:   lactated ringers   Intravenous Continuous 125 mL/hr at 25 0135 Rate Verify at 25 013     PRN Meds:  Current Facility-Administered Medications:     0.9%  NaCl infusion (for blood administration), , Intravenous, Q24H PRN    acetaminophen, 650 mg, Oral, Q6H PRN    diphenhydrAMINE, 25 mg, Oral, Q4H PRN    diphenhydrAMINE, 25 mg, Intravenous, Q4H PRN    ondansetron, 8 mg, Oral, Q8H PRN    oxyCODONE, 5 mg, Oral, Q4H PRN    oxyCODONE, 10 mg, Oral, Q6H PRN    senna-docusate, 1 tablet, Oral, Nightly PRN    simethicone, 1 tablet, Oral, Q6H PRN    sodium chloride 0.9%, 10 mL, Intravenous, PRN    Review of patient's allergies indicates:  No Known Allergies    OBJECTIVE:     Vital Signs (Most Recent)  Temp: 99 °F (37.2 °C) (25 0000)  Pulse: 108 (25)  Resp: 20 (25 0000)  BP: 117/62 (25)  SpO2: 95 % (25)    Temperature Range Min/Max (Last 24H):  Temp:  [98.5 °F (36.9 °C)-99 °F  "(37.2 °C)]     Vital Signs Range (Last 24H):  Temp:  [98.5 °F (36.9 °C)-99 °F (37.2 °C)]   Pulse:  []   Resp:  [19-28]   BP: (103-117)/(53-74)   SpO2:  [90 %-100 %]     I & O (Last 24H):  Intake/Output Summary (Last 24 hours) at 2025 0151  Last data filed at 2025 0135  Gross per 24 hour   Intake 2130.65 ml   Output --   Net 2130.65 ml       Physical Exam:  General: well developed, well nourished  Lungs: normal respiratory effort, on 2L NC  Abdomen: soft non tender non distended.  Extremities: no edema     FHT 160s  (4/5 AM)    Laboratory:  CBC:   Recent Labs   Lab 25   WBC 14.45*   RBC 2.87*   HGB 8.8*   HCT 24.7*      MCV 86   MCH 30.7   MCHC 35.6     CMP:   Recent Labs   Lab 25   CALCIUM 8.5*   ALBUMIN 2.9*   *   K 3.7   CO2 21*      BUN 5*   CREATININE 0.5   ALKPHOS 84   ALT 13   AST 39   BILITOT 2.3*     Cardiac markers: No results for input(s): "CKMB", "CPKMB", "TROPONINT", "TROPONINI", "MYOGLOBIN" in the last 168 hours.  Cardiac markers:   Recent Labs   Lab 25   TROPONINIHS <3     Microbiology Results (last 7 days)       Procedure Component Value Units Date/Time    Respiratory Infection Panel (PCR), Nasopharyngeal [[id number]] Collected: 25    Order Status: Sent Specimen: Nasopharyngeal Swab Updated: 25 1000    Urine Culture High Risk [[id number]] Collected: 25    Order Status: Sent Specimen: Urine Updated: 2558              ASSESSMENT/PLAN:     Active Hospital Problems    Diagnosis  POA    *Acute chest syndrome [D57.01]  Yes    High-risk pregnancy in second trimester [O09.92]  Not Applicable    Iron overload [E83.19]  Yes    Sickle cell pain crisis [D57.00]  Yes    Sickle cell disease [D57.1]  Yes      Resolved Hospital Problems   No resolved problems to display.     Assessment:   Ms. Plata LEYDI Gerald is a 34 y.o. [id number] at 21w6d who presents as a transfer from OSH for management of acute chest " syndrome in the setting of known sickle cell disease.     Acute Chest Syndrome  - VSS, afebrile  - PE findings as above, 2L NC in place. Weaning off the oxygen as tolerated.   - Work-up from OSH: Negative troponin. Leukocytosis of 14.5 noted. H/H 8.8/25 (baseline 7.5/20). Ferritin 447. Reticulocyte count 14.8. CXR revealed heterogenous opacities which may reflect atelectasis or pneumonia. Blood cultures pending.   Plan:              - Resp panel and Urine culture pending  - Continue ceftriaxone/azithromycin              - mIVF LR @ 125cc/hr              - 1u pRBC held              - Maintain O2 sat > 95%              - Diet: Regular              - Monitoring: FHT qD     Sickle Cell Disease  - Hematologist: Dr. Holguin  - Baseline Hgb: 7-8  - Admission CBC and Ferritin ordered  - Home regimen: Tylenol PRN  - Tylenol PRN, Oxy 5/10 PRN ordered     21 weeks gestation of pregnancy   - Primary OB: Maximilian Desir MD  - Delivery consents, including vaginal, operative, and , and blood consents reviewed and signed at time of admission.   - Presentation: cephalic; If moving forward with delivery, will rescan to determine fetal pesentation   - Growth Ultrasound: 392g @ 20w6d  - Diet: Regular  - Monitoring: FHT qD  - Aneuploidy Screening: Neg MT21  - 1hr GTT: not yet indicated   - GBS: unknown   - Tdap: Needs between 27-36 weeks   - Continue PNV  - Contraception: Undecided; Will continue to discuss       Young Tarango MD  Obstetrics and Gynecology- PGY2     Fellow attestation. Patient is a 34 y.o.  at 21w6d admitted to [facility] service for concern for acute chest syndrome after presenting to outside hospital with symptoms of vaso-occlusive crisis and finding of bilateral infiltrates on chest xray. Fortunately, Ms. Duarte' reports of pain has continued to improve, with minimal pain reported this morning. She has continued on IV antibiotics for empiric treatment of ACS. She denies any shortness of breath, but  has continued to require O2 to maintain oxygen saturations >95%.      Temp:  [98.2 °F (36.8 °C)-99 °F (37.2 °C)] 98.2 °F (36.8 °C)  Pulse:  [] 94  Resp:  [18-20] 18  SpO2:  [90 %-100 %] 98 %  BP: (103-117)/(53-72) 109/59    Reviewed plan for continued management of ACS and vaso-occlusive crisis in HgSS disease. She has had minimal narcotic pain medication usage. Encouraged patient to utilize supplemental oxygen to maintain oxygen at goal of 95%. Will order incentive spirometer (use encouraged). If no improvement in oxygen requirements will consider repeat chest xray or consultation with Pulmonology.     Nikki Graham MD  PGY 7  Maternal Fetal Medicine  [facility]

## 2025-04-05 NOTE — PLAN OF CARE
Received pt conscious and alert, breathing spontaneously on room air; in no obvious distress. Pt reviewed at bedside by MD. See Notes.  LR via PIV to Rt A/C continues  02 via NC continues; weaned as per MD order to 1 then back to 1.5 L/m .  Incentive spirometer tolerated; encouraged to attained goal volume.  VSS; episodes of tachy and hypoxia with talking and ambulating.   Cat 1 tracing  Maternal-fetal well-being maintained this shift, as evidence by nursing assessments and interventions charted. Pt cooperative and tolerated well. Management continues.

## 2025-04-06 PROCEDURE — 63600175 PHARM REV CODE 636 W HCPCS

## 2025-04-06 PROCEDURE — 99232 SBSQ HOSP IP/OBS MODERATE 35: CPT | Mod: ,,, | Performed by: OBSTETRICS & GYNECOLOGY

## 2025-04-06 PROCEDURE — 63700000 PHARM REV CODE 250 ALT 637 W/O HCPCS: Performed by: OBSTETRICS & GYNECOLOGY

## 2025-04-06 PROCEDURE — 25000003 PHARM REV CODE 250

## 2025-04-06 PROCEDURE — 11000001 HC ACUTE MED/SURG PRIVATE ROOM

## 2025-04-06 RX ADMIN — FOLIC ACID 4 MG: 1 TABLET ORAL at 09:04

## 2025-04-06 RX ADMIN — PRENATAL VIT W/ FE FUMARATE-FA TAB 27-0.8 MG 1 TABLET: 27-0.8 TAB at 09:04

## 2025-04-06 RX ADMIN — CEFTRIAXONE SODIUM 2 G: 2 INJECTION, POWDER, FOR SOLUTION INTRAMUSCULAR; INTRAVENOUS at 10:04

## 2025-04-06 RX ADMIN — AZITHROMYCIN DIHYDRATE 500 MG: 250 TABLET ORAL at 09:04

## 2025-04-06 NOTE — PROGRESS NOTES
Pharmacist Intervention IV to PO Note    Boni Duarte is a 34 y.o. female being treated with IV medication azithromycin    Patient Data:    Vital Signs (Most Recent):  Temp: 99 °F (37.2 °C) (04/05/25 2000)  Pulse: 108 (04/05/25 2000)  Resp: 20 (04/05/25 2000)  BP: 107/60 (04/05/25 2000)  SpO2: 97 % (04/05/25 2000) Vital Signs (72h Range):  Temp:  [97.6 °F (36.4 °C)-99.5 °F (37.5 °C)]   Pulse:  []   Resp:  [16-29]   BP: (103-132)/(53-80)   SpO2:  [90 %-100 %]      CBC:  Recent Labs   Lab 04/03/25 1912   WBC 14.45*   RBC 2.87*   HGB 8.8*   HCT 24.7*      MCV 86   MCH 30.7   MCHC 35.6     CMP:     Recent Labs   Lab 04/03/25 1912   CALCIUM 8.5*   ALBUMIN 2.9*   *   K 3.7   CO2 21*      BUN 5*   CREATININE 0.5   ALKPHOS 84   ALT 13   AST 39   BILITOT 2.3*       Dietary Orders:  Diet Orders            Diet Adult Regular: Regular starting at 04/04 0858            Based on the following criteria, this patient qualifies for intravenous to oral conversion:  [x] The patients gastrointestinal tract is functioning (tolerating medications via oral or enteral route for 24 hours and tolerating food or enteral feeds for 24 hours).  [x] The patient is hemodynamically stable for 24 hours (heart rate <100 beats per minute, systolic blood pressure >99 mm Hg, and respiratory rate <20 breaths per minute).  [x] The patient shows clinical improvement (afebrile for at least 24 hours and white blood cell count downtrending or normalized). Additionally, the patient must be non-neutropenic (absolute neutrophil count >500 cells/mm3).  [x] For antimicrobials, the patient has received IV therapy for at least 24 hours.    IV medication azithromycin will be changed to oral medication azithromycin    Pharmacist's Name: Rosario Gaspar  Pharmacist's Extension: [phone]

## 2025-04-06 NOTE — PLAN OF CARE
Problem: Adult Inpatient Plan of Care  Goal: Plan of Care Review  Outcome: Progressing  Goal: Patient-Specific Goal (Individualized)  Outcome: Progressing  Goal: Absence of Hospital-Acquired Illness or Injury  Outcome: Progressing  Goal: Optimal Comfort and Wellbeing  Outcome: Progressing  Goal: Readiness for Transition of Care  Outcome: Progressing     Problem:  Fall Injury Risk  Goal: Absence of Fall, Infant Drop and Related Injury  Outcome: Progressing     Problem: Infection  Goal: Absence of Infection Signs and Symptoms  Outcome: Progressing     Problem: Hospitalized  Patient  Goal: Optimal Patient-Fetal Wellbeing  Outcome: Progressing     Problem: Gas Exchange Impaired  Goal: Optimal Gas Exchange  Outcome: Progressing

## 2025-04-06 NOTE — NURSING
Received patient conscious, coherent with ongoing fluids and hooked on continuous pulse oximeter and nasal cannula at 1.5LPM. Patient denies headache, chest pain, shortness of breath, contraction, nausea, vomiting and vaginal bleeding. Instructed to inform us once with symptoms and patient agreed. Rechecked fetal heart tone and obtained 160's bpm

## 2025-04-06 NOTE — PROGRESS NOTES
PROGRESS NOTE  ANTEPARTUM  Admit Date: 2025   LOS: 2 days     Reason for Admission:  Acute chest syndrome    SUBJECTIVE:     34 y.o.  with HbSS disease at 22w0d who originally presented as a maternal transport from [facility] secondary to vaso-occlusive crisis with concern for acute chest syndrome. Patient was afebrile at the time of admission but CXR was obtained due to reports of chest pain. CXR remarkable for bibasilar infiltrates, ceftriaxone and azithromycin started secondary to concern for acute chest syndrome. Was placed on 2L O2 per NC to maintain oxygen saturations >95%.     Currently denies any chest pain. Denies SOB, currently on NC 1.5L. Denies contractions, No vaginal bleeding , No loss of fluid. Using incentive spirometer.     Scheduled Meds:   azithromycin  500 mg Oral QHS    cefTRIAXone (Rocephin) IV (PEDS and ADULTS)  2 g Intravenous Q24H    folic acid  4 mg Oral Daily    prenatal vitamin  1 tablet Oral Daily     Continuous Infusions:   lactated ringers   Intravenous Continuous 125 mL/hr at 25 Rate Verify at 25 0158     PRN Meds:  Current Facility-Administered Medications:     0.9%  NaCl infusion (for blood administration), , Intravenous, Q24H PRN    acetaminophen, 650 mg, Oral, Q6H PRN    diphenhydrAMINE, 25 mg, Oral, Q4H PRN    diphenhydrAMINE, 25 mg, Intravenous, Q4H PRN    ondansetron, 8 mg, Oral, Q8H PRN    oxyCODONE, 5 mg, Oral, Q4H PRN    oxyCODONE, 10 mg, Oral, Q6H PRN    senna-docusate, 1 tablet, Oral, Nightly PRN    simethicone, 1 tablet, Oral, Q6H PRN    sodium chloride 0.9%, 10 mL, Intravenous, PRN    Review of patient's allergies indicates:  No Known Allergies    OBJECTIVE:     Vital Signs (Most Recent)  Temp: 98.2 °F (36.8 °C) (25)  Pulse: 92 (25)  Resp: 20 (25)  BP: (!) 102/59 (25)  SpO2: 98 % (25)    Temperature Range Min/Max (Last 24H):  Temp:  [97.6 °F (36.4 °C)-99.5 °F (37.5 °C)]     Vital  "Signs Range (Last 24H):  Temp:  [97.6 °F (36.4 °C)-99.5 °F (37.5 °C)] 98.2 °F (36.8 °C)  Pulse:  [] 92  Resp:  [16-20] 20  SpO2:  [92 %-100 %] 98 %  BP: (102-122)/(55-65) 102/59      I & O (Last 24H):  Intake/Output Summary (Last 24 hours) at 2025 0549  Last data filed at 2025 0516  Gross per 24 hour   Intake 3767.2 ml   Output 3250 ml   Net 517.2 ml       Physical Exam:  General: well developed, well nourished  Lungs: normal respiratory effort, on 1.5L NC  Abdomen: soft non tender non distended.  Extremities: no edema     FHT 150s  (4/5 AM)    Laboratory:  CBC:   Recent Labs   Lab 25   WBC 14.45*   RBC 2.87*   HGB 8.8*   HCT 24.7*      MCV 86   MCH 30.7   MCHC 35.6     CMP:   Recent Labs   Lab 25   CALCIUM 8.5*   ALBUMIN 2.9*   *   K 3.7   CO2 21*      BUN 5*   CREATININE 0.5   ALKPHOS 84   ALT 13   AST 39   BILITOT 2.3*     Cardiac markers: No results for input(s): "CKMB", "CPKMB", "TROPONINT", "TROPONINI", "MYOGLOBIN" in the last 168 hours.  Cardiac markers:   Recent Labs   Lab 25   TROPONINIHS <3     Microbiology Results (last 7 days)       Procedure Component Value Units Date/Time    Urine Culture High Risk [[id number]] Collected: 25    Order Status: Completed Specimen: Urine Updated: 25     Urine Culture No Growth    Respiratory Infection Panel (PCR), Nasopharyngeal [[id number]] Collected: 25    Order Status: Sent Specimen: Nasopharyngeal Swab Updated: 25 1000              ASSESSMENT/PLAN:     Active Hospital Problems    Diagnosis  POA    *Acute chest syndrome [D57.01]  Yes    High-risk pregnancy in second trimester [O09.92]  Not Applicable    Iron overload [E83.19]  Yes    Sickle cell pain crisis [D57.00]  Yes    Sickle cell disease [D57.1]  Yes      Resolved Hospital Problems   No resolved problems to display.     Assessment:   Ms. Evajenny LEYDI Duarte is a 34 y.o. [id number] at 22w0d who presents as a " transfer from OSH for management of possible acute chest syndrome in the setting of known sickle cell disease.     Acute Chest Syndrome  - VSS, afebrile  - PE findings as above, 1.5L NC in place. Weaning off the oxygen as tolerated.   - Work-up from OSH: Negative troponin. Leukocytosis of 14.5 noted. H/H 8.8/25 (baseline 7.5/20). Ferritin 447. Reticulocyte count 14.8. CXR revealed heterogenous opacities which may reflect atelectasis or pneumonia. Blood cultures no growth after 36 hrs   Plan:              - Resp panel pending   - Urine culture negative/no growth  - Continue ceftriaxone/azithromycin              - mIVF LR @ 125cc/hr               - 1u pRBC held              - Maintain O2 sat > 95%              - Diet: Regular              - Monitoring: FHT qD   - Incentive spirometry use with improvement in O2 requirements     Sickle Cell Disease  - Hematologist: Dr. Holguin  - Baseline Hgb: 7-8  - Home regimen: Tylenol PRN  - Tylenol PRN, Oxy 5/10 PRN ordered     22 weeks gestation of pregnancy   - Primary OB: Maximilian Desir MD  - Delivery consents, including vaginal, operative, and , and blood consents reviewed and signed at time of admission.   - Presentation: cephalic; If moving forward with delivery, will rescan to determine fetal presentation   - Growth Ultrasound: 392g @ 20w6d  - Diet: Regular  - Monitoring: FHT qD  - Aneuploidy Screening: Neg MT21  - 1hr GTT: not yet indicated   - GBS: unknown   - Tdap: Needs between 27-36 weeks   - Continue PNV  - Contraception: Undecided; Will continue to discuss       Radha Lucas MD, MPH  OBGYN PGY-4    Fellow attestation. Patient is a 34 y.o.  at 22w0d admitted to [facility] service for concern for acute chest syndrome after presenting to outside hospital with symptoms of vaso-occlusive crisis and finding of bilateral infiltrates on chest xray. Fortunately, Ms. Duarte' reports of pain have essentially resolved. She has continued on IV antibiotics for empiric  treatment of ACS. She denies any shortness of breath, but has continued to require O2 to maintain oxygen saturations >95%.      Temp:  [98.2 °F (36.8 °C)-99.5 °F (37.5 °C)] 98.3 °F (36.8 °C)  Pulse:  [] 99  Resp:  [16-20] 19  SpO2:  [92 %-100 %] 100 %  BP: ()/(54-65) 90/54    Reviewed plan for continued management of ACS and vaso-occlusive crisis in HbSS disease. She has had minimal narcotic pain medication usage. Encouraged patient to utilize incentive spirometer. Will reach out to Pulmonology team for additional input as patient has continued to require supplemental oxygen despite several days of treatment with antibiotics and improvement in clinical symptoms.     Nikki Graham MD  PGY 7  Maternal Fetal Medicine  [facility]

## 2025-04-06 NOTE — CONSULTS
"PULMONARY & CRITICAL CARE MEDICINE  Progress Note     Subjective  History:  Ms. Duarte is a 34 y.o. woman who is , currently 22w gravid, with a history of HbS disease. She presented to an OSH with throbbing central chest pain, occasionally radiating to her back. There was no associated dyspnea. She has rare vaso-occlusive crises (several years since last episode) and says that this pain is typical for her vaso-occlusive crises. No fever, chills, rigors, malaise, weakness, rhinorrhea, sore throat, cough, hemoptysis, abdominal pain, nausea, vomiting, diarrhea, myalgias, arthralgias. Mildly hypoxemic on 1 L NC. Daquan sat at rest 94% when taken off of O2.     Granulocyte-predominant leukocytosis. Afebrile. TnI < 3. Plain film with bibasilar airspace disease that is favored to represent atelectasis.     PMHx:   HbSS disease with rare vaso-occlusive crises and remote ACS requiring mechanical ventilation    PSHx:   S/p cholecystectomy     Social History:  She has a five-year old child at home.   Never tobacco. Rare alcohol use when not pregnant.     Current Medications[1]    Review of patient's allergies indicates:  No Known Allergies    Objective:   BP (!) 90/54   Pulse 99   Temp 98.3 °F (36.8 °C) (Oral)   Resp 19   Ht 5' 7" (1.702 m)   Wt 79.8 kg (175 lb 14.8 oz)   LMP 2024 (Approximate)   SpO2 100%   BMI 27.55 kg/m²    CONSTITUTIONAL: Healthy-appearing pregnant female.   HEENT: Normocephalic. External ears normal. Extraocular movements intact.   CV: Regular rate and rhythm. Symmetric radial pulses. Warm extremities. Normal capillary refill.    PULM: Symmetric chest wall excursion. No accessory muscle use. No wheezes or ronchi. Rare crackles lower lung field R posterior chest.   GI: Abdomen is soft and nondistended. No tenderness.   MSK: No gross deformity.    NEURO: Alert and conversant. No facial asymmetry or dysarthria. Moves all extremities.   DERM: No rash or ecchymoses.   PSYCH: Normal mood " and affect.     Labs:   Granulocyte-predominant leukocytosis. Normocytic anemia stable from 4w prior.   Mild hypoalbuminemia. Tbili elevated.   Rare erythrocytes and leukocytes in UA. No proteinuria     Imaging:  Bibasilar airspace disease. R base looks more atelectatic     Assessment & Plan  # Hypoxemic respiratory failure, mild   # HbSS disease   - This is a very pleasant young woman in her second trimester who presented with chest pain that she describes as typical of her vaso-occlusive crises which are rare for her. Her review of systems is otherwise noncontributory. She has evidence of likely hemolysis on basis of bilirubin elevation. She had a normal electrocardiogram at presentation and the hs-TnI is undetectable (not SCAD). The airspace disease on the plain film looks more like atelectatic changes than true parenchymal disease to me.  However, I can't say that this is not acute chest syndrome on the basis of the parenchymal changes and hypoxemia. If this is ACS, then simple transfusion with goal Hgb 10 g/dL would be appropriate and contacting hematology if any clinical worsening for consideration of exchange transfusion. Venous thromboembolism considered less likely but in the differential particularly with the rare crackles R base which could be seen with wedge infarct though she has no pleurisy. She does not have evidence of volume overload. Clinically is doing well without complaint at present and kiah sat 94% when I weaned her to ambient air at bedside (replaced on 1 L NC)    RECOMMENDATIONS  - Consider simple transfusion with goal Hgb ~10 g/dL (avoid > 11 g/dL due to risk of hyperviscosity)  - Contact hematology if any clinical worsening for consideration of exchange transfusion   - Routine incentive spirometry and mobilization   - Continue ceftriaxone/azithromycin  - Consider lower extremity dopplers and ventilation/perfusion imaging (since CT not ideal in pregnancy). There is low pretest probability  of pulmonary embolus    Case to be discussed with Dr. Cameron, attending physician. Any changes in recommendations will be reported in an addendum. We'll continue to follow along.     Ricki DO Bob  11:21 AM 04/06/2025          [1]   Current Facility-Administered Medications:     0.9%  NaCl infusion (for blood administration), , Intravenous, Q24H PRN, Radha Lucas MD    acetaminophen tablet 650 mg, 650 mg, Oral, Q6H PRN, Radha Lucas MD    azithromycin tablet 500 mg, 500 mg, Oral, QHS, Jacky Aragon III, MD    cefTRIAXone injection 2 g, 2 g, Intravenous, Q24H, Young Crouch MD, 2 g at 04/05/25 2127    diphenhydrAMINE capsule 25 mg, 25 mg, Oral, Q4H PRN, Radha Lucas MD    diphenhydrAMINE injection 25 mg, 25 mg, Intravenous, Q4H PRN, Radha Lucas MD    folic acid tablet 4 mg, 4 mg, Oral, Daily, Radha Lucas MD, 4 mg at 04/06/25 0912    lactated ringers infusion, , Intravenous, Continuous, Radha Lucas MD, Last Rate: 125 mL/hr at 04/06/25 0158, Rate Verify at 04/06/25 0158    ondansetron disintegrating tablet 8 mg, 8 mg, Oral, Q8H PRN, Radha Lucas MD    oxyCODONE immediate release tablet 5 mg, 5 mg, Oral, Q4H PRN, Radha Lucas MD, 5 mg at 04/04/25 0928    oxyCODONE immediate release tablet Tab 10 mg, 10 mg, Oral, Q6H PRN, Radha Lucas MD    prenatal vitamin oral tablet, 1 tablet, Oral, Daily, Radha Lucas MD, 1 tablet at 04/06/25 0912    senna-docusate 8.6-50 mg per tablet 1 tablet, 1 tablet, Oral, Nightly PRN, Radha Lucas MD    simethicone chewable tablet 80 mg, 1 tablet, Oral, Q6H PRN, Radha Lucas MD    sodium chloride 0.9% flush 10 mL, 10 mL, Intravenous, PRN, Radha Lucas MD

## 2025-04-07 VITALS
DIASTOLIC BLOOD PRESSURE: 55 MMHG | HEART RATE: 96 BPM | TEMPERATURE: 98 F | RESPIRATION RATE: 17 BRPM | WEIGHT: 175.94 LBS | BODY MASS INDEX: 27.61 KG/M2 | SYSTOLIC BLOOD PRESSURE: 99 MMHG | HEIGHT: 67 IN | OXYGEN SATURATION: 100 %

## 2025-04-07 LAB
ABSOLUTE EOSINOPHIL (OHS): 0.27 K/UL
ABSOLUTE MONOCYTE (OHS): 1.38 K/UL (ref 0.3–1)
ABSOLUTE NEUTROPHIL COUNT (OHS): 8.79 K/UL (ref 1.8–7.7)
ADENOVIRUS: NOT DETECTED
BASOPHILS # BLD AUTO: 0.03 K/UL
BASOPHILS NFR BLD AUTO: 0.2 %
BORDETELLA PARAPERTUSSIS (IS1001): NOT DETECTED
BORDETELLA PERTUSSIS (PTXP): NOT DETECTED
CHLAMYDIA PNEUMONIAE: NOT DETECTED
CORONAVIRUS 229E, COMMON COLD VIRUS: NOT DETECTED
CORONAVIRUS HKU1, COMMON COLD VIRUS: NOT DETECTED
CORONAVIRUS NL63, COMMON COLD VIRUS: NOT DETECTED
CORONAVIRUS OC43, COMMON COLD VIRUS: NOT DETECTED
ERYTHROCYTE [DISTWIDTH] IN BLOOD BY AUTOMATED COUNT: 16.3 % (ref 11.5–14.5)
FLUBV RNA NPH QL NAA+NON-PROBE: NOT DETECTED
HCT VFR BLD AUTO: 20.7 % (ref 37–48.5)
HGB BLD-MCNC: 7.5 GM/DL (ref 12–16)
HPIV1 RNA NPH QL NAA+NON-PROBE: NOT DETECTED
HPIV2 RNA NPH QL NAA+NON-PROBE: NOT DETECTED
HPIV3 RNA NPH QL NAA+NON-PROBE: NOT DETECTED
HPIV4 RNA NPH QL NAA+NON-PROBE: NOT DETECTED
HUMAN METAPNEUMOVIRUS: NOT DETECTED
IMM GRANULOCYTES # BLD AUTO: 0.09 K/UL (ref 0–0.04)
IMM GRANULOCYTES NFR BLD AUTO: 0.7 % (ref 0–0.5)
INFLUENZA A: NOT DETECTED
LYMPHOCYTES # BLD AUTO: 1.99 K/UL (ref 1–4.8)
MCH RBC QN AUTO: 30.9 PG (ref 27–31)
MCHC RBC AUTO-ENTMCNC: 36.2 G/DL (ref 32–36)
MCV RBC AUTO: 85 FL (ref 82–98)
MYCOPLASMA PNEUMONIAE: NOT DETECTED
NUCLEATED RBC (/100WBC) (OHS): 1 /100 WBC
PLATELET # BLD AUTO: 365 K/UL (ref 150–450)
PMV BLD AUTO: 9.4 FL (ref 9.2–12.9)
RBC # BLD AUTO: 2.43 M/UL (ref 4–5.4)
RELATIVE EOSINOPHIL (OHS): 2.2 %
RELATIVE LYMPHOCYTE (OHS): 15.9 % (ref 18–48)
RELATIVE MONOCYTE (OHS): 11 % (ref 4–15)
RELATIVE NEUTROPHIL (OHS): 70 % (ref 38–73)
RESPIRATORY INFECTION PANEL SOURCE: NORMAL
RSV RNA NPH QL NAA+NON-PROBE: NOT DETECTED
RV+EV RNA NPH QL NAA+NON-PROBE: NOT DETECTED
SARS-COV-2 RNA RESP QL NAA+PROBE: NOT DETECTED
WBC # BLD AUTO: 12.55 K/UL (ref 3.9–12.7)

## 2025-04-07 PROCEDURE — 85025 COMPLETE CBC W/AUTO DIFF WBC: CPT

## 2025-04-07 PROCEDURE — 94618 PULMONARY STRESS TESTING: CPT

## 2025-04-07 PROCEDURE — 99900035 HC TECH TIME PER 15 MIN (STAT)

## 2025-04-07 PROCEDURE — 63600175 PHARM REV CODE 636 W HCPCS

## 2025-04-07 PROCEDURE — 25000003 PHARM REV CODE 250

## 2025-04-07 PROCEDURE — 99239 HOSP IP/OBS DSCHRG MGMT >30: CPT | Mod: ,,, | Performed by: OBSTETRICS & GYNECOLOGY

## 2025-04-07 RX ORDER — CEFTRIAXONE 2 G/1
2 INJECTION, POWDER, FOR SOLUTION INTRAMUSCULAR; INTRAVENOUS ONCE
Status: COMPLETED | OUTPATIENT
Start: 2025-04-07 | End: 2025-04-07

## 2025-04-07 RX ADMIN — FOLIC ACID 4 MG: 1 TABLET ORAL at 09:04

## 2025-04-07 RX ADMIN — PRENATAL VIT W/ FE FUMARATE-FA TAB 27-0.8 MG 1 TABLET: 27-0.8 TAB at 09:04

## 2025-04-07 RX ADMIN — CEFTRIAXONE SODIUM 2 G: 2 INJECTION, POWDER, FOR SOLUTION INTRAMUSCULAR; INTRAVENOUS at 11:04

## 2025-04-07 NOTE — DISCHARGE INSTRUCTIONS
Contact your primary OB or after hours at [phone] if you experience any of the following:    Contractions every 7-10 minutes for 1 or more hours.   A sudden gush or constant leaking of fluid.  Heavy vaginal bleeding.   If you experience a constant headache, blurry vision, pain underneath your right rib, or sudden swelling of your hands, feet, and face.   If you are 28 weeks pregnant or greater, you can measure kick counts with a goal of 10 or more movements within 2 hours.     Remember to stay hydrated; drink 8-10 bottles of water a day.     Maintain all follow-up appointments.

## 2025-04-07 NOTE — PROGRESS NOTES
"    [facility] Medicine Consult     Primary Attending:  Jacky Aragon III, *   Consultant Attending: Oriana Golden MD   Consultant Fellow: Elder Allen, HO-IV     2025   3       History of Present Illness      Ms. Duarte is a 34 y.o. woman who is , currently 22w gravid, with a history of HbS disease. She presented to an OSH with throbbing central chest pain, occasionally radiating to her back. There was no associated dyspnea. She has rare vaso-occlusive crises (several years since last episode) and says that this pain is typical for her vaso-occlusive crises. No fever, chills, rigors, malaise, weakness, rhinorrhea, sore throat, cough, hemoptysis, abdominal pain, nausea, vomiting, diarrhea, myalgias, arthralgias.      Daily Update:  States pain resolved Saturday. No further complaints at this time, she is really wanting to go home to her 5 year old daughter. Placed on RA this morning with SpO2 95%, remained at 96% on re-evaluation >1 hr later.        Vital Signs:  Temp:  [97.7 °F (36.5 °C)-98.8 °F (37.1 °C)]   Pulse:  []   Resp:  [16-17]   BP: ()/(52-58)   SpO2:  [92 %-100 %]     No results found for: "HTIN", "WEIGHT"    Intake / Output:    Intake/Output Summary (Last 24 hours) at 2025 1051  Last data filed at 2025 1458  Gross per 24 hour   Intake 708.21 ml   Output --   Net 708.21 ml       I have reviewed the vital trends as well as the documented I/Os     Physical Exam:  GEN: resting comfortably, NAD  HEENT: MMM, no scleral icterus, EOMI  NECK: Supple, midline trachea  CV: RRR, no MRG, pulses equal and symmetric 2+ at radial  PULM: Non distressed, satting 96% on RA. Crackles in the R lower zone  ABDOMEN: Soft, non-tender, non-distended, no rebound or guarding  SKIN: Warm, dry, intact, no rashes  MSK: No deformity, no clubbing, cyanosis, or lower extremity edema  NEURO: awake and following commands, at neurologic baseline  LINES: Intact, no extravasation or induration, no " erythema to PIV or support devices    All recent labs and imaging studies have been reviewed    Recent Labs   Lab 04/03/25 1912 04/07/25  0704   WBC 14.45* 12.55   HGB 8.8* 7.5*   HCT 24.7* 20.7*    365       Recent Labs   Lab 04/03/25 1912   *   K 3.7      CO2 21*   BUN 5*   CREATININE 0.5   CALCIUM 8.5*       Recent Labs   Lab 04/03/25 1912   ALBUMIN 2.9*   BILITOT 2.3*   AST 39   ALT 13   ALKPHOS 84           Microbiology:  RVP negative      I have personally and independently interpretted the following tests:    Labs / Images / Cultures     Assessment / Plan:     Boni Duarte admitted with  HgbSS and vaso-occlusive crisis concerning for acute chest syndrome (ACS). Clinically to me she has PNA which could qualify her as ACS which would warrant at least simple transfusion to Hgb goal 10. However, as she is doing much better it is not obvious she needs this.     Community acquired PNA w/ ? Acute chest syndrome  Much improved, should complete her Abx course today  Simple transfusion to goal Hgb 10 would be warranted if her working Dx is ACS however as she is better, holding off and have her f/u close with her hematologist would also be reasonable. Could also consider consulting hematology inpatient for their guidance given her pregnancy but do not feel she needs exchange transfusion at this time.   HgbSS w/ vaso-occlusive crisis, resolved   Pain controlled, has not needed meds in >2 days.   F/u with her home hematologist     This plan was rounded on with staff pulmonologist Dr. Golden and recommendations discussed with [facility] resident.     We will continue to sign off but please do not hesitate to reach out if there are any further concerns.     Please call or message directly through EPIC with any additional questions or concerns     Elder Allen, XAVIER  [facility] Fellow

## 2025-04-07 NOTE — PROCEDURES
"Instructions for measuring Oxygen Saturation  to qualify for Home Oxygen:    Please obtain and document (REPLACE # SIGNS AND COPY AND PASTE TEXT INSIDE QUOTATION MARKS) the following for Home Oxygen:    This must be performed and documented within 2 days of discharge.    "Pulse Oximetry:    97% SpO2 on room air at rest on 04/07/2025."                                 If 88% or less, STOP and document.     If 89% or more, measure and  document the following:    "Pulse Oximetry:   97%SpO2 on room air at rest on 04/07/2025                           96%SpO2  on room air with activity/exercise on 04/07/2025                       (NOTE:  FOR OXYGEN WITH ACTIVITY - MEDICARE WANTS TO SEE THAT THE OXYGEN INCREASES ONCE A PATIENT HAS WALKED AND IS BACK ON THE OXYGEN)        "

## 2025-04-07 NOTE — DISCHARGE SUMMARY
[facility] - Antepartum  Maternal & Fetal Medicine  Discharge Summary      Patient Name: Boni Duarte  MRN: [id number]  Admission Date: 2025  Hospital Length of Stay: 3 days  Discharge Date and Time: 2025  Attending Physician: Jacky Aragon III, *   Discharging Provider: Sarah Spangler MD  Primary Care Provider: Cal Clay MD    HPI:   34 y.o.  with HbSS disease at 22w0d who originally presented as a maternal transport from [facility] secondary to vaso-occlusive crisis with concern for acute chest syndrome. Patient was afebrile at the time of admission but CXR was obtained due to reports of chest pain. CXR remarkable for bibasilar infiltrates, ceftriaxone and azithromycin started secondary to concern for acute chest syndrome. Was placed on 2L O2 per NC to maintain oxygen saturations >95%.       Hospital Course:  Negative workup from OSH. Given additional course of ceftriaxone/azithromycin for treatment of possible CAP. Respiratory infection panel negative. Patient's oxygenation improved with IS. Pulmonology consulted, recommend full CAP treatment. No O2 requirement upon discharge, passed O2 walk test. FHTs confirmed prior to DC.    Consults:   Consults (From admission, onward)          Status Ordering Provider     Inpatient consult to Pulmonology  Once        Provider:  (Not yet assigned)    Completed GEOFFREY GESTEIRA, RAHEEL            Pertinant Diagnostic Studies:    Postpartum CBC  Lab Results   Component Value Date    WBC 12.55 2025    HGB 7.5 (L) 2025    HCT 20.7 (L) 2025    MCV 85 2025     2025       Pending Diagnostic Studies:       None          Final Active Diagnoses:    Diagnosis Date Noted POA    PRINCIPAL PROBLEM:  Acute chest syndrome [D57.01] 2015 Yes    High-risk pregnancy in second trimester [O09.92] 2019 Not Applicable    Iron overload [E83.19] 2015 Yes    Sickle cell pain crisis [D57.00] 2015 Yes     Sickle cell disease [D57.1] 05/27/2015 Yes      Problems Resolved During this Admission:      Discharged Condition: good    Disposition: Home or Self Care    Follow Up:   Follow-up Information       Mike Lopez MD. Go on 4/9/2025.    Specialty: Maternal and Fetal Medicine  Why: for hospital follow up  Contact information:  [address]  [facility] [address]  [phone]               Regina Beltran CNM. Go on 4/23/2025.    Specialties: Obstetrics and Gynecology, Family Medicine  Why: for hospital follow up, for prenatal visit  Contact information:  [address] [facility] [address]  [phone]               Meng Holguin MD. Go to.    Specialty: Hematology and Oncology  Why: for hospital follow up  Contact information:  [address]  [place] [address]  [phone]                           Patient Instructions:      Diet Adult Regular     Other restrictions (specify):     Notify your health care provider if you experience any of the following:  temperature >100.4     Notify your health care provider if you experience any of the following:  persistent nausea and vomiting or diarrhea     Notify your health care provider if you experience any of the following:  severe uncontrolled pain     Notify your health care provider if you experience any of the following:  redness, tenderness, or signs of infection (pain, swelling, redness, odor or green/yellow discharge around incision site)     Notify your health care provider if you experience any of the following:  severe persistent headache     Notify your health care provider if you experience any of the following:  persistent dizziness, light-headedness, or visual disturbances     Notify your health care provider if you experience any of the following:  increased confusion or weakness     Notify your health care provider if you experience any of the following:   Order Comments: Heavy vaginal bleeding, saturating more than two pads in  one hour     Activity as tolerated     Medications:  Reconciled Home Medications:      Medication List        CONTINUE taking these medications      folic acid 1 MG tablet  Commonly known as: FOLVITE  Take 4 tablets (4 mg total) by mouth once daily.     prenatal vitamin Tab  Take 1 tablet by mouth once daily.              Sarah Spangler MD  Maternal & Fetal Medicine  [facility] - Antepartum

## 2025-04-07 NOTE — PROGRESS NOTES
PROGRESS NOTE  ANTEPARTUM  Admit Date: 2025   LOS: 3 days     Reason for Admission:  Acute chest syndrome    SUBJECTIVE:     34 y.o.  with HbSS disease at 22w0d who originally presented as a maternal transport from [facility] secondary to vaso-occlusive crisis with concern for acute chest syndrome. Patient was afebrile at the time of admission but CXR was obtained due to reports of chest pain. CXR remarkable for bibasilar infiltrates, ceftriaxone and azithromycin started secondary to concern for acute chest syndrome. Was placed on 2L O2 per NC to maintain oxygen saturations >95%.     Currently denies any chest pain. Denies SOB, currently on NC 1L. Denies contractions, No vaginal bleeding , No loss of fluid. Using incentive spirometer.     Scheduled Meds:   azithromycin  500 mg Oral QHS    cefTRIAXone (Rocephin) IV (PEDS and ADULTS)  2 g Intravenous Once    folic acid  4 mg Oral Daily    prenatal vitamin  1 tablet Oral Daily     Continuous Infusions:  None    PRN Meds:  Current Facility-Administered Medications:     0.9%  NaCl infusion (for blood administration), , Intravenous, Q24H PRN    acetaminophen, 650 mg, Oral, Q6H PRN    diphenhydrAMINE, 25 mg, Oral, Q4H PRN    diphenhydrAMINE, 25 mg, Intravenous, Q4H PRN    ondansetron, 8 mg, Oral, Q8H PRN    oxyCODONE, 5 mg, Oral, Q4H PRN    oxyCODONE, 10 mg, Oral, Q6H PRN    senna-docusate, 1 tablet, Oral, Nightly PRN    simethicone, 1 tablet, Oral, Q6H PRN    sodium chloride 0.9%, 10 mL, Intravenous, PRN    Review of patient's allergies indicates:  No Known Allergies    OBJECTIVE:     Vital Signs (Most Recent)  Temp: 98.8 °F (37.1 °C) (25)  Pulse: 89 (25)  Resp: 17 (25)  BP: (!) 95/55 (25)  SpO2: 95 % (25)    Temperature Range Min/Max (Last 24H):  Temp:  [97.7 °F (36.5 °C)-98.8 °F (37.1 °C)]     Vital Signs Range (Last 24H):  Temp:  [97.7 °F (36.5 °C)-98.8 °F (37.1 °C)] 98.8 °F (37.1 °C)  Pulse:   "[] 89  Resp:  [16-17] 17  SpO2:  [92 %-100 %] 95 %  BP: ()/(52-58) 95/55      I & O (Last 24H):  Intake/Output Summary (Last 24 hours) at 4/7/2025 1124  Last data filed at 4/6/2025 1458  Gross per 24 hour   Intake 708.21 ml   Output --   Net 708.21 ml       Physical Exam:  General: well developed, well nourished  Lungs: normal respiratory effort, on 1.5L NC  Abdomen: soft non tender non distended.  Extremities: no edema     FHT 160s  (4/6 AM)    Laboratory:  CBC:   Recent Labs   Lab 04/07/25  0704   WBC 12.55   RBC 2.43*   HGB 7.5*   HCT 20.7*      MCV 85   MCH 30.9   MCHC 36.2*     CMP:   Recent Labs   Lab 04/03/25  1912   CALCIUM 8.5*   ALBUMIN 2.9*   *   K 3.7   CO2 21*      BUN 5*   CREATININE 0.5   ALKPHOS 84   ALT 13   AST 39   BILITOT 2.3*     Cardiac markers: No results for input(s): "CKMB", "CPKMB", "TROPONINT", "TROPONINI", "MYOGLOBIN" in the last 168 hours.  Cardiac markers:   Recent Labs   Lab 04/03/25  1912   TROPONINIHS <3     Microbiology Results (last 7 days)       Procedure Component Value Units Date/Time    Respiratory Infection Panel (PCR), Nasopharyngeal [[id number]] Collected: 04/04/25 0946    Order Status: Completed Specimen: Nasopharyngeal Swab Updated: 04/07/25 1031     Respiratory Infection Panel Source Nasopharyngeal Swab     Adenovirus Not Detected     Coronavirus 229E, Common Cold Virus Not Detected     Coronavirus HKU1, Common Cold Virus Not Detected     Coronavirus NL63, Common Cold Virus Not Detected     Coronavirus OC43, Common Cold Virus Not Detected     SARS-CoV2 (COVID-19) Qualitative PCR Not Detected     Human Metapneumovirus Not Detected     Human Rhinovirus/Enterovirus Not Detected     Influenza A Not Detected     Influenza B Not Detected     Parainfluenza Virus 1 Not Detected     Parainfluenza Virus 2 Not Detected     Parainfluenza Virus 3 Not Detected     Parainfluenza Virus 4 Not Detected     Respiratory Syncytial Virus Not Detected     " Bordetella Parapertussis ([id number]) Not Detected     Bordetella pertussis (ptxP) Not Detected     Chlamydia pneumoniae Not Detected     Mycoplasma pneumoniae Not Detected    Urine Culture High Risk [[id number]] Collected: 25    Order Status: Completed Specimen: Urine Updated: 25     Urine Culture No Growth              ASSESSMENT/PLAN:     Active Hospital Problems    Diagnosis  POA    *Acute chest syndrome [D57.01]  Yes    High-risk pregnancy in second trimester [O09.92]  Not Applicable    Iron overload [E83.19]  Yes    Sickle cell pain crisis [D57.00]  Yes    Sickle cell disease [D57.1]  Yes      Resolved Hospital Problems   No resolved problems to display.     Assessment:   Ms. Boni Duarte is a 34 y.o. Q1H1330H at 22w0d who presents as a transfer from OSH for management of possible acute chest syndrome in the setting of known sickle cell disease.     Acute Chest Syndrome  - VSS, afebrile  - PE findings as above, 1.5L NC in place. Weaning off the oxygen as tolerated.   - Work-up from OSH: Negative troponin. Leukocytosis of 14.5 noted. H/H 8.8/ (baseline 7.5/20). Ferritin 447. Reticulocyte count 14.8. CXR revealed heterogenous opacities which may reflect atelectasis or pneumonia. Blood cultures no growth after 36 hrs     Plan:              - Resp panel negative   - Urine culture negative/no growth  - Continue ceftriaxone/azithromycin              - mIVF LR @ 125cc/hr               - 1u pRBC held              - Maintain O2 sat > 95%              - Diet: Regular              - Monitoring: FHT qD   - Incentive spirometry use with improvement in O2 requirements   - Pulmonology consult performed, will give last dose ceftriaxone today and then DC if walk test passed     Sickle Cell Disease  - Hematologist: Dr. Holguin  - Baseline Hgb: 7-8  - Home regimen: Tylenol PRN  - Tylenol PRN, Oxy 5/10 PRN ordered    Sarah Spangler MD  OB/GYN PGY-3

## 2025-04-08 LAB
ABO + RH BLD: NORMAL
BLD PROD TYP BPU: NORMAL
BLOOD UNIT EXPIRATION DATE: NORMAL
BLOOD UNIT TYPE CODE: 1700
CROSSMATCH INTERPRETATION: NORMAL
DISPENSE STATUS: NORMAL
UNIT NUMBER: NORMAL

## 2025-04-09 ENCOUNTER — PROCEDURE VISIT (OUTPATIENT)
Dept: MATERNAL FETAL MEDICINE | Facility: CLINIC | Age: 35
End: 2025-04-09
Payer: MEDICAID

## 2025-04-09 DIAGNOSIS — Z36.89 ENCOUNTER FOR ULTRASOUND TO ASSESS FETAL GROWTH: ICD-10-CM

## 2025-04-09 DIAGNOSIS — Z36.2 ENCOUNTER FOR FOLLOW-UP ULTRASOUND OF FETAL ANATOMY: ICD-10-CM

## 2025-04-09 DIAGNOSIS — O09.92 HIGH-RISK PREGNANCY IN SECOND TRIMESTER: Primary | ICD-10-CM

## 2025-04-09 DIAGNOSIS — D57.00 SICKLE CELL DISEASE WITH CRISIS: ICD-10-CM

## 2025-04-09 DIAGNOSIS — Z36.89 ENCOUNTER FOR ULTRASOUND TO ASSESS FETAL GROWTH: Primary | ICD-10-CM

## 2025-04-09 DIAGNOSIS — O99.012 ANEMIA DURING PREGNANCY IN SECOND TRIMESTER: ICD-10-CM

## 2025-04-09 LAB
BACTERIA BLD CULT: NORMAL
BACTERIA BLD CULT: NORMAL

## 2025-04-09 PROCEDURE — 76816 OB US FOLLOW-UP PER FETUS: CPT | Mod: PBBFAC | Performed by: OBSTETRICS & GYNECOLOGY

## 2025-04-09 NOTE — PROGRESS NOTES
The patient location is: [facility]  The chief complaint leading to consultation is: SIckle disease complicating pregnacy    Visit type: audiovisual    Face to Face time with patient: 5 min  20 minutes of total time spent on the encounter, which includes face to face time and non-face to face time preparing to see the patient (eg, review of tests), Obtaining and/or reviewing separately obtained history, Documenting clinical information in the electronic or other health record, Independently interpreting results (not separately reported) and communicating results to the patient/family/caregiver, or Care coordination (not separately reported).         Each patient to whom he or she provides medical services by telemedicine is:  (1) informed of the relationship between the physician and patient and the respective role of any other health care provider with respect to management of the patient; and (2) notified that he or she may decline to receive medical services by telemedicine and may withdraw from such care at any time.    Notes:       Maternal Fetal Medicine follow up consult    SUBJECTIVE:     Boni Duarte is a 34 y.o.  female with IUP at 22w3d who is seen in follow up consultation by [facility].  Pregnancy complications include:   agnosis   POA     *Acute chest syndrome [D57.01]   Yes    High-risk pregnancy in second trimester [O09.92]   Not Applicable    Iron overload [E83.19]   Yes    Sickle cell pain crisis [D57.00]   Yes    Sickle cell disease [D57.1]   Yes             No problems updated.    Previous notes reviewed.   No changes to medical, surgical, family, social, or obstetric history.    Interval history since last M visit: Patient  states she is now pain free and no SOB feekling nmuch better.Medications reviewed.    Care team members:  Dr. Pathak - Primary OB       OBJECTIVE:   LMP 2024 (Approximate)         Ultrasound performed. See viewpoint for full ultrasound report.  The fetal  targeted anatomic survey was completed today, and no fetal structural abnormalities were identified of the structures imaged today.   Fetal size is AGA with the EFW at the 26% and the AC at the 46%. The EFW is 469 g.  AFV is normal.     Significant labs/imaging:  O new labs.  See prior [facility] note for hospital labs.    ASSESSMENT/PLAN:     34 y.o.  female with IUP at 22w3d  Patient denied new problems, feeling well.  The patient was given an opportunity to ask questions about management and the diease process.  She expressed an understanding of and agreement to the above impression and plan. All questions were answered to her satisfaction.  She was given contact information to the [facility] clinic to address further concerns.

## 2025-04-13 ENCOUNTER — RESULTS FOLLOW-UP (OUTPATIENT)
Dept: OBSTETRICS AND GYNECOLOGY | Facility: CLINIC | Age: 35
End: 2025-04-13
Payer: MEDICAID

## 2025-04-20 ENCOUNTER — PATIENT MESSAGE (OUTPATIENT)
Dept: OTHER | Facility: OTHER | Age: 35
End: 2025-04-20
Payer: MEDICAID

## 2025-04-23 ENCOUNTER — ROUTINE PRENATAL (OUTPATIENT)
Dept: OBSTETRICS AND GYNECOLOGY | Facility: CLINIC | Age: 35
End: 2025-04-23
Payer: MEDICAID

## 2025-04-23 VITALS
BODY MASS INDEX: 28.29 KG/M2 | WEIGHT: 180.63 LBS | SYSTOLIC BLOOD PRESSURE: 102 MMHG | DIASTOLIC BLOOD PRESSURE: 68 MMHG | HEART RATE: 89 BPM

## 2025-04-23 DIAGNOSIS — D57.01 ACUTE CHEST SYNDROME: ICD-10-CM

## 2025-04-23 DIAGNOSIS — O09.92 HIGH-RISK PREGNANCY IN SECOND TRIMESTER: Primary | ICD-10-CM

## 2025-04-23 DIAGNOSIS — Z3A.24 24 WEEKS GESTATION OF PREGNANCY: ICD-10-CM

## 2025-04-23 DIAGNOSIS — D57.00 SICKLE CELL DISEASE WITH CRISIS: ICD-10-CM

## 2025-04-23 DIAGNOSIS — O99.012 ANEMIA DURING PREGNANCY IN SECOND TRIMESTER: ICD-10-CM

## 2025-04-23 PROCEDURE — 99212 OFFICE O/P EST SF 10 MIN: CPT | Mod: PBBFAC,TH

## 2025-04-23 PROCEDURE — 99213 OFFICE O/P EST LOW 20 MIN: CPT | Mod: TH,S$PBB,,

## 2025-04-23 PROCEDURE — 1111F DSCHRG MED/CURRENT MED MERGE: CPT | Mod: CPTII,,,

## 2025-04-23 PROCEDURE — 99999 PR PBB SHADOW E&M-EST. PATIENT-LVL II: CPT | Mod: PBBFAC,,,

## 2025-04-23 NOTE — PROGRESS NOTES
MD pt; high risk sick cell disease with crisis during this pregnancy. Patient with no complaints other than exhaution. Works nights. Denies vaginal bleeding or contractions. Good FM. GTT/CBC/RPR and indirect linda (if applicable) ordered today.  Directed pt to adhere to pre ordered ferritin labs to asseess ongoing anemia.  labor precautions discussed with patient. Given info for prenatal classes, mat belt, breast pump through [employer].  RTC in 2 weeks for visit with MD.     Reviewed Open Evidence on recommendations for managing anemia and routine iron transfusions not recommended, states blood transfusions recommended with hgb levels below 7.     Vitals signs, FHTs, urine dip, and PE findings documented, reviewed and available in OB flow chart.       I spent a total of 20 minutes on the day of the visit.This includes face to face time and non-face to face time preparing to see the patient (eg, review of tests), Obtaining and/or reviewing separately obtained history, Documenting clinical information in the electronic or other health record, Independently interpreting resultsand communicating results to the patient/family/caregiver, or Care coordination.     Coffective counseling sheet Nourish discussed with mother. Reinforced basic breastfeeding position and latch as well as proper hand expression technique and avoidance of artificial nipples and formula unless medically indicated. Encouraged mother to download Coffective mobile aly if she has not already done so.  Mother verbalizes understanding.

## 2025-05-04 ENCOUNTER — PATIENT MESSAGE (OUTPATIENT)
Dept: OTHER | Facility: OTHER | Age: 35
End: 2025-05-04
Payer: MEDICAID

## 2025-05-13 NOTE — PROGRESS NOTES
"Maternal Fetal Medicine follow up consult    SUBJECTIVE:     Boni Duarte is a 34 y.o.  female with IUP at 27w4d who is seen in follow up consultation by MFM.  Pregnancy complications include:   Problem   - Maternal sickle cell anemia affecting pregnancy, antepartum     Boni presents for routine follow up appointment.  Denies LOF, VB, contractions. Positive fetal movement.  She reports fatigue but no other issues. Last crisis was ACS on  hospitalized at [facility].  She is feeling well and has no complaints.  She is taking folic acid and prenatal vitamin.  She did not start baby aspirin.  She is not dependent on narcotics.     Previous notes reviewed.   No changes to medical, surgical, family, social, or obstetric history.  Interval history since last MFM visit: see above  Medications reviewed.    Care team members:  Dr Pathak - Primary OB     OBJECTIVE:   /63 (BP Location: Right arm, Patient Position: Sitting)   Pulse 86   Ht 5' 7" (1.702 m)   Wt 84.8 kg (187 lb 1 oz)   LMP 2024 (Approximate)   BMI 29.30 kg/m²     Ultrasound performed. See viewpoint for full ultrasound report.    A viable carpenter pregnancy is visualized in cephalic presentation. Estimated fetal weight is at the 49th percentile with an abdominal circumference at the 62nd percentile. No fetal abnormalities are noted and previous anatomic survey was normal. Amniotic fluid volume is normal. Placenta is anterior.   ASSESSMENT/PLAN:     34 y.o.  female with IUP at 27w4d    - Maternal sickle cell anemia affecting pregnancy, antepartum  Last crisis >1 year ago  FOB not a carrier per patient but   G1 pregnancy in  uncomplicated, no crises, daughter doing well     The patient was counseled about sickle cell anemia in pregnancy. The patient was counseled regarding maternal risks, which include but are not limited to: sickle cell crisis and acute chest syndrome, antepartum hospitalization, venous " thromboembolic disease, stroke, preeclampsia/eclampsia,  delivery, infectious morbidity,  delivery, and death. The patient was counseled regarding fetal risks which include but are not limited to: miscarriage, fetal growth restriction, stillbirth, low birthweight, and prematurity. The patient was counseled regarding the inheritance risk. Sickle cell disease is inherited in an autosomal recessive fashion. The carrier rate in the  population is 1:12.     Recommendations:  Laboratory evaluation:  For partner: Hemoglobin electrophoresis and CBC (and additional testing for alpha or beta thalassemia as indicated) ( screening to be performed after birth)  Prenatal diagnosis is available if desired/indicated  Baseline labs: CBC and CMP reviewed. Needs P/C ratio or 24 hour urine protein  Urine culture q trimester  CBC monthly  Medications:  Folic acid 4 mg  Aspirin 81 mg, starting at 12 weeks gestation- Rx sent.   Avoid hydroxyurea in pregnancy. She is not on any medications currently.   Limited data on Jadenu in pregnancy. If it is required for iron chelation per Hematology, please re-engage MFM so we can discuss r/b/a  Narcotics per heme/onc-avoid abrupt cessation and avoid NSAIDs, particularly after 32 weeks  Avoid iron supplementation due to risks of iron overload. Previously instructed to hold her PNV and iron supplement. Can try PNV without iron. Encouraged discussion with Dr. Holguin.   Prophylactic lovenox or heparin while admitted to the hospital antepartum or postpartum  Vaccinations (per primary physician):  Hep B, pneumovax, flu vaccination annually, and COVID vaccination as appropriate  Baseline maternal evaluation (ordered by primary OB):  Maternal echo - needs (last in )  EKG - reviewed from   Pulmonary function studies, if not performed previously  Ophthalmic exam - needs, reports last >3 years ago  Ultrasounds (with MFM):  Detailed anatomic survey at 19-20 weeks  - completed 3/26  Serial growth ultrasounds starting at 26-28 weeks, to be performed every 4-6 weeks -  Antepartum testing twice weekly at 32 weeks (to be facilitated by primary OB)  Delivery timin weeks, unless indicated earlier    F/u in 4 weeks for MFM visit /growth ultrasound    Meme Morales MD  Maternal Fetal Medicine

## 2025-05-13 NOTE — ASSESSMENT & PLAN NOTE
Last crisis >1 year ago  FOB not a carrier per patient but   G1 pregnancy in  uncomplicated, no crises, daughter doing well     The patient was counseled about sickle cell anemia in pregnancy. The patient was counseled regarding maternal risks, which include but are not limited to: sickle cell crisis and acute chest syndrome, antepartum hospitalization, venous thromboembolic disease, stroke, preeclampsia/eclampsia,  delivery, infectious morbidity,  delivery, and death. The patient was counseled regarding fetal risks which include but are not limited to: miscarriage, fetal growth restriction, stillbirth, low birthweight, and prematurity. The patient was counseled regarding the inheritance risk. Sickle cell disease is inherited in an autosomal recessive fashion. The carrier rate in the  population is 1:12.     Recommendations:  Laboratory evaluation:  For partner: Hemoglobin electrophoresis and CBC (and additional testing for alpha or beta thalassemia as indicated) (Miles screening to be performed after birth)  Prenatal diagnosis is available if desired/indicated  Baseline labs: CBC and CMP reviewed. Needs P/C ratio or 24 hour urine protein  Urine culture q trimester  CBC monthly  Medications:  Folic acid 4 mg  Aspirin 81 mg, starting at 12 weeks gestation- Rx sent.   Avoid hydroxyurea in pregnancy. She is not on any medications currently.   Limited data on Jadenu in pregnancy. If it is required for iron chelation per Hematology, please re-engage MFM so we can discuss r/b/a  Narcotics per heme/onc-avoid abrupt cessation and avoid NSAIDs, particularly after 32 weeks  Avoid iron supplementation due to risks of iron overload. Previously instructed to hold her PNV and iron supplement. Can try PNV without iron. Encouraged discussion with Dr. Holguin.   Prophylactic lovenox or heparin while admitted to the hospital antepartum or postpartum  Vaccinations (per primary physician):  Hep B,  pneumovax, flu vaccination annually, and COVID vaccination as appropriate  Baseline maternal evaluation (ordered by primary OB):  Maternal echo - needs (last in )  EKG - reviewed from   Pulmonary function studies, if not performed previously  Ophthalmic exam - needs, reports last >3 years ago  Ultrasounds (with MFM):  Detailed anatomic survey at 19-20 weeks - completed 3/26  Serial growth ultrasounds starting at 26-28 weeks, to be performed every 4-6 weeks -  Antepartum testing twice weekly at 32 weeks (to be facilitated by primary OB)  Delivery timin weeks, unless indicated earlier

## 2025-05-15 ENCOUNTER — PROCEDURE VISIT (OUTPATIENT)
Dept: MATERNAL FETAL MEDICINE | Facility: CLINIC | Age: 35
End: 2025-05-15
Payer: MEDICAID

## 2025-05-15 ENCOUNTER — OFFICE VISIT (OUTPATIENT)
Dept: MATERNAL FETAL MEDICINE | Facility: CLINIC | Age: 35
End: 2025-05-15
Payer: MEDICAID

## 2025-05-15 ENCOUNTER — RESULTS FOLLOW-UP (OUTPATIENT)
Dept: OBSTETRICS AND GYNECOLOGY | Facility: HOSPITAL | Age: 35
End: 2025-05-15

## 2025-05-15 VITALS
WEIGHT: 187.06 LBS | HEART RATE: 86 BPM | BODY MASS INDEX: 29.36 KG/M2 | DIASTOLIC BLOOD PRESSURE: 63 MMHG | SYSTOLIC BLOOD PRESSURE: 100 MMHG | HEIGHT: 67 IN

## 2025-05-15 DIAGNOSIS — Z36.89 ENCOUNTER FOR ULTRASOUND TO ASSESS FETAL GROWTH: ICD-10-CM

## 2025-05-15 DIAGNOSIS — D57.1 MATERNAL SICKLE CELL ANEMIA AFFECTING PREGNANCY, ANTEPARTUM: Primary | ICD-10-CM

## 2025-05-15 DIAGNOSIS — O99.019 MATERNAL SICKLE CELL ANEMIA AFFECTING PREGNANCY, ANTEPARTUM: Primary | ICD-10-CM

## 2025-05-15 DIAGNOSIS — Z36.2 ENCOUNTER FOR FOLLOW-UP ULTRASOUND OF FETAL ANATOMY: Primary | ICD-10-CM

## 2025-05-18 ENCOUNTER — PATIENT MESSAGE (OUTPATIENT)
Dept: OTHER | Facility: OTHER | Age: 35
End: 2025-05-18
Payer: MEDICAID

## 2025-05-19 ENCOUNTER — RESULTS FOLLOW-UP (OUTPATIENT)
Dept: OBSTETRICS AND GYNECOLOGY | Facility: CLINIC | Age: 35
End: 2025-05-19

## 2025-05-19 ENCOUNTER — LAB VISIT (OUTPATIENT)
Dept: LAB | Facility: HOSPITAL | Age: 35
End: 2025-05-19
Payer: MEDICAID

## 2025-05-19 DIAGNOSIS — O09.92 HIGH-RISK PREGNANCY IN SECOND TRIMESTER: ICD-10-CM

## 2025-05-19 LAB
ABSOLUTE EOSINOPHIL (OHS): 0.45 K/UL
ABSOLUTE MONOCYTE (OHS): 1.42 K/UL (ref 0.3–1)
ABSOLUTE NEUTROPHIL COUNT (OHS): 9.17 K/UL (ref 1.8–7.7)
BASOPHILS # BLD AUTO: 0.04 K/UL
BASOPHILS NFR BLD AUTO: 0.3 %
ERYTHROCYTE [DISTWIDTH] IN BLOOD BY AUTOMATED COUNT: 19.8 % (ref 11.5–14.5)
GLUCOSE SERPL-MCNC: 94 MG/DL (ref 70–140)
HCT VFR BLD AUTO: 22.6 % (ref 37–48.5)
HGB BLD-MCNC: 8.1 GM/DL (ref 12–16)
HIV 1+2 AB+HIV1 P24 AG SERPL QL IA: NORMAL
IMM GRANULOCYTES # BLD AUTO: 0.14 K/UL (ref 0–0.04)
IMM GRANULOCYTES NFR BLD AUTO: 1 % (ref 0–0.5)
LYMPHOCYTES # BLD AUTO: 2.32 K/UL (ref 1–4.8)
MCH RBC QN AUTO: 31.3 PG (ref 27–31)
MCHC RBC AUTO-ENTMCNC: 35.8 G/DL (ref 32–36)
MCV RBC AUTO: 87 FL (ref 82–98)
NUCLEATED RBC (/100WBC) (OHS): 5 /100 WBC
PLATELET # BLD AUTO: 381 K/UL (ref 150–450)
PMV BLD AUTO: 10.3 FL (ref 9.2–12.9)
RBC # BLD AUTO: 2.59 M/UL (ref 4–5.4)
RELATIVE EOSINOPHIL (OHS): 3.3 %
RELATIVE LYMPHOCYTE (OHS): 17.1 % (ref 18–48)
RELATIVE MONOCYTE (OHS): 10.5 % (ref 4–15)
RELATIVE NEUTROPHIL (OHS): 67.8 % (ref 38–73)
WBC # BLD AUTO: 13.54 K/UL (ref 3.9–12.7)

## 2025-05-19 PROCEDURE — 86593 SYPHILIS TEST NON-TREP QUANT: CPT

## 2025-05-19 PROCEDURE — 87389 HIV-1 AG W/HIV-1&-2 AB AG IA: CPT

## 2025-05-19 PROCEDURE — 82950 GLUCOSE TEST: CPT

## 2025-05-19 PROCEDURE — 86780 TREPONEMA PALLIDUM: CPT

## 2025-05-19 PROCEDURE — 85025 COMPLETE CBC W/AUTO DIFF WBC: CPT

## 2025-05-19 PROCEDURE — 36415 COLL VENOUS BLD VENIPUNCTURE: CPT

## 2025-05-20 LAB — T PALLIDUM IGG+IGM SER QL: NORMAL

## 2025-05-28 ENCOUNTER — ROUTINE PRENATAL (OUTPATIENT)
Dept: OBSTETRICS AND GYNECOLOGY | Facility: CLINIC | Age: 35
End: 2025-05-28
Payer: MEDICAID

## 2025-05-28 VITALS
SYSTOLIC BLOOD PRESSURE: 118 MMHG | BODY MASS INDEX: 29.49 KG/M2 | DIASTOLIC BLOOD PRESSURE: 76 MMHG | HEART RATE: 86 BPM | WEIGHT: 188.31 LBS

## 2025-05-28 DIAGNOSIS — O09.93 HIGH-RISK PREGNANCY IN THIRD TRIMESTER: Primary | ICD-10-CM

## 2025-05-28 DIAGNOSIS — Z23 NEED FOR TDAP VACCINATION: ICD-10-CM

## 2025-05-28 DIAGNOSIS — Z3A.29 29 WEEKS GESTATION OF PREGNANCY: ICD-10-CM

## 2025-05-28 DIAGNOSIS — D57.1 MATERNAL SICKLE CELL ANEMIA AFFECTING PREGNANCY, ANTEPARTUM: ICD-10-CM

## 2025-05-28 DIAGNOSIS — O99.019 MATERNAL SICKLE CELL ANEMIA AFFECTING PREGNANCY, ANTEPARTUM: ICD-10-CM

## 2025-05-28 PROCEDURE — 90471 IMMUNIZATION ADMIN: CPT | Mod: PBBFAC

## 2025-05-28 PROCEDURE — 99999PBSHW PR PBB SHADOW TECHNICAL ONLY FILED TO HB: Mod: PBBFAC,,,

## 2025-05-28 PROCEDURE — 99213 OFFICE O/P EST LOW 20 MIN: CPT | Mod: TH,S$PBB,, | Performed by: OBSTETRICS & GYNECOLOGY

## 2025-05-28 PROCEDURE — 99999 PR PBB SHADOW E&M-EST. PATIENT-LVL III: CPT | Mod: PBBFAC,,, | Performed by: OBSTETRICS & GYNECOLOGY

## 2025-05-28 PROCEDURE — 90715 TDAP VACCINE 7 YRS/> IM: CPT | Mod: PBBFAC

## 2025-05-28 PROCEDURE — 99213 OFFICE O/P EST LOW 20 MIN: CPT | Mod: PBBFAC,TH | Performed by: OBSTETRICS & GYNECOLOGY

## 2025-05-28 RX ADMIN — CLOSTRIDIUM TETANI TOXOID ANTIGEN (FORMALDEHYDE INACTIVATED), CORYNEBACTERIUM DIPHTHERIAE TOXOID ANTIGEN (FORMALDEHYDE INACTIVATED), BORDETELLA PERTUSSIS TOXOID ANTIGEN (GLUTARALDEHYDE INACTIVATED), BORDETELLA PERTUSSIS FILAMENTOUS HEMAGGLUTININ ANTIGEN (FORMALDEHYDE INACTIVATED), BORDETELLA PERTUSSIS PERTACTIN ANTIGEN, AND BORDETELLA PERTUSSIS FIMBRIAE 2/3 ANTIGEN 0.5 ML: 5; 2; 2.5; 5; 3; 5 INJECTION, SUSPENSION INTRAMUSCULAR at 01:05

## 2025-05-28 NOTE — PROGRESS NOTES
Patient with no complaints. Denies vaginal bleeding or contractions. Good FM. Growth scan ordered for next visit.     Vitals signs, FHTs, urine dip, and PE findings documented, reviewed and available in OB flow chart.           I spent a total of 20 minutes on the day of the visit.This includes face to face time and non-face to face time preparing to see the patient (eg, review of tests), Obtaining and/or reviewing separately obtained history, Documenting clinical information in the electronic or other health record, Independently interpreting resultsand communicating results to the patient/family/caregiver, or Care coordination.     Coffective Motivation Document discussed with mother. Reinforced benefits of breastfeeding, risk of formula, and cue based feedings. Encouraged mother to download [employer] mobile aly if she has not already done so. Mother verbalizes understanding.

## 2025-06-01 ENCOUNTER — PATIENT MESSAGE (OUTPATIENT)
Dept: OTHER | Facility: OTHER | Age: 35
End: 2025-06-01
Payer: MEDICAID

## 2025-06-09 ENCOUNTER — PATIENT MESSAGE (OUTPATIENT)
Dept: OBSTETRICS AND GYNECOLOGY | Facility: CLINIC | Age: 35
End: 2025-06-09
Payer: MEDICAID

## 2025-06-11 ENCOUNTER — PROCEDURE VISIT (OUTPATIENT)
Dept: OBSTETRICS AND GYNECOLOGY | Facility: CLINIC | Age: 35
End: 2025-06-11
Payer: MEDICAID

## 2025-06-11 ENCOUNTER — ROUTINE PRENATAL (OUTPATIENT)
Dept: OBSTETRICS AND GYNECOLOGY | Facility: CLINIC | Age: 35
End: 2025-06-11
Payer: MEDICAID

## 2025-06-11 VITALS
SYSTOLIC BLOOD PRESSURE: 116 MMHG | HEART RATE: 88 BPM | WEIGHT: 190.63 LBS | BODY MASS INDEX: 29.85 KG/M2 | DIASTOLIC BLOOD PRESSURE: 80 MMHG

## 2025-06-11 DIAGNOSIS — O09.93 HIGH-RISK PREGNANCY IN THIRD TRIMESTER: ICD-10-CM

## 2025-06-11 DIAGNOSIS — O99.019 MATERNAL SICKLE CELL ANEMIA AFFECTING PREGNANCY, ANTEPARTUM: ICD-10-CM

## 2025-06-11 DIAGNOSIS — Z3A.31 31 WEEKS GESTATION OF PREGNANCY: Primary | ICD-10-CM

## 2025-06-11 DIAGNOSIS — D57.1 MATERNAL SICKLE CELL ANEMIA AFFECTING PREGNANCY, ANTEPARTUM: ICD-10-CM

## 2025-06-11 PROCEDURE — 99999 PR PBB SHADOW E&M-EST. PATIENT-LVL II: CPT | Mod: PBBFAC,,, | Performed by: OBSTETRICS & GYNECOLOGY

## 2025-06-11 PROCEDURE — 76816 OB US FOLLOW-UP PER FETUS: CPT | Mod: PBBFAC | Performed by: STUDENT IN AN ORGANIZED HEALTH CARE EDUCATION/TRAINING PROGRAM

## 2025-06-11 PROCEDURE — 99212 OFFICE O/P EST SF 10 MIN: CPT | Mod: PBBFAC,TH | Performed by: OBSTETRICS & GYNECOLOGY

## 2025-06-11 NOTE — PROGRESS NOTES
Patient with no complaints. Denies vaginal bleeding or contractions. Good FM. Growth scan today. Consents  Patient desires permanent sterilization. RTC in 2 weeks.     Vitals signs, FHTs, urine dip, and PE findings documented, reviewed and available in OB flow chart.       I spent a total of 20 minutes on the day of the visit.This includes face to face time and non-face to face time preparing to see the patient (eg, review of tests), Obtaining and/or reviewing separately obtained history, Documenting clinical information in the electronic or other health record, Independently interpreting resultsand communicating results to the patient/family/caregiver, or Care coordination.     Coffective counseling sheet Build Your Team discussed with mother. Reinforced importance of early identification of support team including champion, OB provider, pediatrician and local community resources. Encouraged mother to download [employer] mobile aly if she has not already done so.  Mother verbalizes understanding. Also discussed quiet time and delayed bathing.

## 2025-06-12 ENCOUNTER — PROCEDURE VISIT (OUTPATIENT)
Dept: MATERNAL FETAL MEDICINE | Facility: CLINIC | Age: 35
End: 2025-06-12
Payer: MEDICAID

## 2025-06-12 ENCOUNTER — OFFICE VISIT (OUTPATIENT)
Dept: MATERNAL FETAL MEDICINE | Facility: CLINIC | Age: 35
End: 2025-06-12
Payer: MEDICAID

## 2025-06-12 VITALS
HEART RATE: 96 BPM | DIASTOLIC BLOOD PRESSURE: 63 MMHG | WEIGHT: 190.56 LBS | BODY MASS INDEX: 29.91 KG/M2 | SYSTOLIC BLOOD PRESSURE: 102 MMHG | HEIGHT: 67 IN

## 2025-06-12 DIAGNOSIS — O99.019 MATERNAL SICKLE CELL ANEMIA AFFECTING PREGNANCY, ANTEPARTUM: ICD-10-CM

## 2025-06-12 DIAGNOSIS — D57.1 MATERNAL SICKLE CELL ANEMIA AFFECTING PREGNANCY, ANTEPARTUM: Primary | ICD-10-CM

## 2025-06-12 DIAGNOSIS — O99.019 MATERNAL SICKLE CELL ANEMIA AFFECTING PREGNANCY, ANTEPARTUM: Primary | ICD-10-CM

## 2025-06-12 DIAGNOSIS — D57.1 MATERNAL SICKLE CELL ANEMIA AFFECTING PREGNANCY, ANTEPARTUM: ICD-10-CM

## 2025-06-12 NOTE — ASSESSMENT & PLAN NOTE
Last crisis this pregnancy in April  FOB not a carrier per patient but no records  G1 pregnancy in  uncomplicated, no crises, daughter doing well     The patient was counseled about sickle cell anemia in pregnancy. The patient was counseled regarding maternal risks, which include but are not limited to: sickle cell crisis and acute chest syndrome, antepartum hospitalization, venous thromboembolic disease, stroke, preeclampsia/eclampsia,  delivery, infectious morbidity,  delivery, and death. The patient was counseled regarding fetal risks which include but are not limited to: miscarriage, fetal growth restriction, stillbirth, low birthweight, and prematurity. The patient was counseled regarding the inheritance risk. Sickle cell disease is inherited in an autosomal recessive fashion. The carrier rate in the  population is 1:12.     Recommendations:  Laboratory evaluation:  For partner: Hemoglobin electrophoresis and CBC (and additional testing for alpha or beta thalassemia as indicated) ( screening to be performed after birth)  Prenatal diagnosis is available if desired/indicated  Baseline labs: CBC and CMP reviewed. Needs P/C ratio or 24 hour urine protein  Urine culture q trimester  CBC monthly  Medications:  Folic acid 4 mg  Aspirin 81 mg, starting at 12 weeks gestation- Rx sent but she is not taking  Avoid hydroxyurea in pregnancy. She is not on any medications currently.   Limited data on Jadenu in pregnancy. If it is required for iron chelation per Hematology, please re-engage MFM so we can discuss r/b/a  Narcotics per heme/onc-avoid abrupt cessation and avoid NSAIDs, particularly after 32 weeks  Avoid iron supplementation due to risks of iron overload. Previously instructed to hold her PNV and iron supplement. Can try PNV without iron. Encouraged discussion with Dr. Holguin.   Prophylactic lovenox or heparin while admitted to the hospital antepartum or  postpartum  Vaccinations (per primary physician):  Hep B, pneumovax, flu vaccination annually, and COVID vaccination as appropriate  Baseline maternal evaluation (ordered by primary OB):  Maternal echo - needs (last in )  EKG - reviewed from   Pulmonary function studies, if not performed previously  Ophthalmic exam - needs, reports last >3 years ago  Ultrasounds (with MFM):  Detailed anatomic survey at 19-20 weeks - completed 3/26  Serial growth ultrasounds starting at 26-28 weeks, to be performed every 4-6 weeks -  Antepartum testing twice weekly at 32 weeks (to be facilitated by primary OB)  Delivery timin weeks, unless indicated earlier

## 2025-06-12 NOTE — PROGRESS NOTES
"Maternal Fetal Medicine follow up consult    SUBJECTIVE:     Boni Duarte is a 34 y.o.  female with IUP at 31w4d who is seen in follow up consultation by MFM.  Pregnancy complications include:   Problem   - Maternal sickle cell anemia affecting pregnancy, antepartum       Boni presents for routine follow up appointment.  Denies LOF, VB, contractions. Positive fetal movement.  Last crisis was ACS on  hospitalized at [facility]. She is taking folic acid and prenatal vitamin.    She is feeling well and has no current complaints.     Previous notes reviewed.   No changes to medical, surgical, family, social, or obstetric history.  Interval history since last MFM visit: see above  Medications reviewed.    Care team members:  Dr Pathak - Primary OB     OBJECTIVE:   /63 (BP Location: Right arm, Patient Position: Sitting)   Pulse 96   Ht 5' 7" (1.702 m)   Wt 86.4 kg (190 lb 9.4 oz)   LMP 2024 (Approximate)   BMI 29.85 kg/m²     Ultrasound performed. See viewpoint for full ultrasound report.    A viable carpenter pregnancy is visualized in cephalic presentation. Estimated fetal weight is at the 40th percentile with an abdominal circumference at the 94th percentile. No fetal abnormalities are noted and previous anatomic survey was normal. Amniotic fluid volume is normal. Placenta is anterior.   ASSESSMENT/PLAN:     34 y.o.  female with IUP at 31w4d    - Maternal sickle cell anemia affecting pregnancy, antepartum  Last crisis this pregnancy in April  FOB not a carrier per patient but no records  G1 pregnancy in  uncomplicated, no crises, daughter doing well     The patient was counseled about sickle cell anemia in pregnancy. The patient was counseled regarding maternal risks, which include but are not limited to: sickle cell crisis and acute chest syndrome, antepartum hospitalization, venous thromboembolic disease, stroke, preeclampsia/eclampsia,  delivery, infectious " morbidity,  delivery, and death. The patient was counseled regarding fetal risks which include but are not limited to: miscarriage, fetal growth restriction, stillbirth, low birthweight, and prematurity. The patient was counseled regarding the inheritance risk. Sickle cell disease is inherited in an autosomal recessive fashion. The carrier rate in the  population is 1:12.     Recommendations:  Laboratory evaluation:  For partner: Hemoglobin electrophoresis and CBC (and additional testing for alpha or beta thalassemia as indicated) ( screening to be performed after birth)  Prenatal diagnosis is available if desired/indicated  Baseline labs: CBC and CMP reviewed. Needs P/C ratio or 24 hour urine protein  Urine culture q trimester  CBC monthly  Medications:  Folic acid 4 mg  Aspirin 81 mg, starting at 12 weeks gestation- Rx sent but she is not taking  Avoid hydroxyurea in pregnancy. She is not on any medications currently.   Limited data on Jadenu in pregnancy. If it is required for iron chelation per Hematology, please re-engage MFM so we can discuss r/b/a  Narcotics per heme/onc-avoid abrupt cessation and avoid NSAIDs, particularly after 32 weeks  Avoid iron supplementation due to risks of iron overload. Previously instructed to hold her PNV and iron supplement. Can try PNV without iron. Encouraged discussion with Dr. oHlguin.   Prophylactic lovenox or heparin while admitted to the hospital antepartum or postpartum  Vaccinations (per primary physician):  Hep B, pneumovax, flu vaccination annually, and COVID vaccination as appropriate  Baseline maternal evaluation (ordered by primary OB):  Maternal echo - needs (last in )  EKG - reviewed from   Pulmonary function studies, if not performed previously  Ophthalmic exam - needs, reports last >3 years ago  Ultrasounds (with M):  Detailed anatomic survey at 19-20 weeks - completed 3/26  Serial growth ultrasounds starting at 26-28  weeks, to be performed every 4-6 weeks -  Antepartum testing twice weekly at 32 weeks (to be facilitated by primary OB)  Delivery timin weeks, unless indicated earlier      F/u in 4 weeks for MFM visit /growth ultrasound    Meme Morales MD  Maternal Fetal Medicine

## 2025-06-15 ENCOUNTER — PATIENT MESSAGE (OUTPATIENT)
Dept: OTHER | Facility: OTHER | Age: 35
End: 2025-06-15
Payer: MEDICAID

## 2025-06-21 ENCOUNTER — HOSPITAL ENCOUNTER (OUTPATIENT)
Facility: HOSPITAL | Age: 35
Discharge: HOME | End: 2025-06-21
Attending: OBSTETRICS & GYNECOLOGY | Admitting: OBSTETRICS & GYNECOLOGY
Payer: MEDICAID

## 2025-06-21 VITALS
BODY MASS INDEX: 29.9 KG/M2 | HEART RATE: 85 BPM | SYSTOLIC BLOOD PRESSURE: 101 MMHG | TEMPERATURE: 98 F | WEIGHT: 190.5 LBS | RESPIRATION RATE: 18 BRPM | DIASTOLIC BLOOD PRESSURE: 56 MMHG | OXYGEN SATURATION: 95 % | HEIGHT: 67 IN

## 2025-06-21 DIAGNOSIS — O46.93 VAGINAL BLEEDING IN PREGNANCY, THIRD TRIMESTER: ICD-10-CM

## 2025-06-21 DIAGNOSIS — Z34.90 PREGNANCY: ICD-10-CM

## 2025-06-21 PROCEDURE — 99211 OFF/OP EST MAY X REQ PHY/QHP: CPT

## 2025-06-21 PROCEDURE — G0378 HOSPITAL OBSERVATION PER HR: HCPCS

## 2025-06-21 PROCEDURE — 59025 FETAL NON-STRESS TEST: CPT

## 2025-06-21 PROCEDURE — 59025 FETAL NON-STRESS TEST: CPT | Mod: 26,,, | Performed by: OBSTETRICS & GYNECOLOGY

## 2025-06-21 RX ORDER — ACETAMINOPHEN 500 MG
500 TABLET ORAL EVERY 6 HOURS PRN
Status: DISCONTINUED | OUTPATIENT
Start: 2025-06-21 | End: 2025-06-21 | Stop reason: HOSPADM

## 2025-06-21 RX ORDER — ONDANSETRON 8 MG/1
8 TABLET, ORALLY DISINTEGRATING ORAL EVERY 8 HOURS PRN
Status: DISCONTINUED | OUTPATIENT
Start: 2025-06-21 | End: 2025-06-21 | Stop reason: HOSPADM

## 2025-06-21 RX ORDER — SODIUM CHLORIDE, SODIUM LACTATE, POTASSIUM CHLORIDE, CALCIUM CHLORIDE 600; 310; 30; 20 MG/100ML; MG/100ML; MG/100ML; MG/100ML
INJECTION, SOLUTION INTRAVENOUS CONTINUOUS
Status: DISCONTINUED | OUTPATIENT
Start: 2025-06-21 | End: 2025-06-21 | Stop reason: HOSPADM

## 2025-06-21 NOTE — NURSING
Patient independent with self care and hygiene. Reports ready to go home. VS remain WNL. All discharge instructions, strict precautions, and when to seek medical attention and follow up instructions/appointments given and reviewed with patient pt. Instructions to call her primary Md on Monday morning were given and accepted. Instructions to rest and hydrate were given and accepted. Pt voices all understanding, all questions answered to pt satisfaction. Educated to contact L&D/MBU unit 24/7 assistance available with any further questions after discharge or OB clinic during business hours. Both written and verbal discharge instructions given to patient including but not limited to urgent maternal warning signs.

## 2025-06-21 NOTE — DISCHARGE INSTRUCTIONS
Return to the labor unit or call ob nurse at hospital if:    1.  Any questions whether the water bag broke or is leaking (sudden gush or suspected leak).   2.  If 35 wks or greater, no need to call about passing the mucous plug if no other signs of labor.  3.  You may have spotting for a day or two from the vaginal exam  4.  Please report heavier vaginal bleeding (requires you to put a pad on or blood is running down your leg)  5. Report if you are having more than 6 contractions in an hour and less than 37 weeks, but at 37-38 wks. and beyond you can time your contractions and notify doctor if they are longer, stronger and closer together  6.  You should call if you are experiencing sharp abdominal pains or severe cramping.    7.  If you are > or = 28 wks, Do fetal kick counts 2 times a day  &  report decreased fetal movement:  You should feel 10 movements in 2 hours or less.  Notify MD or OB nurse as soon as possible if you are not getting the required movements or if  it is taking you longer and longer to get the 10 movements and DO NOT WAIT UNTIL TOMORROW EVEN IF YOU HAVE AN APPT.  8.  Drink plenty of water and empty your bladder often, avoid caffeine & carbonated beverages as much as possible & avoid smoking  9.  Keep your appt. as scheduled    Office phone # [phone]  If after office hours, on the weekend, or unable to reach the nurse at the office, call the Hospital phone # [phone] & ask to speak to the OB nurse

## 2025-06-21 NOTE — NURSING
OB TRIAGE ASSESSMENT    RE: Boni Duarte  MRN:  [id number]  :  1990  AGE:  34 y.o.    Date:  2025    PHYSICIAN: Delvin Chapin MD    Allergies: Patient has no known allergies.    Boni is a 34 y.o.  at 32w6d gestational age presents with complaint(s) of vaginal spotting, lower back pain.      Triage Course:    Patient was evaluated in triage on L&D.  NST was NST reactive. Patient was monitored for 2 hours.  Cervix was 0/0/+4.  Plan of care was discussed with MD on call.   Patient discharged home with return precautions and plans for routine follow up.      NST:    150 BPM baseline  Variability:  Good {> 6 bpm)  Accelerations:  Reactive  Decelerations:  none    Downs: contractions Q 0 minutes          Anabel Porter   2025; 11:41 AM

## 2025-06-21 NOTE — PROCEDURES
FETAL ASSESSMENT REPORT    RE: Boni Duarte  MRN:  [id number]  :  1990  AGE:  34 y.o.    Date:  2025    REFERRAL PHYSICIAN: Dr. Delvin Chapin    Allergies: Patient has no known allergies.    Boni is a 34 y.o.  at 32w6d gestational age here today for a NST.    8/10/2025, by Ultrasound    MEDICATIONS AT TIME OF TEST:  Current Medications[1]    Indication: vaginal bleeding    Interpretation:  150 BPM baseline    Variability:  Good {> 6 bpm)    Accelerations:  Reactive    Decelerations:  none    Pryor: contractions absent     Assessment: reactive    Plan:  NST reactive and reassuring      Delvin Chapin MD  2025; 11:37 AM         [1]   Current Facility-Administered Medications   Medication Dose Route Frequency Provider Last Rate Last Admin    acetaminophen tablet 500 mg  500 mg Oral Q6H PRN Delvin Chapin MD        lactated ringers infusion   Intravenous Continuous Delvin Chapin MD        ondansetron disintegrating tablet 8 mg  8 mg Oral Q8H PRN Delvin Chapin MD         Current Outpatient Medications   Medication Sig Dispense Refill    folic acid (FOLVITE) 1 MG tablet Take 4 tablets (4 mg total) by mouth once daily. 100 tablet 3    PNV,calcium 72/iron/folic acid (PRENATAL VITAMIN) Tab Take 1 tablet by mouth once daily.

## 2025-06-21 NOTE — NURSING
Md notified of pt progress. NST is reactive, denies any lower back pain at present time. Md ordered vaginal check. Md stated if pt is not dilated she is able to return home.

## 2025-06-25 ENCOUNTER — PROCEDURE VISIT (OUTPATIENT)
Dept: OBSTETRICS AND GYNECOLOGY | Facility: CLINIC | Age: 35
End: 2025-06-25
Payer: MEDICAID

## 2025-06-25 ENCOUNTER — ROUTINE PRENATAL (OUTPATIENT)
Dept: OBSTETRICS AND GYNECOLOGY | Facility: CLINIC | Age: 35
End: 2025-06-25
Payer: MEDICAID

## 2025-06-25 VITALS
WEIGHT: 189.81 LBS | BODY MASS INDEX: 29.73 KG/M2 | HEART RATE: 92 BPM | DIASTOLIC BLOOD PRESSURE: 70 MMHG | SYSTOLIC BLOOD PRESSURE: 110 MMHG

## 2025-06-25 DIAGNOSIS — Z3A.33 33 WEEKS GESTATION OF PREGNANCY: ICD-10-CM

## 2025-06-25 DIAGNOSIS — D57.1 MATERNAL SICKLE CELL ANEMIA AFFECTING PREGNANCY, ANTEPARTUM: ICD-10-CM

## 2025-06-25 DIAGNOSIS — O99.019 MATERNAL SICKLE CELL ANEMIA AFFECTING PREGNANCY, ANTEPARTUM: ICD-10-CM

## 2025-06-25 DIAGNOSIS — O09.93 HIGH-RISK PREGNANCY IN THIRD TRIMESTER: Primary | ICD-10-CM

## 2025-06-25 DIAGNOSIS — Z36.2 ENCOUNTER FOR FOLLOW-UP ULTRASOUND OF FETAL ANATOMY: ICD-10-CM

## 2025-06-25 PROCEDURE — 99999 PR PBB SHADOW E&M-EST. PATIENT-LVL III: CPT | Mod: PBBFAC,,, | Performed by: OBSTETRICS & GYNECOLOGY

## 2025-06-25 PROCEDURE — 99213 OFFICE O/P EST LOW 20 MIN: CPT | Mod: TH,S$PBB,, | Performed by: OBSTETRICS & GYNECOLOGY

## 2025-06-25 PROCEDURE — 76819 FETAL BIOPHYS PROFIL W/O NST: CPT | Mod: PBBFAC | Performed by: STUDENT IN AN ORGANIZED HEALTH CARE EDUCATION/TRAINING PROGRAM

## 2025-06-25 PROCEDURE — 99213 OFFICE O/P EST LOW 20 MIN: CPT | Mod: PBBFAC,TH | Performed by: OBSTETRICS & GYNECOLOGY

## 2025-06-25 NOTE — PROGRESS NOTES
Patient will have ultrasound this afternoon.  Patient states she did have some spotting which resolved.  She had back pain and leg pain 1 day last week but did not feel like a crisis to her.  Patient states she is not 100% sure about tubal ligation.  Counseling was done.  Patient has Maternal-Fetal Medicine appointment in 2 weeks  Patient with no complaints. Denies vaginal bleeding or contractions. Good FM. RTC in 2 weeks.         Vitals signs, FHTs, urine dip, and PE findings documented, reviewed and available in OB flow chart.       I spent a total of 20 minutes on the day of the visit.This includes face to face time and non-face to face time preparing to see the patient (eg, review of tests), Obtaining and/or reviewing separately obtained history, Documenting clinical information in the electronic or other health record, Independently interpreting resultsand communicating results to the patient/family/caregiver, or Care coordination.

## 2025-06-26 ENCOUNTER — TELEPHONE (OUTPATIENT)
Dept: OBSTETRICS AND GYNECOLOGY | Facility: CLINIC | Age: 35
End: 2025-06-26
Payer: MEDICAID

## 2025-06-26 NOTE — TELEPHONE ENCOUNTER
----- Message from Med Assistant Gastelum sent at 2025  1:31 PM CDT -----  Regarding: FMLA paperwork  Boni Duarte  MRN: [id number]  : 1990  PCP: Cal Clay    [place]          [phone]        MESSAGE:     Good afternoon!!     Pt called wanting to know if her FMLA paperwork was completed. I wasn't really sure on protocol and processing time so I told her I would send a message.     Thanks,   Oriana Uriostegui', CMA

## 2025-06-30 ENCOUNTER — LAB VISIT (OUTPATIENT)
Dept: LAB | Facility: HOSPITAL | Age: 35
End: 2025-06-30
Attending: INTERNAL MEDICINE
Payer: MEDICAID

## 2025-06-30 DIAGNOSIS — E83.19 HEMOSIDEROSIS: ICD-10-CM

## 2025-06-30 DIAGNOSIS — D57.1 SICKLE CELL DISEASE WITHOUT CRISIS: ICD-10-CM

## 2025-06-30 LAB
ABSOLUTE EOSINOPHIL (OHS): 0.18 K/UL
ABSOLUTE MONOCYTE (OHS): 1.36 K/UL (ref 0.3–1)
ABSOLUTE NEUTROPHIL COUNT (OHS): 10.26 K/UL (ref 1.8–7.7)
ALBUMIN SERPL BCP-MCNC: 2.8 G/DL (ref 3.5–5.2)
ALP SERPL-CCNC: 140 UNIT/L (ref 40–150)
ALT SERPL W/O P-5'-P-CCNC: 23 UNIT/L (ref 10–44)
ANION GAP (OHS): 10 MMOL/L (ref 8–16)
AST SERPL-CCNC: 80 UNIT/L (ref 11–45)
BASOPHILS # BLD AUTO: 0.03 K/UL
BASOPHILS NFR BLD AUTO: 0.2 %
BILIRUB SERPL-MCNC: 4.1 MG/DL (ref 0.1–1)
BUN SERPL-MCNC: 6 MG/DL (ref 6–20)
CALCIUM SERPL-MCNC: 8.5 MG/DL (ref 8.7–10.5)
CHLORIDE SERPL-SCNC: 109 MMOL/L (ref 95–110)
CO2 SERPL-SCNC: 20 MMOL/L (ref 23–29)
CREAT SERPL-MCNC: 0.5 MG/DL (ref 0.5–1.4)
ERYTHROCYTE [DISTWIDTH] IN BLOOD BY AUTOMATED COUNT: 21.2 % (ref 11.5–14.5)
FERRITIN SERPL-MCNC: 635.2 NG/ML (ref 20–300)
GFR SERPLBLD CREATININE-BSD FMLA CKD-EPI: >60 ML/MIN/1.73/M2
GLUCOSE SERPL-MCNC: 98 MG/DL (ref 70–110)
HCT VFR BLD AUTO: 23.3 % (ref 37–48.5)
HGB BLD-MCNC: 8.6 GM/DL (ref 12–16)
IMM GRANULOCYTES # BLD AUTO: 0.13 K/UL (ref 0–0.04)
IMM GRANULOCYTES NFR BLD AUTO: 0.9 % (ref 0–0.5)
LYMPHOCYTES # BLD AUTO: 2.17 K/UL (ref 1–4.8)
MCH RBC QN AUTO: 31.6 PG (ref 27–31)
MCHC RBC AUTO-ENTMCNC: 36.9 G/DL (ref 32–36)
MCV RBC AUTO: 86 FL (ref 82–98)
NUCLEATED RBC (/100WBC) (OHS): 12 /100 WBC
PLATELET # BLD AUTO: 402 K/UL (ref 150–450)
PMV BLD AUTO: 10.4 FL (ref 9.2–12.9)
POTASSIUM SERPL-SCNC: 4.2 MMOL/L (ref 3.5–5.1)
PROT SERPL-MCNC: 7.4 GM/DL (ref 6–8.4)
RBC # BLD AUTO: 2.72 M/UL (ref 4–5.4)
RELATIVE EOSINOPHIL (OHS): 1.3 %
RELATIVE LYMPHOCYTE (OHS): 15.4 % (ref 18–48)
RELATIVE MONOCYTE (OHS): 9.6 % (ref 4–15)
RELATIVE NEUTROPHIL (OHS): 72.6 % (ref 38–73)
SODIUM SERPL-SCNC: 139 MMOL/L (ref 136–145)
WBC # BLD AUTO: 14.13 K/UL (ref 3.9–12.7)

## 2025-06-30 PROCEDURE — 82728 ASSAY OF FERRITIN: CPT

## 2025-06-30 PROCEDURE — 36415 COLL VENOUS BLD VENIPUNCTURE: CPT

## 2025-06-30 PROCEDURE — 85025 COMPLETE CBC W/AUTO DIFF WBC: CPT

## 2025-06-30 PROCEDURE — 84450 TRANSFERASE (AST) (SGOT): CPT

## 2025-07-03 ENCOUNTER — TELEPHONE (OUTPATIENT)
Dept: OBSTETRICS AND GYNECOLOGY | Facility: CLINIC | Age: 35
End: 2025-07-03
Payer: MEDICAID

## 2025-07-03 NOTE — TELEPHONE ENCOUNTER
----- Message from Brooke sent at 7/3/2025  2:00 PM CDT -----  Contact: Self  Boni Duarte  MRN: [id number]  Home Phone      [phone]  Work Phone      Not on file.  Mobile          [phone]  Mobile          Not on file.    Patient Care Team:  Cal Clay MD as PCP - General (Family Medicine)  Mariela Pathak MD as Consulting Physician (Obstetrics and Gynecology)  OB? Yes, 34w4d  What phone number can you be reached at? [phone]  Message: calling in regards to FMLA paperwork

## 2025-07-03 NOTE — TELEPHONE ENCOUNTER
Patient called and said that her company keeps telling her that her FMLA paperwork has not been received and it needs to be turned in by 07/07/2025. I advised the patient that I will speak to Dr. Desir' nurse so we can have it faxed again. I see that it was to be filled out and faxed on 06/26/2025. Patient informed that his nurse will not be back in until 07/07/2025. She verbalized understanding.

## 2025-07-06 ENCOUNTER — PATIENT MESSAGE (OUTPATIENT)
Dept: OTHER | Facility: OTHER | Age: 35
End: 2025-07-06
Payer: MEDICAID

## 2025-07-09 ENCOUNTER — ROUTINE PRENATAL (OUTPATIENT)
Dept: OBSTETRICS AND GYNECOLOGY | Facility: CLINIC | Age: 35
End: 2025-07-09
Payer: MEDICAID

## 2025-07-09 VITALS
HEART RATE: 91 BPM | WEIGHT: 192.19 LBS | BODY MASS INDEX: 30.1 KG/M2 | SYSTOLIC BLOOD PRESSURE: 116 MMHG | DIASTOLIC BLOOD PRESSURE: 72 MMHG

## 2025-07-09 DIAGNOSIS — O99.019 MATERNAL SICKLE CELL ANEMIA AFFECTING PREGNANCY, ANTEPARTUM: ICD-10-CM

## 2025-07-09 DIAGNOSIS — D57.1 MATERNAL SICKLE CELL ANEMIA AFFECTING PREGNANCY, ANTEPARTUM: ICD-10-CM

## 2025-07-09 DIAGNOSIS — N76.4 VULVAR ABSCESS: ICD-10-CM

## 2025-07-09 DIAGNOSIS — Z3A.35 35 WEEKS GESTATION OF PREGNANCY: ICD-10-CM

## 2025-07-09 DIAGNOSIS — O09.893 SUPERVISION OF OTHER HIGH RISK PREGNANCIES, THIRD TRIMESTER: Primary | ICD-10-CM

## 2025-07-09 PROCEDURE — 99213 OFFICE O/P EST LOW 20 MIN: CPT | Mod: TH,S$PBB,, | Performed by: OBSTETRICS & GYNECOLOGY

## 2025-07-09 PROCEDURE — 99212 OFFICE O/P EST SF 10 MIN: CPT | Mod: PBBFAC,TH | Performed by: OBSTETRICS & GYNECOLOGY

## 2025-07-09 PROCEDURE — 87653 STREP B DNA AMP PROBE: CPT | Performed by: OBSTETRICS & GYNECOLOGY

## 2025-07-09 PROCEDURE — 99999 PR PBB SHADOW E&M-EST. PATIENT-LVL II: CPT | Mod: PBBFAC,,, | Performed by: OBSTETRICS & GYNECOLOGY

## 2025-07-09 RX ORDER — CEPHALEXIN 500 MG/1
500 CAPSULE ORAL EVERY 8 HOURS
Qty: 21 CAPSULE | Refills: 0 | Status: SHIPPED | OUTPATIENT
Start: 2025-07-09 | End: 2025-07-16

## 2025-07-09 NOTE — PROGRESS NOTES
Patient with no complaints except fatigue.  She also mentions that she had a vulvar abscess that spontaneously started draining pus.  Denies vaginal bleeding or contractions. Good FM. GBS collected today. Labor precautions discused with patient.    Vulvar abscess  Spontaneously draining suspected bartholin abscess, moderate purulent drainage expressed.  Keflex.    Sickle cell anemia  No signs of crises  CBC from 6/30 reviewed  Pt has MFM appointment tomorrow in [place].      RTC in 1 week.     Vitals signs, FHTs, urine dip, and PE findings documented, reviewed and available in OB flow chart.       I spent a total of 20 minutes on the day of the visit.This includes face to face time and non-face to face time preparing to see the patient (eg, review of tests), Obtaining and/or reviewing separately obtained history, Documenting clinical information in the electronic or other health record, Independently interpreting resultsand communicating results to the patient/family/caregiver, or Care coordination.

## 2025-07-10 ENCOUNTER — OFFICE VISIT (OUTPATIENT)
Dept: MATERNAL FETAL MEDICINE | Facility: CLINIC | Age: 35
End: 2025-07-10
Payer: MEDICAID

## 2025-07-10 ENCOUNTER — PROCEDURE VISIT (OUTPATIENT)
Dept: MATERNAL FETAL MEDICINE | Facility: CLINIC | Age: 35
End: 2025-07-10
Payer: MEDICAID

## 2025-07-10 VITALS
HEIGHT: 67 IN | HEART RATE: 103 BPM | WEIGHT: 189.94 LBS | DIASTOLIC BLOOD PRESSURE: 71 MMHG | BODY MASS INDEX: 29.81 KG/M2 | SYSTOLIC BLOOD PRESSURE: 114 MMHG

## 2025-07-10 DIAGNOSIS — O99.019 MATERNAL SICKLE CELL ANEMIA AFFECTING PREGNANCY, ANTEPARTUM: Primary | ICD-10-CM

## 2025-07-10 DIAGNOSIS — D57.1 MATERNAL SICKLE CELL ANEMIA AFFECTING PREGNANCY, ANTEPARTUM: Primary | ICD-10-CM

## 2025-07-10 DIAGNOSIS — D57.1 MATERNAL SICKLE CELL ANEMIA AFFECTING PREGNANCY, ANTEPARTUM: ICD-10-CM

## 2025-07-10 DIAGNOSIS — O99.019 MATERNAL SICKLE CELL ANEMIA AFFECTING PREGNANCY, ANTEPARTUM: ICD-10-CM

## 2025-07-10 NOTE — ASSESSMENT & PLAN NOTE
Last crisis this pregnancy in April  FOB not a carrier per patient but no records  G1 pregnancy in  uncomplicated, no crises, daughter doing well     The patient was counseled about sickle cell anemia in pregnancy. The patient was counseled regarding maternal risks, which include but are not limited to: sickle cell crisis and acute chest syndrome, antepartum hospitalization, venous thromboembolic disease, stroke, preeclampsia/eclampsia,  delivery, infectious morbidity,  delivery, and death. The patient was counseled regarding fetal risks which include but are not limited to: miscarriage, fetal growth restriction, stillbirth, low birthweight, and prematurity. The patient was counseled regarding the inheritance risk. Sickle cell disease is inherited in an autosomal recessive fashion. The carrier rate in the  population is 1:12.     -  CBC 8.6/23%.  I recommend type and cross x2 units when she goes in for delivery.    Recommendations:  Laboratory evaluation:  For partner: Hemoglobin electrophoresis and CBC (and additional testing for alpha or beta thalassemia as indicated) ( screening to be performed after birth)  Prenatal diagnosis is available if desired/indicated  Baseline labs: CBC and CMP reviewed. Needs P/C ratio or 24 hour urine protein  Urine culture q trimester  CBC monthly  Medications:  Folic acid 4 mg  Aspirin 81 mg, starting at 12 weeks gestation- Rx sent   Avoid hydroxyurea in pregnancy. She is not on any medications currently.   Limited data on Jadenu in pregnancy. If it is required for iron chelation per Hematology, please re-engage MFM so we can discuss r/b/a  Narcotics per heme/onc-avoid abrupt cessation and avoid NSAIDs, particularly after 32 weeks  Avoid iron supplementation due to risks of iron overload. Previously instructed to hold her PNV and iron supplement. Can try PNV without iron. Encouraged discussion with Dr. Holguin.   Prophylactic lovenox or  heparin while admitted to the hospital antepartum or postpartum  Vaccinations (per primary physician):  Hep B, pneumovax, flu vaccination annually, and COVID vaccination as appropriate  Baseline maternal evaluation (ordered by primary OB):  Maternal echo - needs (last in )  EKG - reviewed from   Pulmonary function studies, if not performed previously  Ophthalmic exam - needs, reports last >3 years ago  Ultrasounds (with MFM):  Detailed anatomic survey at 19-20 weeks - completed 3/26  Serial growth ultrasounds starting at 26-28 weeks, to be performed every 4-6 weeks -  Antepartum testing twice weekly at 32 weeks (to be facilitated by primary OB)  Delivery timin weeks, unless indicated earlier

## 2025-07-10 NOTE — PROGRESS NOTES
"Maternal Fetal Medicine follow up consult    SUBJECTIVE:     Boni Duarte is a 34 y.o.  female with IUP at 35w4d who is seen in follow up consultation by M.  Pregnancy complications include:   Problem   - Maternal sickle cell anemia affecting pregnancy, antepartum         Boni presents for routine follow up appointment.  Denies LOF, VB, contractions. Positive fetal movement.  She has some blood on her underwear for the last couple of days.  She has a cervical exam yesterday and also has a draining Bartholin's gland cyst with some blood.  Her last CBC was  which showed H&H of 8.6 and 23.3.  She is feeling fatigued but otherwise okay.  She is on prenatal vitamins without iron.  She just had her folic acid refilled by Hematology.  She recently had a Bartholin gland abscess and is on Keflex.  It is spontaneously draining.    Previous notes reviewed.   No changes to medical, surgical, family, social, or obstetric history.  Interval history since last M visit: see above  Medications reviewed.    Care team members:  Dr Pathak - Primary OB     OBJECTIVE:   /71 (BP Location: Right arm, Patient Position: Sitting)   Pulse 103   Ht 5' 7" (1.702 m)   Wt 86.1 kg (189 lb 14.8 oz)   LMP 2024 (Approximate)   BMI 29.75 kg/m²     Ultrasound performed. See viewpoint for full ultrasound report.    A viable carpenter pregnancy is visualized in cephalic presentation. Estimated fetal weight is at the 23rd percentile with an abdominal circumference at the 68th percentile. No fetal abnormalities are noted and previous anatomic survey was normal. Amniotic fluid volume is normal. Placenta is anterior. Biophysical profile is 8/8.   ASSESSMENT/PLAN:     34 y.o.  female with IUP at 35w4d    - Maternal sickle cell anemia affecting pregnancy, antepartum  Last crisis this pregnancy in April  FOB not a carrier per patient but no records  G1 pregnancy in  uncomplicated, no crises, daughter doing " well     The patient was counseled about sickle cell anemia in pregnancy. The patient was counseled regarding maternal risks, which include but are not limited to: sickle cell crisis and acute chest syndrome, antepartum hospitalization, venous thromboembolic disease, stroke, preeclampsia/eclampsia,  delivery, infectious morbidity,  delivery, and death. The patient was counseled regarding fetal risks which include but are not limited to: miscarriage, fetal growth restriction, stillbirth, low birthweight, and prematurity. The patient was counseled regarding the inheritance risk. Sickle cell disease is inherited in an autosomal recessive fashion. The carrier rate in the  population is 1:12.     -  CBC 8.6/23%.  I recommend type and cross x2 units when she goes in for delivery.    Recommendations:  Laboratory evaluation:  For partner: Hemoglobin electrophoresis and CBC (and additional testing for alpha or beta thalassemia as indicated) ([place] screening to be performed after birth)  Prenatal diagnosis is available if desired/indicated  Baseline labs: CBC and CMP reviewed. Needs P/C ratio or 24 hour urine protein  Urine culture q trimester  CBC monthly  Medications:  Folic acid 4 mg  Aspirin 81 mg, starting at 12 weeks gestation- Rx sent   Avoid hydroxyurea in pregnancy. She is not on any medications currently.   Limited data on Jadenu in pregnancy. If it is required for iron chelation per Hematology, please re-engage MFM so we can discuss r/b/a  Narcotics per heme/onc-avoid abrupt cessation and avoid NSAIDs, particularly after 32 weeks  Avoid iron supplementation due to risks of iron overload. Previously instructed to hold her PNV and iron supplement. Can try PNV without iron. Encouraged discussion with Dr. Holguin.   Prophylactic lovenox or heparin while admitted to the hospital antepartum or postpartum  Vaccinations (per primary physician):  Hep B, pneumovax, flu vaccination  annually, and COVID vaccination as appropriate  Baseline maternal evaluation (ordered by primary OB):  Maternal echo - needs (last in )  EKG - reviewed from   Pulmonary function studies, if not performed previously  Ophthalmic exam - needs, reports last >3 years ago  Ultrasounds (with MFM):  Detailed anatomic survey at 19-20 weeks - completed 3/26  Serial growth ultrasounds starting at 26-28 weeks, to be performed every 4-6 weeks -  Antepartum testing twice weekly at 32 weeks (to be facilitated by primary OB)  Delivery timin weeks, unless indicated earlier        Continue to follow weekly with OB for NSTs   No further MFM visits have been scheduled    Meme Morales MD  Maternal Fetal Medicine

## 2025-07-12 LAB — GROUP B STREPTOCOCCUS, PCR (OHS): NEGATIVE

## 2025-07-16 ENCOUNTER — ROUTINE PRENATAL (OUTPATIENT)
Dept: OBSTETRICS AND GYNECOLOGY | Facility: CLINIC | Age: 35
End: 2025-07-16
Payer: MEDICAID

## 2025-07-16 VITALS
HEART RATE: 95 BPM | DIASTOLIC BLOOD PRESSURE: 75 MMHG | SYSTOLIC BLOOD PRESSURE: 116 MMHG | BODY MASS INDEX: 29.66 KG/M2 | WEIGHT: 189.38 LBS

## 2025-07-16 DIAGNOSIS — O09.93 HIGH-RISK PREGNANCY IN THIRD TRIMESTER: Primary | ICD-10-CM

## 2025-07-16 DIAGNOSIS — D57.1 MATERNAL SICKLE CELL ANEMIA AFFECTING PREGNANCY, ANTEPARTUM: ICD-10-CM

## 2025-07-16 DIAGNOSIS — O99.019 MATERNAL SICKLE CELL ANEMIA AFFECTING PREGNANCY, ANTEPARTUM: ICD-10-CM

## 2025-07-16 PROCEDURE — 99213 OFFICE O/P EST LOW 20 MIN: CPT | Mod: PBBFAC,TH | Performed by: OBSTETRICS & GYNECOLOGY

## 2025-07-16 PROCEDURE — 99999 PR PBB SHADOW E&M-EST. PATIENT-LVL III: CPT | Mod: PBBFAC,,, | Performed by: OBSTETRICS & GYNECOLOGY

## 2025-07-16 PROCEDURE — 99213 OFFICE O/P EST LOW 20 MIN: CPT | Mod: TH,S$PBB,, | Performed by: OBSTETRICS & GYNECOLOGY

## 2025-07-23 ENCOUNTER — PROCEDURE VISIT (OUTPATIENT)
Dept: OBSTETRICS AND GYNECOLOGY | Facility: CLINIC | Age: 35
End: 2025-07-23
Payer: MEDICAID

## 2025-07-23 ENCOUNTER — ROUTINE PRENATAL (OUTPATIENT)
Dept: OBSTETRICS AND GYNECOLOGY | Facility: CLINIC | Age: 35
End: 2025-07-23
Payer: MEDICAID

## 2025-07-23 VITALS
DIASTOLIC BLOOD PRESSURE: 76 MMHG | HEART RATE: 92 BPM | BODY MASS INDEX: 30.26 KG/M2 | WEIGHT: 193.19 LBS | SYSTOLIC BLOOD PRESSURE: 124 MMHG

## 2025-07-23 DIAGNOSIS — D57.1 MATERNAL SICKLE CELL ANEMIA AFFECTING PREGNANCY, ANTEPARTUM: Primary | ICD-10-CM

## 2025-07-23 DIAGNOSIS — O99.019 MATERNAL SICKLE CELL ANEMIA AFFECTING PREGNANCY, ANTEPARTUM: Primary | ICD-10-CM

## 2025-07-23 PROCEDURE — 99999 PR PBB SHADOW E&M-EST. PATIENT-LVL III: CPT | Mod: PBBFAC,,, | Performed by: OBSTETRICS & GYNECOLOGY

## 2025-07-23 PROCEDURE — 99213 OFFICE O/P EST LOW 20 MIN: CPT | Mod: TH,S$PBB,, | Performed by: OBSTETRICS & GYNECOLOGY

## 2025-07-23 PROCEDURE — 99213 OFFICE O/P EST LOW 20 MIN: CPT | Mod: PBBFAC,TH | Performed by: OBSTETRICS & GYNECOLOGY

## 2025-07-23 PROCEDURE — 76819 FETAL BIOPHYS PROFIL W/O NST: CPT | Mod: PBBFAC | Performed by: OBSTETRICS & GYNECOLOGY

## 2025-07-23 NOTE — PROGRESS NOTES
Patient will have biophysical profile today  Patient with no complaints. Denies vaginal bleeding or contractions. Good FM. RTC in 1 week.         Vitals signs, FHTs, urine dip, and PE findings documented, reviewed and available in OB flow chart.       I spent a total of 20 minutes on the day of the visit.This includes face to face time and non-face to face time preparing to see the patient (eg, review of tests), Obtaining and/or reviewing separately obtained history, Documenting clinical information in the electronic or other health record, Independently interpreting resultsand communicating results to the patient/family/caregiver, or Care coordination.

## 2025-07-30 ENCOUNTER — PROCEDURE VISIT (OUTPATIENT)
Dept: OBSTETRICS AND GYNECOLOGY | Facility: CLINIC | Age: 35
End: 2025-07-30
Payer: MEDICAID

## 2025-07-30 ENCOUNTER — ROUTINE PRENATAL (OUTPATIENT)
Dept: OBSTETRICS AND GYNECOLOGY | Facility: CLINIC | Age: 35
End: 2025-07-30
Payer: MEDICAID

## 2025-07-30 VITALS
HEART RATE: 74 BPM | WEIGHT: 198 LBS | SYSTOLIC BLOOD PRESSURE: 110 MMHG | BODY MASS INDEX: 31.01 KG/M2 | DIASTOLIC BLOOD PRESSURE: 66 MMHG

## 2025-07-30 DIAGNOSIS — D57.1 MATERNAL SICKLE CELL ANEMIA AFFECTING PREGNANCY, ANTEPARTUM: ICD-10-CM

## 2025-07-30 DIAGNOSIS — O99.019 MATERNAL SICKLE CELL ANEMIA AFFECTING PREGNANCY, ANTEPARTUM: ICD-10-CM

## 2025-07-30 DIAGNOSIS — Z34.90 ENCOUNTER FOR ELECTIVE INDUCTION OF LABOR: Primary | ICD-10-CM

## 2025-07-30 PROCEDURE — 99213 OFFICE O/P EST LOW 20 MIN: CPT | Mod: PBBFAC,TH | Performed by: OBSTETRICS & GYNECOLOGY

## 2025-07-30 PROCEDURE — 99999 PR PBB SHADOW E&M-EST. PATIENT-LVL III: CPT | Mod: PBBFAC,,, | Performed by: OBSTETRICS & GYNECOLOGY

## 2025-07-30 PROCEDURE — 99213 OFFICE O/P EST LOW 20 MIN: CPT | Mod: TH,S$PBB,, | Performed by: OBSTETRICS & GYNECOLOGY

## 2025-07-30 PROCEDURE — 76819 FETAL BIOPHYS PROFIL W/O NST: CPT | Mod: PBBFAC | Performed by: STUDENT IN AN ORGANIZED HEALTH CARE EDUCATION/TRAINING PROGRAM

## 2025-07-30 RX ORDER — SIMETHICONE 80 MG
1 TABLET,CHEWABLE ORAL 4 TIMES DAILY PRN
OUTPATIENT
Start: 2025-07-30

## 2025-07-30 RX ORDER — OXYTOCIN-SODIUM CHLORIDE 0.9% IV SOLN 30 UNIT/500ML 30-0.9/5 UT/ML-%
10 SOLUTION INTRAVENOUS ONCE AS NEEDED
OUTPATIENT
Start: 2025-07-30 | End: 2036-12-26

## 2025-07-30 RX ORDER — BUTORPHANOL TARTRATE 2 MG/ML
2 INJECTION, SOLUTION INTRAMUSCULAR; INTRAVENOUS
OUTPATIENT
Start: 2025-07-30

## 2025-07-30 RX ORDER — MISOPROSTOL 200 UG/1
800 TABLET ORAL ONCE AS NEEDED
OUTPATIENT
Start: 2025-07-30 | End: 2036-12-26

## 2025-07-30 RX ORDER — TRANEXAMIC ACID 10 MG/ML
1000 INJECTION, SOLUTION INTRAVENOUS EVERY 30 MIN PRN
OUTPATIENT
Start: 2025-07-30

## 2025-07-30 RX ORDER — DIPHENOXYLATE HYDROCHLORIDE AND ATROPINE SULFATE 2.5; .025 MG/1; MG/1
2 TABLET ORAL EVERY 6 HOURS PRN
OUTPATIENT
Start: 2025-07-30

## 2025-07-30 RX ORDER — ONDANSETRON 4 MG/1
8 TABLET, ORALLY DISINTEGRATING ORAL EVERY 8 HOURS PRN
OUTPATIENT
Start: 2025-07-30

## 2025-07-30 RX ORDER — SODIUM CHLORIDE 9 MG/ML
INJECTION, SOLUTION INTRAVENOUS
OUTPATIENT
Start: 2025-07-30

## 2025-07-30 RX ORDER — LIDOCAINE HYDROCHLORIDE 10 MG/ML
10 INJECTION, SOLUTION INFILTRATION; PERINEURAL ONCE AS NEEDED
OUTPATIENT
Start: 2025-07-30 | End: 2036-12-26

## 2025-07-30 RX ORDER — OXYTOCIN-SODIUM CHLORIDE 0.9% IV SOLN 30 UNIT/500ML 30-0.9/5 UT/ML-%
30 SOLUTION INTRAVENOUS ONCE AS NEEDED
OUTPATIENT
Start: 2025-07-30 | End: 2036-12-26

## 2025-07-30 RX ORDER — CARBOPROST TROMETHAMINE 250 UG/ML
250 INJECTION, SOLUTION INTRAMUSCULAR
OUTPATIENT
Start: 2025-07-30

## 2025-07-30 RX ORDER — MISOPROSTOL 200 UG/1
800 TABLET ORAL ONCE AS NEEDED
OUTPATIENT
Start: 2025-07-30

## 2025-07-30 RX ORDER — OXYTOCIN 10 [USP'U]/ML
10 INJECTION, SOLUTION INTRAMUSCULAR; INTRAVENOUS ONCE AS NEEDED
OUTPATIENT
Start: 2025-07-30 | End: 2036-12-26

## 2025-07-30 RX ORDER — SODIUM CHLORIDE, SODIUM LACTATE, POTASSIUM CHLORIDE, CALCIUM CHLORIDE 600; 310; 30; 20 MG/100ML; MG/100ML; MG/100ML; MG/100ML
INJECTION, SOLUTION INTRAVENOUS CONTINUOUS
OUTPATIENT
Start: 2025-07-30

## 2025-07-30 RX ORDER — CALCIUM CARBONATE 200(500)MG
500 TABLET,CHEWABLE ORAL 3 TIMES DAILY PRN
OUTPATIENT
Start: 2025-07-30

## 2025-07-30 RX ORDER — METHYLERGONOVINE MALEATE 0.2 MG/ML
200 INJECTION INTRAVENOUS ONCE AS NEEDED
OUTPATIENT
Start: 2025-07-30 | End: 2036-12-26

## 2025-07-30 NOTE — H&P
History and Physical  Obstetrics      SUBJECTIVE:     Boni Duarte is a 34 y.o.  female  at 38w3d weeks gestation with an Estimated Date of Delivery: 8/10/25 who is admitted complaining of Induction of labor for: Elective at term.  Patient also with history of sickle cell disease.  She denies Decreased Fetal Movement, Leakage of Fluid, and Vaginal Bleeding. Fetal Movement: normal.    She has been followed prenatally with the following prenatal complications: There are no active hospital problems to display for this patient.        HISTORY:     Prior to Admission medications    Medication Sig Start Date End Date Taking? Authorizing Provider   folic acid (FOLVITE) 1 MG tablet Take 4 tablets (4 mg total) by mouth once daily. 25 Yes Meng Holguin MD   PNV,calcium 72/iron/folic acid (PRENATAL VITAMIN) Tab Take 1 tablet by mouth once daily. 25 Yes Mariela Pathak MD      Past Medical History:   Diagnosis Date    Anemia     Encounter for blood transfusion     Pneumonia     Pregnancy         Sickle cell anemia     Sickle cell disease 2015    UTI (urinary tract infection)      Past Surgical History:   Procedure Laterality Date    CHOLECYSTECTOMY       Family History   Problem Relation Name Age of Onset    No Known Problems Mother      No Known Problems Father       Social History[1]  OB History    Para Term  AB Living   2 1 1 0 0 1   SAB IAB Ectopic Multiple Live Births   0 0 0 0 1      # Outcome Date GA Lbr Narciso/2nd Weight Sex Type Anes PTL Lv   2 Current            1 Term 20 38w3d  2.778 kg (6 lb 2 oz) F Vag-Spont  N MARILYN       Allergy:   Review of patient's allergies indicates:  No Known Allergies       Review of Systems      OBJECTIVE:   [unfilled]      Physical Exam:  General:  alert,normal appearing gravid female   Skin:  Skin color, texture, turgor normal. No rashes or lesions   HEENT:  conjunctivae/corneas clear. ANGLE   Lungs:   clear to auscultation bilaterally   Heart:  regular rate and rhythm, S1, S2 normal, no murmur, click, rub or gallop   Breasts:  Nipples are protruding and have no nipple discharge. No palpable masses, erythema, skin changes, tenderness, or adenopathy.   Abdomen:  soft, non-tender; bowel sounds normal   Uterine Size:  consistent with dates   Presentations:  cephalic   FHT:  145 BPM   Pelvis: External genitalia: normal external genitalia without lesions  Vaginal: without lesions or discharge   Cervix:     Dilation: 1 cm    Effacement: 30 %    Station:  -3    Consistency: medium    Position: posterior       OB Lab History:     Group & Rh   Date Value Ref Range Status   04/04/2025 B POS  Final   03/08/2025 B POS  Final   06/17/2010 B POS  Final     INDIRECT PONCHO   Date Value Ref Range Status   06/17/2010 NEG  Final     Indirect Poncho   Date Value Ref Range Status   04/04/2025 NEG  Final   03/08/2025 NEG  Final     Lab Results   Component Value Date    WBC 14.13 (H) 06/30/2025    HGB 8.6 (L) 06/30/2025    HCT 23.3 (L) 06/30/2025    MCV 86 06/30/2025     06/30/2025         Lab Results   Component Value Date    TSH 2.667 02/17/2025     Lab Results   Component Value Date    RUBELLAIGGAN 64.4 02/17/2025     Lab Results   Component Value Date    RPR Non-reactive 01/29/2019     HIV 1/2 Ag/Ab   Date Value Ref Range Status   05/19/2025 Non-Reactive Non-Reactive Final   02/17/2025 Non-reactive Non-reactive Final     Hepatitis B Surface Ag   Date Value Ref Range Status   02/17/2025 Non-reactive Non-reactive Final     Results for orders placed or performed in visit on 05/19/25   OB Glucose Screen    Collection Time: 05/19/25  7:42 AM   Result Value Ref Range    OB Glucose Screen 94 70 - 140 mg/dL     No results found for this or any previous visit.  No results found for this or any previous visit.  Results for orders placed or performed in visit on 10/11/23   C. trachomatis/N. gonorrhoeae by AMP DNA    Collection Time:  10/11/23 10:22 AM   Result Value Ref Range    Chlamydia, Amplified DNA Not Detected Not Detected    N gonorrhoeae, amplified DNA Not Detected Not Detected       ASSESSMENT/PLAN:     IUP 38w3d gestation  Problem List[2]      PLAN:   Intervention: IV Pitocin induction      Patient cleared for Anesthesia:  Epidural    Anesthesia/Surgery risks, benefits, and alternative options discussed and understood by patient/family.             [1]   Social History  Tobacco Use    Smoking status: Never    Smokeless tobacco: Never   Substance Use Topics    Alcohol use: No     Alcohol/week: 0.0 standard drinks of alcohol     Comment: occ    Drug use: No   [2]   Patient Active Problem List  Diagnosis    - Maternal sickle cell anemia affecting pregnancy, antepartum    Hyperbilirubinemia    Sickle cell pain crisis    Acute chest syndrome    Iron overload    Encounter for monitoring of hydroxyurea therapy    High-risk pregnancy in third trimester    Vaginal bleeding    Alteration in comfort associated with uterine contractions    Bartholin cyst, Right sided, s/p I&D 7/2022 and previously in 2020    Vaginal bleeding in pregnancy, third trimester

## 2025-08-04 ENCOUNTER — ANESTHESIA (OUTPATIENT)
Dept: OBSTETRICS AND GYNECOLOGY | Facility: HOSPITAL | Age: 35
End: 2025-08-04
Payer: MEDICAID

## 2025-08-04 ENCOUNTER — ANESTHESIA EVENT (OUTPATIENT)
Dept: OBSTETRICS AND GYNECOLOGY | Facility: HOSPITAL | Age: 35
End: 2025-08-04
Payer: MEDICAID

## 2025-08-04 ENCOUNTER — HOSPITAL ENCOUNTER (INPATIENT)
Facility: HOSPITAL | Age: 35
LOS: 2 days | Discharge: HOME | End: 2025-08-06
Attending: OBSTETRICS & GYNECOLOGY | Admitting: OBSTETRICS & GYNECOLOGY
Payer: MEDICAID

## 2025-08-04 DIAGNOSIS — Z34.90 ENCOUNTER FOR ELECTIVE INDUCTION OF LABOR: ICD-10-CM

## 2025-08-04 LAB
ABSOLUTE EOSINOPHIL (OHS): 0.17 K/UL
ABSOLUTE MONOCYTE (OHS): 1.75 K/UL (ref 0.3–1)
ABSOLUTE NEUTROPHIL COUNT (OHS): 9.79 K/UL (ref 1.8–7.7)
ANISOCYTOSIS BLD QL SMEAR: NORMAL
BASOPHILS # BLD AUTO: 0.05 K/UL
BASOPHILS NFR BLD AUTO: 0.4 %
ERYTHROCYTE [DISTWIDTH] IN BLOOD BY AUTOMATED COUNT: 20.4 % (ref 11.5–14.5)
GROUP & RH: NORMAL
HCT VFR BLD AUTO: 22.4 % (ref 37–48.5)
HGB BLD-MCNC: 8.3 GM/DL (ref 12–16)
IMM GRANULOCYTES # BLD AUTO: 0.16 K/UL (ref 0–0.04)
IMM GRANULOCYTES NFR BLD AUTO: 1.2 % (ref 0–0.5)
INDIRECT COOMBS: NORMAL
LYMPHOCYTES # BLD AUTO: 1.96 K/UL (ref 1–4.8)
MCH RBC QN AUTO: 31.7 PG (ref 27–31)
MCHC RBC AUTO-ENTMCNC: 37.1 G/DL (ref 32–36)
MCV RBC AUTO: 86 FL (ref 82–98)
NUCLEATED RBC (/100WBC) (OHS): 17 /100 WBC
OVALOCYTES BLD QL SMEAR: NORMAL
PLATELET # BLD AUTO: 336 K/UL (ref 150–450)
PMV BLD AUTO: 10.7 FL (ref 9.2–12.9)
POLYCHROMASIA BLD QL SMEAR: NORMAL
RBC # BLD AUTO: 2.62 M/UL (ref 4–5.4)
RELATIVE EOSINOPHIL (OHS): 1.2 %
RELATIVE LYMPHOCYTE (OHS): 14.1 % (ref 18–48)
RELATIVE MONOCYTE (OHS): 12.6 % (ref 4–15)
RELATIVE NEUTROPHIL (OHS): 70.5 % (ref 38–73)
SICKLE CELLS BLD QL SMEAR: NORMAL
SPHEROCYTES BLD QL SMEAR: NORMAL
WBC # BLD AUTO: 13.88 K/UL (ref 3.9–12.7)

## 2025-08-04 PROCEDURE — 72200005 HC VAGINAL DELIVERY LEVEL II

## 2025-08-04 PROCEDURE — 72200006 HC VAGINAL DELIVERY LEVEL III

## 2025-08-04 PROCEDURE — 3E033VJ INTRODUCTION OF OTHER HORMONE INTO PERIPHERAL VEIN, PERCUTANEOUS APPROACH: ICD-10-PCS | Performed by: OBSTETRICS & GYNECOLOGY

## 2025-08-04 PROCEDURE — 51702 INSERT TEMP BLADDER CATH: CPT

## 2025-08-04 PROCEDURE — 72100002 HC LABOR CARE, 1ST 8 HOURS

## 2025-08-04 PROCEDURE — 86900 BLOOD TYPING SEROLOGIC ABO: CPT | Performed by: OBSTETRICS & GYNECOLOGY

## 2025-08-04 PROCEDURE — 11000001 HC ACUTE MED/SURG PRIVATE ROOM

## 2025-08-04 PROCEDURE — 25000003 PHARM REV CODE 250: Performed by: OBSTETRICS & GYNECOLOGY

## 2025-08-04 PROCEDURE — 86920 COMPATIBILITY TEST SPIN: CPT | Performed by: OBSTETRICS & GYNECOLOGY

## 2025-08-04 PROCEDURE — 10907ZC DRAINAGE OF AMNIOTIC FLUID, THERAPEUTIC FROM PRODUCTS OF CONCEPTION, VIA NATURAL OR ARTIFICIAL OPENING: ICD-10-PCS | Performed by: OBSTETRICS & GYNECOLOGY

## 2025-08-04 PROCEDURE — 63600175 PHARM REV CODE 636 W HCPCS: Performed by: OBSTETRICS & GYNECOLOGY

## 2025-08-04 PROCEDURE — 81515 NFCT DS BV&VAGINITIS DNA ALG: CPT | Performed by: OBSTETRICS & GYNECOLOGY

## 2025-08-04 PROCEDURE — 63600175 PHARM REV CODE 636 W HCPCS: Performed by: NURSE ANESTHETIST, CERTIFIED REGISTERED

## 2025-08-04 PROCEDURE — 72100003 HC LABOR CARE, EA. ADDL. 8 HRS

## 2025-08-04 PROCEDURE — 85025 COMPLETE CBC W/AUTO DIFF WBC: CPT | Performed by: OBSTETRICS & GYNECOLOGY

## 2025-08-04 PROCEDURE — 36415 COLL VENOUS BLD VENIPUNCTURE: CPT | Performed by: OBSTETRICS & GYNECOLOGY

## 2025-08-04 PROCEDURE — 59025 FETAL NON-STRESS TEST: CPT

## 2025-08-04 PROCEDURE — 62326 NJX INTERLAMINAR LMBR/SAC: CPT | Performed by: NURSE ANESTHETIST, CERTIFIED REGISTERED

## 2025-08-04 RX ORDER — LIDOCAINE HYDROCHLORIDE 10 MG/ML
10 INJECTION, SOLUTION INFILTRATION; PERINEURAL ONCE AS NEEDED
Status: DISCONTINUED | OUTPATIENT
Start: 2025-08-04 | End: 2025-08-04

## 2025-08-04 RX ORDER — SIMETHICONE 80 MG
1 TABLET,CHEWABLE ORAL 4 TIMES DAILY PRN
Status: DISCONTINUED | OUTPATIENT
Start: 2025-08-04 | End: 2025-08-04

## 2025-08-04 RX ORDER — OXYTOCIN 10 [USP'U]/ML
10 INJECTION, SOLUTION INTRAMUSCULAR; INTRAVENOUS ONCE AS NEEDED
Status: DISCONTINUED | OUTPATIENT
Start: 2025-08-04 | End: 2025-08-04

## 2025-08-04 RX ORDER — ACETAMINOPHEN 325 MG/1
650 TABLET ORAL EVERY 6 HOURS PRN
Status: DISCONTINUED | OUTPATIENT
Start: 2025-08-04 | End: 2025-08-06 | Stop reason: HOSPADM

## 2025-08-04 RX ORDER — KETOROLAC TROMETHAMINE 30 MG/ML
30 INJECTION, SOLUTION INTRAMUSCULAR; INTRAVENOUS EVERY 8 HOURS
Status: DISPENSED | OUTPATIENT
Start: 2025-08-04 | End: 2025-08-05

## 2025-08-04 RX ORDER — SODIUM CHLORIDE 0.9 % (FLUSH) 0.9 %
10 SYRINGE (ML) INJECTION
Status: DISCONTINUED | OUTPATIENT
Start: 2025-08-04 | End: 2025-08-06 | Stop reason: HOSPADM

## 2025-08-04 RX ORDER — CARBOPROST TROMETHAMINE 250 UG/ML
250 INJECTION, SOLUTION INTRAMUSCULAR
Status: DISCONTINUED | OUTPATIENT
Start: 2025-08-04 | End: 2025-08-04

## 2025-08-04 RX ORDER — OXYTOCIN-SODIUM CHLORIDE 0.9% IV SOLN 30 UNIT/500ML 30-0.9/5 UT/ML-%
95 SOLUTION INTRAVENOUS ONCE AS NEEDED
Status: COMPLETED | OUTPATIENT
Start: 2025-08-04 | End: 2025-08-04

## 2025-08-04 RX ORDER — MISOPROSTOL 200 UG/1
800 TABLET ORAL ONCE AS NEEDED
Status: DISCONTINUED | OUTPATIENT
Start: 2025-08-04 | End: 2025-08-04

## 2025-08-04 RX ORDER — ONDANSETRON 8 MG/1
8 TABLET, ORALLY DISINTEGRATING ORAL EVERY 8 HOURS PRN
Status: DISCONTINUED | OUTPATIENT
Start: 2025-08-04 | End: 2025-08-04

## 2025-08-04 RX ORDER — BUTORPHANOL TARTRATE 2 MG/ML
2 INJECTION, SOLUTION INTRAMUSCULAR; INTRAVENOUS
Status: DISCONTINUED | OUTPATIENT
Start: 2025-08-04 | End: 2025-08-04

## 2025-08-04 RX ORDER — ROPIVACAINE HYDROCHLORIDE 2 MG/ML
INJECTION, SOLUTION EPIDURAL; INFILTRATION CONTINUOUS PRN
Status: DISCONTINUED | OUTPATIENT
Start: 2025-08-04 | End: 2025-08-04

## 2025-08-04 RX ORDER — DIPHENOXYLATE HYDROCHLORIDE AND ATROPINE SULFATE 2.5; .025 MG/1; MG/1
2 TABLET ORAL EVERY 6 HOURS PRN
Status: DISCONTINUED | OUTPATIENT
Start: 2025-08-04 | End: 2025-08-04

## 2025-08-04 RX ORDER — OXYTOCIN-SODIUM CHLORIDE 0.9% IV SOLN 30 UNIT/500ML 30-0.9/5 UT/ML-%
10 SOLUTION INTRAVENOUS ONCE AS NEEDED
Status: DISCONTINUED | OUTPATIENT
Start: 2025-08-04 | End: 2025-08-04

## 2025-08-04 RX ORDER — OXYTOCIN-SODIUM CHLORIDE 0.9% IV SOLN 30 UNIT/500ML 30-0.9/5 UT/ML-%
0-32 SOLUTION INTRAVENOUS CONTINUOUS
Status: DISCONTINUED | OUTPATIENT
Start: 2025-08-04 | End: 2025-08-04

## 2025-08-04 RX ORDER — OXYTOCIN-SODIUM CHLORIDE 0.9% IV SOLN 30 UNIT/500ML 30-0.9/5 UT/ML-%
10 SOLUTION INTRAVENOUS ONCE AS NEEDED
Status: DISCONTINUED | OUTPATIENT
Start: 2025-08-04 | End: 2025-08-06 | Stop reason: HOSPADM

## 2025-08-04 RX ORDER — SODIUM CHLORIDE, SODIUM LACTATE, POTASSIUM CHLORIDE, CALCIUM CHLORIDE 600; 310; 30; 20 MG/100ML; MG/100ML; MG/100ML; MG/100ML
INJECTION, SOLUTION INTRAVENOUS CONTINUOUS
Status: DISCONTINUED | OUTPATIENT
Start: 2025-08-04 | End: 2025-08-04

## 2025-08-04 RX ORDER — OXYTOCIN-SODIUM CHLORIDE 0.9% IV SOLN 30 UNIT/500ML 30-0.9/5 UT/ML-%
95 SOLUTION INTRAVENOUS CONTINUOUS PRN
Status: DISCONTINUED | OUTPATIENT
Start: 2025-08-04 | End: 2025-08-06 | Stop reason: HOSPADM

## 2025-08-04 RX ORDER — SIMETHICONE 80 MG
1 TABLET,CHEWABLE ORAL EVERY 6 HOURS PRN
Status: DISCONTINUED | OUTPATIENT
Start: 2025-08-04 | End: 2025-08-06 | Stop reason: HOSPADM

## 2025-08-04 RX ORDER — HYDROCODONE BITARTRATE AND ACETAMINOPHEN 5; 325 MG/1; MG/1
1 TABLET ORAL EVERY 4 HOURS PRN
Status: DISCONTINUED | OUTPATIENT
Start: 2025-08-04 | End: 2025-08-06 | Stop reason: HOSPADM

## 2025-08-04 RX ORDER — TRANEXAMIC ACID 10 MG/ML
1000 INJECTION, SOLUTION INTRAVENOUS EVERY 30 MIN PRN
Status: DISCONTINUED | OUTPATIENT
Start: 2025-08-04 | End: 2025-08-04

## 2025-08-04 RX ORDER — MISOPROSTOL 200 UG/1
TABLET ORAL
Status: DISCONTINUED
Start: 2025-08-04 | End: 2025-08-04 | Stop reason: WASHOUT

## 2025-08-04 RX ORDER — DIPHENOXYLATE HYDROCHLORIDE AND ATROPINE SULFATE 2.5; .025 MG/1; MG/1
2 TABLET ORAL EVERY 6 HOURS PRN
Status: DISCONTINUED | OUTPATIENT
Start: 2025-08-04 | End: 2025-08-06 | Stop reason: HOSPADM

## 2025-08-04 RX ORDER — METHYLERGONOVINE MALEATE 0.2 MG/ML
200 INJECTION INTRAVENOUS ONCE AS NEEDED
Status: DISCONTINUED | OUTPATIENT
Start: 2025-08-04 | End: 2025-08-04

## 2025-08-04 RX ORDER — SODIUM CHLORIDE 9 MG/ML
INJECTION, SOLUTION INTRAVENOUS
Status: DISCONTINUED | OUTPATIENT
Start: 2025-08-04 | End: 2025-08-04

## 2025-08-04 RX ORDER — OXYTOCIN 10 [USP'U]/ML
10 INJECTION, SOLUTION INTRAMUSCULAR; INTRAVENOUS ONCE AS NEEDED
Status: DISCONTINUED | OUTPATIENT
Start: 2025-08-04 | End: 2025-08-06 | Stop reason: HOSPADM

## 2025-08-04 RX ORDER — MISOPROSTOL 200 UG/1
800 TABLET ORAL ONCE AS NEEDED
Status: DISCONTINUED | OUTPATIENT
Start: 2025-08-04 | End: 2025-08-06 | Stop reason: HOSPADM

## 2025-08-04 RX ORDER — METRONIDAZOLE 500 MG/1
500 TABLET ORAL ONCE
Status: COMPLETED | OUTPATIENT
Start: 2025-08-04 | End: 2025-08-04

## 2025-08-04 RX ORDER — METHYLERGONOVINE MALEATE 0.2 MG/ML
200 INJECTION INTRAVENOUS ONCE AS NEEDED
Status: DISCONTINUED | OUTPATIENT
Start: 2025-08-04 | End: 2025-08-06 | Stop reason: HOSPADM

## 2025-08-04 RX ORDER — DIPHENHYDRAMINE HCL 25 MG
25 CAPSULE ORAL EVERY 4 HOURS PRN
Status: DISCONTINUED | OUTPATIENT
Start: 2025-08-04 | End: 2025-08-06 | Stop reason: HOSPADM

## 2025-08-04 RX ORDER — ONDANSETRON 8 MG/1
8 TABLET, ORALLY DISINTEGRATING ORAL EVERY 8 HOURS PRN
Status: DISCONTINUED | OUTPATIENT
Start: 2025-08-04 | End: 2025-08-06 | Stop reason: HOSPADM

## 2025-08-04 RX ORDER — DIPHENHYDRAMINE HYDROCHLORIDE 50 MG/ML
25 INJECTION, SOLUTION INTRAMUSCULAR; INTRAVENOUS EVERY 4 HOURS PRN
Status: DISCONTINUED | OUTPATIENT
Start: 2025-08-04 | End: 2025-08-06 | Stop reason: HOSPADM

## 2025-08-04 RX ORDER — CALCIUM CARBONATE 200(500)MG
500 TABLET,CHEWABLE ORAL 3 TIMES DAILY PRN
Status: DISCONTINUED | OUTPATIENT
Start: 2025-08-04 | End: 2025-08-04

## 2025-08-04 RX ORDER — OXYTOCIN-SODIUM CHLORIDE 0.9% IV SOLN 30 UNIT/500ML 30-0.9/5 UT/ML-%
95 SOLUTION INTRAVENOUS ONCE AS NEEDED
Status: DISCONTINUED | OUTPATIENT
Start: 2025-08-04 | End: 2025-08-06 | Stop reason: HOSPADM

## 2025-08-04 RX ORDER — CARBOPROST TROMETHAMINE 250 UG/ML
250 INJECTION, SOLUTION INTRAMUSCULAR
Status: DISCONTINUED | OUTPATIENT
Start: 2025-08-04 | End: 2025-08-06 | Stop reason: HOSPADM

## 2025-08-04 RX ORDER — FENTANYL CITRATE 50 UG/ML
INJECTION, SOLUTION INTRAMUSCULAR; INTRAVENOUS
Status: DISCONTINUED | OUTPATIENT
Start: 2025-08-04 | End: 2025-08-04

## 2025-08-04 RX ORDER — TRANEXAMIC ACID 10 MG/ML
1000 INJECTION, SOLUTION INTRAVENOUS EVERY 30 MIN PRN
Status: DISCONTINUED | OUTPATIENT
Start: 2025-08-04 | End: 2025-08-06 | Stop reason: HOSPADM

## 2025-08-04 RX ORDER — PRENATAL WITH FERROUS FUM AND FOLIC ACID 3080; 920; 120; 400; 22; 1.84; 3; 20; 10; 1; 12; 200; 27; 25; 2 [IU]/1; [IU]/1; MG/1; [IU]/1; MG/1; MG/1; MG/1; MG/1; MG/1; MG/1; UG/1; MG/1; MG/1; MG/1; MG/1
1 TABLET ORAL DAILY
Status: DISCONTINUED | OUTPATIENT
Start: 2025-08-05 | End: 2025-08-06 | Stop reason: HOSPADM

## 2025-08-04 RX ORDER — DOCUSATE SODIUM 100 MG/1
200 CAPSULE, LIQUID FILLED ORAL 2 TIMES DAILY PRN
Status: DISCONTINUED | OUTPATIENT
Start: 2025-08-04 | End: 2025-08-06 | Stop reason: HOSPADM

## 2025-08-04 RX ORDER — HYDROCODONE BITARTRATE AND ACETAMINOPHEN 500; 5 MG/1; MG/1
TABLET ORAL
Status: DISCONTINUED | OUTPATIENT
Start: 2025-08-04 | End: 2025-08-04

## 2025-08-04 RX ORDER — OXYTOCIN-SODIUM CHLORIDE 0.9% IV SOLN 30 UNIT/500ML 30-0.9/5 UT/ML-%
95 SOLUTION INTRAVENOUS CONTINUOUS PRN
Status: DISCONTINUED | OUTPATIENT
Start: 2025-08-04 | End: 2025-08-04

## 2025-08-04 RX ORDER — OXYTOCIN-SODIUM CHLORIDE 0.9% IV SOLN 30 UNIT/500ML 30-0.9/5 UT/ML-%
30 SOLUTION INTRAVENOUS ONCE AS NEEDED
Status: DISCONTINUED | OUTPATIENT
Start: 2025-08-04 | End: 2025-08-04

## 2025-08-04 RX ORDER — IBUPROFEN 800 MG/1
800 TABLET, FILM COATED ORAL EVERY 8 HOURS
Status: DISCONTINUED | OUTPATIENT
Start: 2025-08-05 | End: 2025-08-06 | Stop reason: HOSPADM

## 2025-08-04 RX ADMIN — FENTANYL CITRATE 200 MCG: 50 INJECTION, SOLUTION INTRAMUSCULAR; INTRAVENOUS at 03:08

## 2025-08-04 RX ADMIN — ROPIVACAINE HYDROCHLORIDE 12 ML/HR: 2 INJECTION, SOLUTION EPIDURAL; INFILTRATION at 03:08

## 2025-08-04 RX ADMIN — Medication 95 MILLI-UNITS/MIN: at 09:08

## 2025-08-04 RX ADMIN — METRONIDAZOLE 500 MG: 500 TABLET ORAL at 08:08

## 2025-08-04 RX ADMIN — KETOROLAC TROMETHAMINE 30 MG: 30 INJECTION, SOLUTION INTRAMUSCULAR at 11:08

## 2025-08-04 RX ADMIN — Medication 4 MILLI-UNITS/MIN: at 05:08

## 2025-08-04 RX ADMIN — SODIUM CHLORIDE, POTASSIUM CHLORIDE, SODIUM LACTATE AND CALCIUM CHLORIDE: 600; 310; 30; 20 INJECTION, SOLUTION INTRAVENOUS at 05:08

## 2025-08-04 NOTE — NURSING
Dr. Desir called with report upon patient's vaginal exam, a yellowish/greenish vaginal discharge noted on exam glove. New orders noted to obtain a vaginal swab, order verified.

## 2025-08-04 NOTE — NURSING
Vaginal swab obtained, sent to lab. Teaching ongoing r/t the procedure and reasoning for treatment. Patient tolerated procedure well.

## 2025-08-04 NOTE — ANESTHESIA PROCEDURE NOTES
Epidural    Patient location during procedure: OB   Reason for block: primary anesthetic   Reason for block: labor analgesia requested by patient and obstetrician  Diagnosis: pregnancy   Start time: 8/4/2025 3:46 PM  Timeout: 8/4/2025 3:45 PM  End time: 8/4/2025 3:49 PM  Surgery related to: pregnancy    Staffing  Performing Provider: Darrin Trujillo CRNA  Authorizing Provider: Darrin Trujillo CRNA    Staffing  Performed by: Darrin Trujillo, ZOEY  Authorized by: Darrin Trujillo CRNA        Preanesthetic Checklist  Completed: patient identified, IV checked, site marked, risks and benefits discussed, surgical consent, monitors and equipment checked, pre-op evaluation, timeout performed, anesthesia consent given, hand hygiene performed and patient being monitored  Preparation  Patient position: sitting  Prep: ChloraPrep  Patient monitoring: Pulse Ox and Blood Pressure  Reason for block: primary anesthetic   Epidural  Skin Anesthetic: lidocaine 1%  Skin Wheal: 3 mL  Administration type: single shot  Approach: midline  Interspace: L2-3    Injection technique: LIVIER saline  Needle and Epidural Catheter  Needle type: Tuohy   Needle gauge: 18  Needle length: 3.5 inches  Needle insertion depth: 7 cm  Catheter type: multi-orifice  Catheter size: 20 G  Catheter at skin depth: 13 cm  Insertion Attempts: 1  Test dose: 5 mL of lidocaine 1.5% with Epi 1-to-200,000  Additional Documentation: incremental injection, negative aspiration for heme and CSF, no paresthesia on injection, no signs/symptoms of IV or SA injection, no significant complaints from patient and no significant pain on injection  Needle localization: anatomical landmarks  Assessment  Upper dermatomal levels - Left: T10  Right: T10   Dermatomal levels determined by pinch or prick  Ease of block: easy  Patient's tolerance of the procedure: comfortable throughout block and no complaints No inadvertent dural puncture with Tuohy.  Dural puncture performed with spinal  needle.

## 2025-08-04 NOTE — LETTER
August 4, 2025         [address] [facility] [address]  Phone: [phone]  Fax: [phone]       Patient: Boni Duarte   YOB: 1990  Date of Visit: 08/04/2025    To Whom It May Concern:    Sara Duarte  was at [facility] on 08/04/2025. The patient may return to work/school on *** {With/no:34642} restrictions. If you have any questions or concerns, or if I can be of further assistance, please do not hesitate to contact me.    Sincerely,    Christian Espitia RN

## 2025-08-04 NOTE — ANESTHESIA PREPROCEDURE EVALUATION
08/04/2025  Boni Duarte is a 34 y.o., female.      Pre-op Assessment    I have reviewed the Patient Summary Reports.     I have reviewed the Nursing Notes.    I have reviewed the Medications.     Review of Systems  Anesthesia Hx:  No problems with previous Anesthesia                Social:  Non-Smoker, No Alcohol Use       Hematology/Oncology:    Oncology Normal                Hematology Comments: Sickle Cell          --  Plasma Cell Disorder:            EENT/Dental:  EENT/Dental Normal           Cardiovascular:  Cardiovascular Normal Exercise tolerance: good                                             Pulmonary:  Pulmonary Normal                       Renal/:  Renal/ Normal                 Hepatic/GI:  Hepatic/GI Normal                    Musculoskeletal:  Musculoskeletal Normal                Neurological:  Neurology Normal                                      Endocrine:  Endocrine Normal            Dermatological:  Skin Normal    Psych:  Psychiatric Normal                     Anesthesia Plan  Type of Anesthesia, risks & benefits discussed:    Anesthesia Type: Epidural  Intra-op Monitoring Plan: Standard ASA Monitors  Post Op Pain Control Plan: multimodal analgesia  Induction:  IV  ASA Score: 3  Day of Surgery Review of History & Physical: H&P Update referred to the surgeon/provider.I have interviewed and examined the patient. I have reviewed the patient's H&P dated: 8/4/25. There are no significant changes.     Ready For Surgery From Anesthesia Perspective.     .

## 2025-08-05 LAB
ABSOLUTE NEUTROPHIL MANUAL (OHS): 16.2 K/UL (ref 1.8–7.7)
ANISOCYTOSIS BLD QL SMEAR: ABNORMAL
ERYTHROCYTE [DISTWIDTH] IN BLOOD BY AUTOMATED COUNT: 20.3 % (ref 11.5–14.5)
HCT VFR BLD AUTO: 18.7 % (ref 37–48.5)
HGB BLD-MCNC: 7.2 GM/DL (ref 12–16)
HYPOCHROMIA BLD QL SMEAR: ABNORMAL
LARGE/GIANT PLATELETS (OHS): PRESENT
LYMPHOCYTES NFR BLD MANUAL: 10 % (ref 18–48)
MCH RBC QN AUTO: 32.7 PG (ref 27–31)
MCHC RBC AUTO-ENTMCNC: 38.5 G/DL (ref 32–36)
MCV RBC AUTO: 85 FL (ref 82–98)
MONOCYTES NFR BLD MANUAL: 11 % (ref 4–15)
NEUTROPHILS NFR BLD MANUAL: 78 % (ref 38–73)
NEUTS BAND NFR BLD MANUAL: 1 %
NUCLEATED RBC (/100WBC) (OHS): 8 /100 WBC
OVALOCYTES BLD QL SMEAR: ABNORMAL
PLATELET # BLD AUTO: 287 K/UL (ref 150–450)
PLATELET BLD QL SMEAR: ABNORMAL
PMV BLD AUTO: 10.4 FL (ref 9.2–12.9)
POIKILOCYTOSIS BLD QL SMEAR: ABNORMAL
POLYCHROMASIA BLD QL SMEAR: ABNORMAL
RBC # BLD AUTO: 2.2 M/UL (ref 4–5.4)
SICKLE CELLS BLD QL SMEAR: ABNORMAL
SMUDGE CELLS BLD QL SMEAR: PRESENT
TARGETS BLD QL SMEAR: ABNORMAL
WBC # BLD AUTO: 20.54 K/UL (ref 3.9–12.7)

## 2025-08-05 PROCEDURE — 11000001 HC ACUTE MED/SURG PRIVATE ROOM

## 2025-08-05 PROCEDURE — 36415 COLL VENOUS BLD VENIPUNCTURE: CPT | Performed by: OBSTETRICS & GYNECOLOGY

## 2025-08-05 PROCEDURE — 85007 BL SMEAR W/DIFF WBC COUNT: CPT | Performed by: OBSTETRICS & GYNECOLOGY

## 2025-08-05 PROCEDURE — 63600175 PHARM REV CODE 636 W HCPCS: Mod: JZ,TB | Performed by: OBSTETRICS & GYNECOLOGY

## 2025-08-05 PROCEDURE — 25000003 PHARM REV CODE 250: Performed by: OBSTETRICS & GYNECOLOGY

## 2025-08-05 RX ADMIN — HYDROCODONE BITARTRATE AND ACETAMINOPHEN 1 TABLET: 5; 325 TABLET ORAL at 12:08

## 2025-08-05 RX ADMIN — PRENATAL VIT W/ FE FUMARATE-FA TAB 27-0.8 MG 1 TABLET: 27-0.8 TAB at 10:08

## 2025-08-05 RX ADMIN — KETOROLAC TROMETHAMINE 30 MG: 30 INJECTION, SOLUTION INTRAMUSCULAR at 06:08

## 2025-08-05 NOTE — PLAN OF CARE
Patient stable, vital signs remain WNL. Patient preforming self tasha-care. Fundus firm, at umbilicus with scant to light vaginal bleeding. Voiding spontaneously. Ambulates around room well and independently. Patient has showered. Abdominal cramping and pain well controlled.  IV remains dry and intact. Patient attentive and appropriate with infant. Questions/Concerns answered. Mother verbalizes understanding.

## 2025-08-05 NOTE — HOSPITAL COURSE
HD#1 Admit for elective IOL. Patient with sickle cell disease. FAVD.   HD#2 Routine PP care; patient with asymptomatic anemia. Will monitor.   HD#3 Routine pp care. Stable anemia; remains asymptomatic.  Discharge home.    Patient experiencing increase in pain. Wants to know if he can reschedule 4/22 infusion for sooner date.

## 2025-08-05 NOTE — SUBJECTIVE & OBJECTIVE
Interval History:     She is doing well this morning. She is tolerating a regular diet without nausea or vomiting. She is voiding spontaneously. She is ambulating. She has passed flatus, and has not a BM. Vaginal bleeding is mild. She denies fever or chills. Abdominal pain is mild and controlled with oral medications. She Is breastfeeding. She desires circumcision for her male baby: not applicable.    Objective:     Vital Signs (Most Recent):  Temp: 96.6 °F (35.9 °C) (08/05/25 0407)  Pulse: 100 (08/05/25 0407)  Resp: 18 (08/05/25 0407)  BP: (!) 126/58 (08/05/25 0407)  SpO2: (!) 84 % (08/05/25 0407) Vital Signs (24h Range):  Temp:  [96.1 °F (35.6 °C)-97.7 °F (36.5 °C)] 96.6 °F (35.9 °C)  Pulse:  [] 100  Resp:  [18-20] 18  SpO2:  [84 %-99 %] 84 %  BP: ()/(50-82) 126/58     Weight: 89.8 kg (198 lb)  Body mass index is 31.01 kg/m².      Intake/Output Summary (Last 24 hours) at 8/5/2025 0818  Last data filed at 8/5/2025 0032  Gross per 24 hour   Intake 2580.24 ml   Output 750 ml   Net 1830.24 ml         Significant Labs:  Lab Results   Component Value Date    GROUPH B POS 08/04/2025    HEPBSAG Non-reactive 02/17/2025     Recent Labs   Lab 08/05/25  0619   HGB 7.2*   HCT 18.7*       I have personallly reviewed all pertinent lab results from the last 24 hours.    Physical Exam:   Constitutional: She is oriented to person, place, and time. She appears well-developed and well-nourished.    HENT:   Head: Normocephalic and atraumatic.    Eyes: Pupils are equal, round, and reactive to light. EOM are normal.     Cardiovascular:  Normal rate and regular rhythm.             Pulmonary/Chest: Effort normal and breath sounds normal.        Abdominal: Soft. Bowel sounds are normal.     Genitourinary:    Vagina and uterus normal.             Musculoskeletal: Normal range of motion and moves all extremeties.       Neurological: She is alert and oriented to person, place, and time.    Skin: Skin is warm and dry.     Psychiatric: She has a normal mood and affect.       Review of Systems   Constitutional:  Negative for activity change, appetite change, chills, diaphoresis, fatigue, fever and unexpected weight change.   HENT:  Negative for mouth sores and tinnitus.    Eyes:  Negative for discharge and visual disturbance.   Respiratory:  Negative for cough, shortness of breath and wheezing.    Cardiovascular:  Negative for chest pain, palpitations and leg swelling.   Gastrointestinal:  Positive for abdominal pain. Negative for bloating, blood in stool, constipation, diarrhea, nausea and vomiting.   Endocrine: Negative for diabetes, hair loss, hot flashes, hyperthyroidism and hypothyroidism.   Genitourinary:  Negative for dysuria, flank pain, frequency, genital sores, hematuria, urgency, vaginal bleeding, vaginal discharge, vaginal pain, postcoital bleeding and vaginal odor.   Musculoskeletal:  Negative for back pain, joint swelling and myalgias.   Integumentary:  Negative for rash, acne, breast mass, nipple discharge and breast skin changes.   Neurological:  Negative for seizures, syncope, numbness and headaches.   Hematological:  Negative for adenopathy. Does not bruise/bleed easily.   Psychiatric/Behavioral:  Negative for depression and sleep disturbance. The patient is not nervous/anxious.    Breast: Negative for mass, mastodynia, nipple discharge and skin changes

## 2025-08-05 NOTE — L&D DELIVERY NOTE
[facility] - Labor & Delivery  Vaginal Delivery   Operative Note    SUMMARY     Forceps assisted vaginal delivery of live infant, was placed on mothers abdomen for skin to skin and bulb suctioning performed.  Infant delivered position OP over intact perineum.  Nuchal cord: No.  Forceps delivery performed secondary to persistent late decelerations with each contraction and.  Failure to have significant descent with maternal effort  Spontaneous delivery of placenta and IV pitocin given noting good uterine tone.  No lacerations noted.  Patient tolerated delivery well. Sponge needle and lap counted correctly x2.    Indications: Encounter for elective induction of labor  Pregnancy complicated by: Problem List[1]  Admitting GA: 39w1d    Delivery Information for Gamaliel Duarte    Birth information:  YOB: 2023   Time of birth: 9:10 PM   Sex: female   Head Delivery Date/Time: 2025  9:10 PM   Delivery type: Vaginal, Forceps   Gestational Age: 39w1d       Delivery Providers    Delivering clinician: Maximilian Desir MD   Provider Role    , JF Martínez Kayla, RN Verdin, Ashley, [place], Judy P, LPN               Measurements    Weight: 3260 g  Weight (lbs): 7 lb 3 oz  Length:          Apgars    Living status: Living  Apgar Component Scores:  1 min.:  5 min.:  10 min.:  15 min.:  20 min.:    Skin color:  0  1       Heart rate:  2  2       Reflex irritability:  2  2       Muscle tone:  2  2       Respiratory effort:  2  2       Total:  8  9       Apgars assigned by: SOFI BLANCHARD LPN         Operative Delivery    Forceps attempted?: Yes  Forceps indications: Maternal Fatigue, Fetal Heart Rate or Rhythm Abnormality  Forceps type: Murphy-Duncan  Forceps application location: Outlet  Number of pulls with forceps: 1  Total forceps application time: 30 seconds  Forceps applied by: DR. DESIR  Failed forceps delivery?: No  Vacuum extractor attempted?: No         Shoulder  Dystocia    Shoulder dystocia present?: No           Presentation    Presentation: Vertex  Position: Middle Occiput Posterior           Interventions/Resuscitation    Method: Bulb Suctioning, Tactile Stimulation       Cord    Vessels: 3 vessels  Complications: None  Delayed Cord Clamping?: Yes  Gases Sent?: No  Stem Cell Collection (by MD): No       Placenta    Placenta delivery date/time: 2025 21:10  Placenta removal: Spontaneous  Placenta appearance: Intact  Placenta disposition: Donation           Labor Events:       labor: No     Labor Onset Date/Time:         Dilation Complete Date/Time: 2025 16:50     Start Pushing Date/Time: 2025 20:10       Start Pushing Date/Time: 2025 20:10     Rupture Date/Time: 25 1324        Rupture type: ARM (Artificial Rupture)        Fluid Amount:       Fluid Color: Clear              steroids: None     Antibiotics given for GBS: No     Induction: oxytocin     Indications for induction:        Augmentation:       Indications for augmentation:       Labor complications: None     Additional complications:          Cervical ripening:                     Delivery:      Episiotomy: None     Indication for Episiotomy:       Perineal Lacerations:   Repaired:      Periurethral Laceration:   Repaired:     Labial Laceration:   Repaired:     Sulcus Laceration:   Repaired:     Vaginal Laceration:   Repaired:     Cervical Laceration:   Repaired:     Repair suture:       Repair # of packets:       Last Value - EBL - Nursing (mL):       Sum - EBL - Nursing (mL): 0     Last Value - EBL - Anesthesia (mL):      Calculated QBL (mL): 50     Running total QBL (mL): 50     Vaginal Sweep Performed: Yes     Surgicount Correct: Yes     Vaginal Packing: No Quantity:       Other providers:       Anesthesia    Method: Epidural          Details (if applicable):  Trial of Labor      Categorization:      Priority:     Indications for :      Incision Type:       Additional  information:  Forceps:    Vacuum:    Breech:    Observed anomalies    Other (Comments):              [1]   Patient Active Problem List  Diagnosis    - Maternal sickle cell anemia affecting pregnancy, antepartum    Hyperbilirubinemia    Sickle cell pain crisis    Acute chest syndrome    Iron overload    Encounter for monitoring of hydroxyurea therapy    High-risk pregnancy in third trimester    Vaginal bleeding    Alteration in comfort associated with uterine contractions    Bartholin cyst, Right sided, s/p I&D 7/2022 and previously in 2020    Vaginal bleeding in pregnancy, third trimester    Encounter for elective induction of labor    Forceps delivery with baby delivered

## 2025-08-05 NOTE — PLAN OF CARE
[facility] - Labor & Delivery  Discharge Assessment    Primary Care Provider: Cal Clay MD     OB Screen (most recent)       OB Screen - 25 [id number]          OB SCREEN    Assessment Type Discharge Planning Brief Assessment     Source of Information health record     Received Prenatal Care Yes     Any indications/suspicions for None     Is this a teen pregnancy No     Is the baby in NICU No     Indication for adoption/Safe Haven No     Indication for DME/post-acute needs No     HIV (+) No     Any congenital  disorders No     Fetal demise/ death No                 Case management completed brief assessment via chart review - no case management needs identified at this time. Case management to remain available for any future needs.

## 2025-08-05 NOTE — PLAN OF CARE
Problem: Adult Inpatient Plan of Care  Goal: Plan of Care Review  Outcome: Progressing  Goal: Patient-Specific Goal (Individualized)  Outcome: Progressing  Goal: Absence of Hospital-Acquired Illness or Injury  Outcome: Progressing  Goal: Optimal Comfort and Wellbeing  Outcome: Progressing  Goal: Readiness for Transition of Care  Outcome: Progressing     Problem:  Fall Injury Risk  Goal: Absence of Fall, Infant Drop and Related Injury  Outcome: Progressing     Problem: Infection  Goal: Absence of Infection Signs and Symptoms  Outcome: Progressing     Problem: Pain Acute  Goal: Optimal Pain Control and Function  Outcome: Progressing     Problem: Postpartum (Vaginal Delivery)  Goal: Successful Parent Role Transition  Outcome: Progressing  Goal: Hemostasis  Outcome: Progressing  Goal: Absence of Infection Signs and Symptoms  Outcome: Progressing  Goal: Anesthesia/Sedation Recovery  Outcome: Progressing  Goal: Optimal Pain Control and Function  Outcome: Progressing  Goal: Effective Urinary Elimination  Outcome: Progressing

## 2025-08-05 NOTE — ANESTHESIA POSTPROCEDURE EVALUATION
Anesthesia Post Evaluation    Patient: Boni Duarte    Procedure(s) Performed: * No procedures listed *    Final Anesthesia Type: epidural      Patient location during evaluation: labor & delivery  Patient participation: Yes- Able to Participate  Level of consciousness: awake and alert and oriented  Post-procedure vital signs: reviewed and stable  Pain management: adequate  Airway patency: patent    PONV status at discharge: No PONV  Anesthetic complications: no      Cardiovascular status: blood pressure returned to baseline  Respiratory status: unassisted, spontaneous ventilation and room air  Hydration status: euvolemic  Follow-up not needed.              Vitals Value Taken Time   /70 08/04/25 20:47   Temp 35.6 °C (96.1 °F) 08/04/25 19:01   Pulse 116 08/04/25 21:12   Resp 20 08/04/25 19:01   SpO2 99 % 08/04/25 10:18   Vitals shown include unfiled device data.      No case tracking events are documented in the log.      Pain/Mireya Score: No data recorded

## 2025-08-05 NOTE — PROGRESS NOTES
[facility] - Labor & Delivery  Obstetrics  Postpartum Progress Note    Patient Name: Boni Duarte  MRN: [id number]  Admission Date: 8/4/2025  Hospital Length of Stay: 1 days  Attending Physician: Maximilian Desir MD  Primary Care Provider: Cal Clay MD    Subjective:     Principal Problem:Encounter for elective induction of labor    Hospital Course:  HD#1 Admit for elective IOL. Patient with sickle cell disease. FAVD.     HD#2 Routine PP care; patient with asymptomatic anemia. Will monitor.     Interval History:     She is doing well this morning. She is tolerating a regular diet without nausea or vomiting. She is voiding spontaneously. She is ambulating. She has passed flatus, and has not a BM. Vaginal bleeding is mild. She denies fever or chills. Abdominal pain is mild and controlled with oral medications. She Is breastfeeding. She desires circumcision for her male baby: not applicable.    Objective:     Vital Signs (Most Recent):  Temp: 96.6 °F (35.9 °C) (08/05/25 0407)  Pulse: 100 (08/05/25 0407)  Resp: 18 (08/05/25 0407)  BP: (!) 126/58 (08/05/25 0407)  SpO2: (!) 84 % (08/05/25 0407) Vital Signs (24h Range):  Temp:  [96.1 °F (35.6 °C)-97.7 °F (36.5 °C)] 96.6 °F (35.9 °C)  Pulse:  [] 100  Resp:  [18-20] 18  SpO2:  [84 %-99 %] 84 %  BP: ()/(50-82) 126/58     Weight: 89.8 kg (198 lb)  Body mass index is 31.01 kg/m².      Intake/Output Summary (Last 24 hours) at 8/5/2025 0818  Last data filed at 8/5/2025 0032  Gross per 24 hour   Intake 2580.24 ml   Output 750 ml   Net 1830.24 ml         Significant Labs:  Lab Results   Component Value Date    GROUPTRH B POS 08/04/2025    HEPBSAG Non-reactive 02/17/2025     Recent Labs   Lab 08/05/25  0619   HGB 7.2*   HCT 18.7*       I have personallly reviewed all pertinent lab results from the last 24 hours.    Physical Exam:   Constitutional: She is oriented to person, place, and time. She appears well-developed and well-nourished.    HENT:   Head:  Normocephalic and atraumatic.    Eyes: Pupils are equal, round, and reactive to light. EOM are normal.     Cardiovascular:  Normal rate and regular rhythm.             Pulmonary/Chest: Effort normal and breath sounds normal.        Abdominal: Soft. Bowel sounds are normal.     Genitourinary:    Vagina and uterus normal.             Musculoskeletal: Normal range of motion and moves all extremeties.       Neurological: She is alert and oriented to person, place, and time.    Skin: Skin is warm and dry.    Psychiatric: She has a normal mood and affect.       Review of Systems   Constitutional:  Negative for activity change, appetite change, chills, diaphoresis, fatigue, fever and unexpected weight change.   HENT:  Negative for mouth sores and tinnitus.    Eyes:  Negative for discharge and visual disturbance.   Respiratory:  Negative for cough, shortness of breath and wheezing.    Cardiovascular:  Negative for chest pain, palpitations and leg swelling.   Gastrointestinal:  Positive for abdominal pain. Negative for bloating, blood in stool, constipation, diarrhea, nausea and vomiting.   Endocrine: Negative for diabetes, hair loss, hot flashes, hyperthyroidism and hypothyroidism.   Genitourinary:  Negative for dysuria, flank pain, frequency, genital sores, hematuria, urgency, vaginal bleeding, vaginal discharge, vaginal pain, postcoital bleeding and vaginal odor.   Musculoskeletal:  Negative for back pain, joint swelling and myalgias.   Integumentary:  Negative for rash, acne, breast mass, nipple discharge and breast skin changes.   Neurological:  Negative for seizures, syncope, numbness and headaches.   Hematological:  Negative for adenopathy. Does not bruise/bleed easily.   Psychiatric/Behavioral:  Negative for depression and sleep disturbance. The patient is not nervous/anxious.    Breast: Negative for mass, mastodynia, nipple discharge and skin changes    Assessment/Plan:     34 y.o. female  for:    Forceps  delivery with baby delivered  Routine PP care    - Maternal sickle cell anemia affecting pregnancy, antepartum  Noted; will monitor         Disposition: As patient meets milestones, will plan to discharge 1-2 days.    Annmarie Mendiola MD  Obstetrics  [place] [facility] - Labor & Delivery

## 2025-08-06 VITALS
TEMPERATURE: 98 F | HEART RATE: 93 BPM | BODY MASS INDEX: 31.08 KG/M2 | OXYGEN SATURATION: 86 % | SYSTOLIC BLOOD PRESSURE: 112 MMHG | HEIGHT: 67 IN | RESPIRATION RATE: 20 BRPM | WEIGHT: 198 LBS | DIASTOLIC BLOOD PRESSURE: 67 MMHG

## 2025-08-06 LAB
ABSOLUTE EOSINOPHIL (OHS): 0.14 K/UL
ABSOLUTE MONOCYTE (OHS): 2.34 K/UL (ref 0.3–1)
ABSOLUTE NEUTROPHIL COUNT (OHS): 17.84 K/UL (ref 1.8–7.7)
ANISOCYTOSIS BLD QL SMEAR: NORMAL
BASOPHILS # BLD AUTO: 0.06 K/UL
BASOPHILS NFR BLD AUTO: 0.3 %
ERYTHROCYTE [DISTWIDTH] IN BLOOD BY AUTOMATED COUNT: 20.3 % (ref 11.5–14.5)
HCT VFR BLD AUTO: 20.4 % (ref 37–48.5)
HGB BLD-MCNC: 7.5 GM/DL (ref 12–16)
IMM GRANULOCYTES # BLD AUTO: 0.38 K/UL (ref 0–0.04)
IMM GRANULOCYTES NFR BLD AUTO: 1.7 % (ref 0–0.5)
LYMPHOCYTES # BLD AUTO: 2.07 K/UL (ref 1–4.8)
MCH RBC QN AUTO: 31.5 PG (ref 27–31)
MCHC RBC AUTO-ENTMCNC: 36.8 G/DL (ref 32–36)
MCV RBC AUTO: 86 FL (ref 82–98)
NUCLEATED RBC (/100WBC) (OHS): 9 /100 WBC
PLATELET # BLD AUTO: 343 K/UL (ref 150–450)
PMV BLD AUTO: 10.4 FL (ref 9.2–12.9)
POIKILOCYTOSIS BLD QL SMEAR: NORMAL
POLYCHROMASIA BLD QL SMEAR: NORMAL
RBC # BLD AUTO: 2.38 M/UL (ref 4–5.4)
RELATIVE EOSINOPHIL (OHS): 0.6 %
RELATIVE LYMPHOCYTE (OHS): 9.1 % (ref 18–48)
RELATIVE MONOCYTE (OHS): 10.2 % (ref 4–15)
RELATIVE NEUTROPHIL (OHS): 78.1 % (ref 38–73)
SICKLE CELLS BLD QL SMEAR: NORMAL
SPHEROCYTES BLD QL SMEAR: NORMAL
WBC # BLD AUTO: 22.83 K/UL (ref 3.9–12.7)

## 2025-08-06 PROCEDURE — 99238 HOSP IP/OBS DSCHRG MGMT 30/<: CPT | Mod: ,,, | Performed by: OBSTETRICS & GYNECOLOGY

## 2025-08-06 PROCEDURE — 85025 COMPLETE CBC W/AUTO DIFF WBC: CPT | Performed by: OBSTETRICS & GYNECOLOGY

## 2025-08-06 PROCEDURE — 36415 COLL VENOUS BLD VENIPUNCTURE: CPT | Performed by: OBSTETRICS & GYNECOLOGY

## 2025-08-06 PROCEDURE — 25000003 PHARM REV CODE 250: Performed by: OBSTETRICS & GYNECOLOGY

## 2025-08-06 RX ORDER — IBUPROFEN 800 MG/1
800 TABLET, FILM COATED ORAL EVERY 8 HOURS
Qty: 30 TABLET | Refills: 0 | Status: SHIPPED | OUTPATIENT
Start: 2025-08-06

## 2025-08-06 RX ADMIN — DOCUSATE SODIUM 200 MG: 100 CAPSULE, LIQUID FILLED ORAL at 09:08

## 2025-08-06 RX ADMIN — PRENATAL VIT W/ FE FUMARATE-FA TAB 27-0.8 MG 1 TABLET: 27-0.8 TAB at 09:08

## 2025-08-06 NOTE — PLAN OF CARE
Stable condition. VS WNL. Lungs clear. Pain is being managed with oral medications. Self tasha-care and reports light vaginal bleeding. Perineal Abrasion, using tucks and dermaplast. Fundus firm without massage. Voiding without difficulty. Tolerating regular diet and drinking adequate amounts of fluids. Bonding appropriately with .     LACTATION NOTE: Stop BF on previous shift. Only formula feeding.    Formula Feeding Guide given and explained. Handouts included in the guide are as follows: Safe Bottle Feeding, WIC- Let your Baby Set the Pace for Bottle Feeding,  Formula Feeding Record, WISE- Formula Feeding, Managing Non-nursing Engorgement, Community Resources, & Baby Feeding Cues (signs). Instructed to feed on demand/cue, 8 or more times in 24 hours, utilizing paced bottle feeding technique. Feed baby until fullness cues observed. Questions/Concerns answered. Mother verbalized and demonstrated understanding.     Discharge instructions given including Urgent Maternal Warning Signs. F/u with Dr. Chapin on  @ 1:30 pm for 2 week pp. V/u. Received a copy of discharge instructions.

## 2025-08-06 NOTE — DISCHARGE SUMMARY
[facility] - Labor & Delivery  Obstetrics  Discharge Summary      Patient Name: Boni Duarte  MRN: [id number]  Admission Date: 2025  Hospital Length of Stay: 2 days  Discharge Date and Time: 2025 9:18 AM  Attending Physician: Maximilian Desir MD   Discharging Provider: Delvin Chapin MD   Primary Care Provider: Cal Clay MD    HPI: 34 y.o.  female  at 39w1d weeks gestation with an Estimated Date of Delivery: 8/10/25 who is admitted complaining of Induction of labor for: Elective at term.  Patient also with history of sickle cell disease.  She denies Decreased Fetal Movement, Leakage of Fluid, and Vaginal Bleeding. Fetal Movement: normal.       * No surgery found *     Hospital Course:   HD#1 Admit for elective IOL. Patient with sickle cell disease. FAVD.   HD#2 Routine PP care; patient with asymptomatic anemia. Will monitor.   HD#3 Routine pp care. Stable anemia; remains asymptomatic.  Discharge home.          Final Active Diagnoses:    Diagnosis Date Noted POA    PRINCIPAL PROBLEM:  Encounter for elective induction of labor [Z34.90] 2025 Not Applicable    Forceps delivery with baby delivered [O75.9] 2025 No    - Maternal sickle cell anemia affecting pregnancy, antepartum [O99.019, D57.1] 2015 Yes      Problems Resolved During this Admission:        Significant Diagnostic Studies: N/A      Feeding Method: both breast and bottle    Immunizations       Date Immunization Status Dose Route/Site Given by    25 MMR Incomplete 0.5 mL Subcutaneous/     25 Tdap Incomplete 0.5 mL Intramuscular/             Delivery:    Episiotomy: None   Lacerations:     Repair suture:     Repair # of packets:     Blood loss (ml):       Birth information:  YOB: [birth year]25   Time of birth: 9:10 PM   Sex: female   Delivery type: Vaginal, Forceps   Gestational Age: 39w1d     Measurements    Weight: 3260 g  Weight (lbs): 7 lb 3 oz  Length:          Delivery  Clinician: Delivery Providers    Delivering clinician: Maximilian Desir MD   Provider Role    Novelty, JF Martínez Kayla, RN Verdin, Ashley, [place], Judy P, LPN              Additional  information:  Forceps:    Vacuum:    Breech:    Observed anomalies      Living?:     Apgars    Living status: Living  Apgar Component Scores:  1 min.:  5 min.:  10 min.:  15 min.:  20 min.:    Skin color:  0  1       Heart rate:  2  2       Reflex irritability:  2  2       Muscle tone:  2  2       Respiratory effort:  2  2       Total:  8  9       Apgars assigned by: SOFI BLANCHARD LPN         Placenta: Delivered:       appearance  Pending Diagnostic Studies:       Procedure Component Value Units Date/Time    Vaginosis Screen by DNA Probe [[id number]] Collected: 25    Order Status: Sent Lab Status: In process Updated: 25    Specimen: Genital from Vaginal             Discharged Condition: good    Disposition: Home or Self Care    Follow Up:   Follow-up Information       Maximilian Desir MD Follow up in 2 week(s).    Specialties: Obstetrics, Obstetrics and Gynecology  Contact information:  [facility], [address] [place] [address]  [phone]                           Patient Instructions:      Diet Adult Regular     Lifting restrictions   Order Comments: No lifting greater than 15 pounds     Pelvic Rest     Notify your health care provider if you experience any of the following:  temperature >100.4     Notify your health care provider if you experience any of the following:  persistent nausea and vomiting or diarrhea     Notify your health care provider if you experience any of the following:  severe uncontrolled pain     Notify your health care provider if you experience any of the following:  redness, tenderness, or signs of infection (pain, swelling, redness, odor or green/yellow discharge around incision site)     Notify your health care provider if you  experience any of the following:  difficulty breathing or increased cough     Notify your health care provider if you experience any of the following:  severe persistent headache     Notify your health care provider if you experience any of the following:  worsening rash     Notify your health care provider if you experience any of the following:  persistent dizziness, light-headedness, or visual disturbances     Notify your health care provider if you experience any of the following:  increased confusion or weakness     Notify your health care provider if you experience any of the following:   Order Comments: Heavy vaginal bleeding (saturating 2 pads in 1 hour)     Activity as tolerated     Medications:  Current Discharge Medication List        START taking these medications    Details   ibuprofen (ADVIL,MOTRIN) 800 MG tablet Take 1 tablet (800 mg total) by mouth every 8 (eight) hours.  Qty: 30 tablet, Refills: 0           CONTINUE these medications which have NOT CHANGED    Details   folic acid (FOLVITE) 1 MG tablet Take 4 tablets (4 mg total) by mouth once daily.  Qty: 100 tablet, Refills: 3    Associated Diagnoses: Sickle cell disease without crisis      PNV,calcium 72/iron/folic acid (PRENATAL VITAMIN) Tab Take 1 tablet by mouth once daily.             Delvin Chapin MD  Obstetrics  [facility] - Labor & Delivery

## 2025-08-07 LAB
BACTERIAL VAGINOSIS DNA (OHS): DETECTED
CANDIDA GLABRATA/KRUSEI DNA (OHS): NOT DETECTED
CANDIDA SPECIES DNA (OHS): NOT DETECTED
TRICHOMONAS VAGINALIS DNA (OHS): NOT DETECTED

## 2025-08-08 LAB
ABO + RH BLD: NORMAL
ABO + RH BLD: NORMAL
BLD PROD TYP BPU: NORMAL
BLD PROD TYP BPU: NORMAL
BLOOD UNIT EXPIRATION DATE: NORMAL
BLOOD UNIT EXPIRATION DATE: NORMAL
BLOOD UNIT TYPE CODE: 1700
BLOOD UNIT TYPE CODE: 1700
CROSSMATCH INTERPRETATION: NORMAL
CROSSMATCH INTERPRETATION: NORMAL
DISPENSE STATUS: NORMAL
DISPENSE STATUS: NORMAL
UNIT NUMBER: NORMAL
UNIT NUMBER: NORMAL

## 2025-08-11 PROBLEM — Z34.90 ENCOUNTER FOR ELECTIVE INDUCTION OF LABOR: Status: RESOLVED | Noted: 2025-08-04 | Resolved: 2025-08-11

## 2025-09-03 ENCOUNTER — POSTPARTUM VISIT (OUTPATIENT)
Dept: OBSTETRICS AND GYNECOLOGY | Facility: CLINIC | Age: 35
End: 2025-09-03
Payer: MEDICAID

## 2025-09-03 VITALS
DIASTOLIC BLOOD PRESSURE: 70 MMHG | SYSTOLIC BLOOD PRESSURE: 110 MMHG | HEIGHT: 67 IN | WEIGHT: 171.63 LBS | HEART RATE: 88 BPM | BODY MASS INDEX: 26.94 KG/M2 | OXYGEN SATURATION: 98 %

## 2025-09-03 DIAGNOSIS — Z13.32 ENCOUNTER FOR SCREENING FOR MATERNAL DEPRESSION: ICD-10-CM

## 2025-09-03 DIAGNOSIS — Z3A.39 39 WEEKS GESTATION OF PREGNANCY: ICD-10-CM

## 2025-09-03 PROBLEM — N93.9 VAGINAL BLEEDING: Status: RESOLVED | Noted: 2020-02-13 | Resolved: 2025-09-03

## 2025-09-03 PROBLEM — O46.93 VAGINAL BLEEDING IN PREGNANCY, THIRD TRIMESTER: Status: RESOLVED | Noted: 2025-06-21 | Resolved: 2025-09-03

## 2025-09-03 PROCEDURE — 99999 PR PBB SHADOW E&M-EST. PATIENT-LVL II: CPT | Mod: PBBFAC,,, | Performed by: OBSTETRICS & GYNECOLOGY

## 2025-09-03 PROCEDURE — 99212 OFFICE O/P EST SF 10 MIN: CPT | Mod: PBBFAC | Performed by: OBSTETRICS & GYNECOLOGY
